# Patient Record
Sex: MALE | Race: WHITE | Employment: FULL TIME | URBAN - METROPOLITAN AREA
[De-identification: names, ages, dates, MRNs, and addresses within clinical notes are randomized per-mention and may not be internally consistent; named-entity substitution may affect disease eponyms.]

---

## 2017-01-10 PROBLEM — G43.719 INTRACTABLE CHRONIC MIGRAINE WITHOUT AURA AND WITHOUT STATUS MIGRAINOSUS: Status: ACTIVE | Noted: 2017-01-10

## 2017-01-10 PROBLEM — G44.40 REBOUND HEADACHE: Status: ACTIVE | Noted: 2017-01-10

## 2017-01-10 PROBLEM — G44.211 INTRACTABLE EPISODIC TENSION-TYPE HEADACHE: Status: ACTIVE | Noted: 2017-01-10

## 2017-01-20 RX ORDER — TRAMADOL HYDROCHLORIDE 50 MG/1
TABLET ORAL
Qty: 120 TABLET | Refills: 1 | Status: SHIPPED | OUTPATIENT
Start: 2017-01-20 | End: 2017-03-29 | Stop reason: SDUPTHER

## 2017-02-28 ENCOUNTER — OFFICE VISIT (OUTPATIENT)
Dept: INTERNAL MEDICINE CLINIC | Age: 57
End: 2017-02-28

## 2017-02-28 VITALS
SYSTOLIC BLOOD PRESSURE: 124 MMHG | DIASTOLIC BLOOD PRESSURE: 80 MMHG | BODY MASS INDEX: 28.6 KG/M2 | WEIGHT: 199.8 LBS | HEIGHT: 70 IN | HEART RATE: 80 BPM | RESPIRATION RATE: 16 BRPM

## 2017-02-28 DIAGNOSIS — I10 ESSENTIAL HYPERTENSION: Primary | ICD-10-CM

## 2017-02-28 DIAGNOSIS — G43.719 INTRACTABLE CHRONIC MIGRAINE WITHOUT AURA AND WITHOUT STATUS MIGRAINOSUS: ICD-10-CM

## 2017-02-28 PROBLEM — G44.40 REBOUND HEADACHE: Status: RESOLVED | Noted: 2017-01-10 | Resolved: 2017-02-28

## 2017-02-28 PROCEDURE — 99213 OFFICE O/P EST LOW 20 MIN: CPT | Performed by: INTERNAL MEDICINE

## 2017-03-29 RX ORDER — TRAMADOL HYDROCHLORIDE 50 MG/1
TABLET ORAL
Qty: 120 TABLET | Refills: 0 | Status: SHIPPED | OUTPATIENT
Start: 2017-03-29 | End: 2017-04-28 | Stop reason: SDUPTHER

## 2017-04-28 RX ORDER — TRAMADOL HYDROCHLORIDE 50 MG/1
TABLET ORAL
Qty: 120 TABLET | Refills: 0 | Status: SHIPPED | OUTPATIENT
Start: 2017-04-28 | End: 2017-06-01 | Stop reason: SDUPTHER

## 2017-05-16 ENCOUNTER — OFFICE VISIT (OUTPATIENT)
Dept: INTERNAL MEDICINE CLINIC | Age: 57
End: 2017-05-16

## 2017-05-16 VITALS
BODY MASS INDEX: 28.63 KG/M2 | DIASTOLIC BLOOD PRESSURE: 88 MMHG | HEART RATE: 88 BPM | RESPIRATION RATE: 16 BRPM | WEIGHT: 200 LBS | HEIGHT: 70 IN | SYSTOLIC BLOOD PRESSURE: 138 MMHG

## 2017-05-16 DIAGNOSIS — G43.109 MIGRAINE AURA WITHOUT HEADACHE: ICD-10-CM

## 2017-05-16 DIAGNOSIS — I10 ESSENTIAL HYPERTENSION: Primary | ICD-10-CM

## 2017-05-16 PROCEDURE — 99213 OFFICE O/P EST LOW 20 MIN: CPT | Performed by: INTERNAL MEDICINE

## 2017-05-30 ENCOUNTER — OFFICE VISIT (OUTPATIENT)
Dept: INTERNAL MEDICINE CLINIC | Age: 57
End: 2017-05-30

## 2017-05-30 VITALS
WEIGHT: 194.8 LBS | OXYGEN SATURATION: 98 % | TEMPERATURE: 98.3 F | DIASTOLIC BLOOD PRESSURE: 96 MMHG | SYSTOLIC BLOOD PRESSURE: 116 MMHG | BODY MASS INDEX: 27.89 KG/M2 | HEART RATE: 85 BPM | HEIGHT: 70 IN

## 2017-05-30 DIAGNOSIS — J40 BRONCHITIS: ICD-10-CM

## 2017-05-30 DIAGNOSIS — R03.0 ELEVATED BLOOD PRESSURE (NOT HYPERTENSION): ICD-10-CM

## 2017-05-30 PROCEDURE — 99213 OFFICE O/P EST LOW 20 MIN: CPT | Performed by: INTERNAL MEDICINE

## 2017-05-30 RX ORDER — GUAIFENESIN AND CODEINE PHOSPHATE 100; 10 MG/5ML; MG/5ML
5-10 SOLUTION ORAL EVERY 4 HOURS PRN
Qty: 180 ML | Refills: 1 | Status: SHIPPED | OUTPATIENT
Start: 2017-05-30 | End: 2017-07-11

## 2017-05-30 RX ORDER — AZITHROMYCIN 500 MG/1
500 TABLET, FILM COATED ORAL DAILY
Qty: 1 PACKET | Refills: 0 | Status: SHIPPED | OUTPATIENT
Start: 2017-05-30 | End: 2017-06-02

## 2017-06-01 RX ORDER — TRAMADOL HYDROCHLORIDE 50 MG/1
TABLET ORAL
Qty: 120 TABLET | Refills: 0 | Status: SHIPPED | OUTPATIENT
Start: 2017-06-01 | End: 2017-07-06 | Stop reason: SDUPTHER

## 2017-06-26 RX ORDER — SUMATRIPTAN 100 MG/1
TABLET, FILM COATED ORAL
Qty: 9 TABLET | Refills: 3 | Status: SHIPPED | OUTPATIENT
Start: 2017-06-26 | End: 2017-09-05 | Stop reason: SDUPTHER

## 2017-07-06 RX ORDER — TRAMADOL HYDROCHLORIDE 50 MG/1
TABLET ORAL
Qty: 120 TABLET | Refills: 0 | Status: SHIPPED | OUTPATIENT
Start: 2017-07-06 | End: 2017-07-25 | Stop reason: SDUPTHER

## 2017-07-21 ENCOUNTER — OFFICE VISIT (OUTPATIENT)
Dept: INTERNAL MEDICINE CLINIC | Age: 57
End: 2017-07-21

## 2017-07-21 VITALS
OXYGEN SATURATION: 98 % | BODY MASS INDEX: 28.32 KG/M2 | WEIGHT: 197.8 LBS | HEIGHT: 70 IN | DIASTOLIC BLOOD PRESSURE: 84 MMHG | SYSTOLIC BLOOD PRESSURE: 132 MMHG | HEART RATE: 84 BPM

## 2017-07-21 DIAGNOSIS — I10 ESSENTIAL HYPERTENSION: Primary | ICD-10-CM

## 2017-07-21 PROCEDURE — 99213 OFFICE O/P EST LOW 20 MIN: CPT | Performed by: INTERNAL MEDICINE

## 2017-07-25 RX ORDER — TRAMADOL HYDROCHLORIDE 50 MG/1
TABLET ORAL
Qty: 120 TABLET | Refills: 0 | Status: SHIPPED | OUTPATIENT
Start: 2017-07-25 | End: 2017-09-05 | Stop reason: SDUPTHER

## 2017-07-25 RX ORDER — LISINOPRIL 10 MG/1
TABLET ORAL
Qty: 30 TABLET | Refills: 11 | Status: SHIPPED | OUTPATIENT
Start: 2017-07-25 | End: 2018-05-03 | Stop reason: SDUPTHER

## 2017-07-25 RX ORDER — NADOLOL 80 MG/1
TABLET ORAL
Qty: 30 TABLET | Refills: 11 | Status: SHIPPED | OUTPATIENT
Start: 2017-07-25 | End: 2018-05-03 | Stop reason: SDUPTHER

## 2017-07-25 RX ORDER — TRIAMTERENE AND HYDROCHLOROTHIAZIDE 37.5; 25 MG/1; MG/1
TABLET ORAL
Qty: 30 TABLET | Refills: 11 | Status: SHIPPED | OUTPATIENT
Start: 2017-07-25 | End: 2018-05-03 | Stop reason: SDUPTHER

## 2017-09-05 RX ORDER — TRAMADOL HYDROCHLORIDE 50 MG/1
TABLET ORAL
Qty: 120 TABLET | Refills: 0 | Status: SHIPPED | OUTPATIENT
Start: 2017-09-05 | End: 2017-10-06 | Stop reason: SDUPTHER

## 2017-09-05 RX ORDER — SUMATRIPTAN 100 MG/1
TABLET, FILM COATED ORAL
Qty: 9 TABLET | Refills: 3 | Status: SHIPPED | OUTPATIENT
Start: 2017-09-05 | End: 2017-12-15 | Stop reason: SDUPTHER

## 2017-10-06 RX ORDER — TRAMADOL HYDROCHLORIDE 50 MG/1
TABLET ORAL
Qty: 120 TABLET | Refills: 1 | Status: SHIPPED | OUTPATIENT
Start: 2017-10-06 | End: 2017-11-06 | Stop reason: SDUPTHER

## 2017-11-06 RX ORDER — TRAMADOL HYDROCHLORIDE 50 MG/1
TABLET ORAL
Qty: 120 TABLET | Refills: 1 | Status: SHIPPED | OUTPATIENT
Start: 2017-11-06 | End: 2018-01-04 | Stop reason: SDUPTHER

## 2017-11-21 ENCOUNTER — OFFICE VISIT (OUTPATIENT)
Dept: INTERNAL MEDICINE CLINIC | Age: 57
End: 2017-11-21

## 2017-11-21 VITALS
HEART RATE: 74 BPM | BODY MASS INDEX: 28.92 KG/M2 | SYSTOLIC BLOOD PRESSURE: 130 MMHG | WEIGHT: 202 LBS | RESPIRATION RATE: 16 BRPM | HEIGHT: 70 IN | DIASTOLIC BLOOD PRESSURE: 82 MMHG

## 2017-11-21 DIAGNOSIS — Z23 NEED FOR INFLUENZA VACCINATION: ICD-10-CM

## 2017-11-21 DIAGNOSIS — E78.00 PURE HYPERCHOLESTEROLEMIA: ICD-10-CM

## 2017-11-21 DIAGNOSIS — G43.109 MIGRAINE AURA WITHOUT HEADACHE: ICD-10-CM

## 2017-11-21 DIAGNOSIS — I10 ESSENTIAL HYPERTENSION: Primary | ICD-10-CM

## 2017-11-21 DIAGNOSIS — Z12.5 SCREENING PSA (PROSTATE SPECIFIC ANTIGEN): ICD-10-CM

## 2017-11-21 PROCEDURE — 90471 IMMUNIZATION ADMIN: CPT | Performed by: INTERNAL MEDICINE

## 2017-11-21 PROCEDURE — 99213 OFFICE O/P EST LOW 20 MIN: CPT | Performed by: INTERNAL MEDICINE

## 2017-11-21 PROCEDURE — 90686 IIV4 VACC NO PRSV 0.5 ML IM: CPT | Performed by: INTERNAL MEDICINE

## 2017-11-21 ASSESSMENT — PATIENT HEALTH QUESTIONNAIRE - PHQ9
SUM OF ALL RESPONSES TO PHQ QUESTIONS 1-9: 0
SUM OF ALL RESPONSES TO PHQ9 QUESTIONS 1 & 2: 0
1. LITTLE INTEREST OR PLEASURE IN DOING THINGS: 0
2. FEELING DOWN, DEPRESSED OR HOPELESS: 0

## 2017-12-07 ENCOUNTER — TELEPHONE (OUTPATIENT)
Dept: INTERNAL MEDICINE CLINIC | Age: 57
End: 2017-12-07

## 2017-12-08 DIAGNOSIS — G43.109 MIGRAINE AURA WITHOUT HEADACHE: ICD-10-CM

## 2017-12-08 DIAGNOSIS — Z12.5 SCREENING PSA (PROSTATE SPECIFIC ANTIGEN): ICD-10-CM

## 2017-12-08 DIAGNOSIS — E78.00 PURE HYPERCHOLESTEROLEMIA: Primary | ICD-10-CM

## 2017-12-08 LAB
A/G RATIO: 1.6 (ref 1.1–2.2)
ALBUMIN SERPL-MCNC: 4.5 G/DL (ref 3.4–5)
ALP BLD-CCNC: 83 U/L (ref 40–129)
ALT SERPL-CCNC: 42 U/L (ref 10–40)
ANION GAP SERPL CALCULATED.3IONS-SCNC: 11 MMOL/L (ref 3–16)
AST SERPL-CCNC: 32 U/L (ref 15–37)
BILIRUB SERPL-MCNC: 0.5 MG/DL (ref 0–1)
BUN BLDV-MCNC: 15 MG/DL (ref 7–20)
CALCIUM SERPL-MCNC: 10.1 MG/DL (ref 8.3–10.6)
CHLORIDE BLD-SCNC: 99 MMOL/L (ref 99–110)
CHOLESTEROL, TOTAL: 209 MG/DL (ref 0–199)
CO2: 30 MMOL/L (ref 21–32)
CREAT SERPL-MCNC: 0.9 MG/DL (ref 0.9–1.3)
GFR AFRICAN AMERICAN: >60
GFR NON-AFRICAN AMERICAN: >60
GLOBULIN: 2.8 G/DL
GLUCOSE BLD-MCNC: 118 MG/DL (ref 70–99)
HDLC SERPL-MCNC: 47 MG/DL (ref 40–60)
LDL CHOLESTEROL CALCULATED: 130 MG/DL
POTASSIUM SERPL-SCNC: 5 MMOL/L (ref 3.5–5.1)
PROSTATE SPECIFIC ANTIGEN: 0.46 NG/ML (ref 0–4)
SODIUM BLD-SCNC: 140 MMOL/L (ref 136–145)
TOTAL PROTEIN: 7.3 G/DL (ref 6.4–8.2)
TRIGL SERPL-MCNC: 159 MG/DL (ref 0–150)
VLDLC SERPL CALC-MCNC: 32 MG/DL

## 2017-12-15 RX ORDER — SUMATRIPTAN 100 MG/1
TABLET, FILM COATED ORAL
Qty: 9 TABLET | Refills: 3 | Status: SHIPPED | OUTPATIENT
Start: 2017-12-15 | End: 2018-03-09 | Stop reason: SDUPTHER

## 2018-01-04 DIAGNOSIS — M54.2 CERVICALGIA: Primary | ICD-10-CM

## 2018-01-04 RX ORDER — TRAMADOL HYDROCHLORIDE 50 MG/1
TABLET ORAL
Qty: 120 TABLET | Refills: 1 | Status: SHIPPED | OUTPATIENT
Start: 2018-01-04 | End: 2018-03-09 | Stop reason: SDUPTHER

## 2018-03-09 ENCOUNTER — TELEPHONE (OUTPATIENT)
Dept: INTERNAL MEDICINE CLINIC | Age: 58
End: 2018-03-09

## 2018-03-09 DIAGNOSIS — M54.2 CERVICALGIA: ICD-10-CM

## 2018-03-09 RX ORDER — TRAMADOL HYDROCHLORIDE 50 MG/1
TABLET ORAL
Qty: 120 TABLET | Refills: 0 | Status: SHIPPED | OUTPATIENT
Start: 2018-03-09 | End: 2018-05-10 | Stop reason: SDUPTHER

## 2018-03-09 RX ORDER — SUMATRIPTAN 100 MG/1
TABLET, FILM COATED ORAL
Qty: 9 TABLET | Refills: 3 | Status: SHIPPED | OUTPATIENT
Start: 2018-03-09 | End: 2018-08-10

## 2018-03-22 ENCOUNTER — OFFICE VISIT (OUTPATIENT)
Dept: INTERNAL MEDICINE CLINIC | Age: 58
End: 2018-03-22

## 2018-03-22 VITALS
WEIGHT: 195 LBS | HEIGHT: 70 IN | SYSTOLIC BLOOD PRESSURE: 122 MMHG | RESPIRATION RATE: 16 BRPM | DIASTOLIC BLOOD PRESSURE: 96 MMHG | BODY MASS INDEX: 27.92 KG/M2 | HEART RATE: 82 BPM

## 2018-03-22 DIAGNOSIS — I10 ESSENTIAL HYPERTENSION: Primary | ICD-10-CM

## 2018-03-22 DIAGNOSIS — G43.719 INTRACTABLE CHRONIC MIGRAINE WITHOUT AURA AND WITHOUT STATUS MIGRAINOSUS: ICD-10-CM

## 2018-03-22 DIAGNOSIS — E78.00 PURE HYPERCHOLESTEROLEMIA: ICD-10-CM

## 2018-03-22 DIAGNOSIS — R73.9 HYPERGLYCEMIA: ICD-10-CM

## 2018-03-22 PROCEDURE — 99213 OFFICE O/P EST LOW 20 MIN: CPT | Performed by: INTERNAL MEDICINE

## 2018-04-26 DIAGNOSIS — R73.9 HYPERGLYCEMIA: ICD-10-CM

## 2018-04-26 DIAGNOSIS — E78.00 PURE HYPERCHOLESTEROLEMIA: ICD-10-CM

## 2018-04-26 LAB
CHOLESTEROL, TOTAL: 153 MG/DL (ref 0–199)
GLUCOSE BLD-MCNC: 102 MG/DL (ref 70–99)
HDLC SERPL-MCNC: 40 MG/DL (ref 40–60)
LDL CHOLESTEROL CALCULATED: 83 MG/DL
TRIGL SERPL-MCNC: 150 MG/DL (ref 0–150)
VLDLC SERPL CALC-MCNC: 30 MG/DL

## 2018-05-03 ENCOUNTER — OFFICE VISIT (OUTPATIENT)
Dept: INTERNAL MEDICINE CLINIC | Age: 58
End: 2018-05-03

## 2018-05-03 VITALS
BODY MASS INDEX: 27.69 KG/M2 | WEIGHT: 193.4 LBS | SYSTOLIC BLOOD PRESSURE: 118 MMHG | RESPIRATION RATE: 16 BRPM | HEIGHT: 70 IN | DIASTOLIC BLOOD PRESSURE: 80 MMHG | HEART RATE: 78 BPM

## 2018-05-03 DIAGNOSIS — I10 ESSENTIAL HYPERTENSION: Primary | ICD-10-CM

## 2018-05-03 PROCEDURE — 99213 OFFICE O/P EST LOW 20 MIN: CPT | Performed by: INTERNAL MEDICINE

## 2018-05-03 RX ORDER — TRIAMTERENE AND HYDROCHLOROTHIAZIDE 37.5; 25 MG/1; MG/1
TABLET ORAL
Qty: 90 TABLET | Refills: 3 | Status: SHIPPED | OUTPATIENT
Start: 2018-05-03 | End: 2019-01-31 | Stop reason: SDUPTHER

## 2018-05-03 RX ORDER — LISINOPRIL 10 MG/1
TABLET ORAL
Qty: 90 TABLET | Refills: 3 | Status: SHIPPED | OUTPATIENT
Start: 2018-05-03 | End: 2019-01-31 | Stop reason: SDUPTHER

## 2018-05-03 RX ORDER — NADOLOL 80 MG/1
TABLET ORAL
Qty: 90 TABLET | Refills: 3 | Status: SHIPPED | OUTPATIENT
Start: 2018-05-03 | End: 2019-01-31 | Stop reason: SDUPTHER

## 2018-05-10 DIAGNOSIS — M54.2 CERVICALGIA: ICD-10-CM

## 2018-05-10 RX ORDER — TRAMADOL HYDROCHLORIDE 50 MG/1
TABLET ORAL
Qty: 120 TABLET | Refills: 0 | Status: SHIPPED | OUTPATIENT
Start: 2018-05-10 | End: 2018-06-11 | Stop reason: SDUPTHER

## 2018-06-11 DIAGNOSIS — M54.2 CERVICALGIA: ICD-10-CM

## 2018-06-11 RX ORDER — TRAMADOL HYDROCHLORIDE 50 MG/1
TABLET ORAL
Qty: 120 TABLET | Refills: 0 | Status: SHIPPED | OUTPATIENT
Start: 2018-06-11 | End: 2018-07-11 | Stop reason: SDUPTHER

## 2018-07-11 DIAGNOSIS — M54.2 CERVICALGIA: ICD-10-CM

## 2018-07-11 RX ORDER — TRAMADOL HYDROCHLORIDE 50 MG/1
TABLET ORAL
Qty: 120 TABLET | Refills: 0 | Status: SHIPPED | OUTPATIENT
Start: 2018-07-11 | End: 2018-08-09 | Stop reason: SDUPTHER

## 2018-08-03 ENCOUNTER — TELEPHONE (OUTPATIENT)
Dept: INTERNAL MEDICINE CLINIC | Age: 58
End: 2018-08-03

## 2018-08-03 NOTE — TELEPHONE ENCOUNTER
Patient called to cancel appointment with Dr. Ricky Davenport today   He is headed to emergency room with terrible pain on right side

## 2018-08-09 DIAGNOSIS — M54.2 CERVICALGIA: ICD-10-CM

## 2018-08-09 RX ORDER — TRAMADOL HYDROCHLORIDE 50 MG/1
TABLET ORAL
Qty: 120 TABLET | Refills: 0 | Status: SHIPPED | OUTPATIENT
Start: 2018-08-09 | End: 2018-08-10

## 2018-08-10 ENCOUNTER — OFFICE VISIT (OUTPATIENT)
Dept: INTERNAL MEDICINE CLINIC | Age: 58
End: 2018-08-10

## 2018-08-10 VITALS
BODY MASS INDEX: 28.09 KG/M2 | WEIGHT: 196.2 LBS | RESPIRATION RATE: 16 BRPM | DIASTOLIC BLOOD PRESSURE: 88 MMHG | SYSTOLIC BLOOD PRESSURE: 120 MMHG | HEART RATE: 86 BPM | HEIGHT: 70 IN

## 2018-08-10 DIAGNOSIS — M54.2 CERVICALGIA: ICD-10-CM

## 2018-08-10 DIAGNOSIS — I10 ESSENTIAL HYPERTENSION: Primary | ICD-10-CM

## 2018-08-10 LAB
ANION GAP SERPL CALCULATED.3IONS-SCNC: 15 MMOL/L (ref 3–16)
BUN BLDV-MCNC: 22 MG/DL (ref 7–20)
CALCIUM SERPL-MCNC: 9.4 MG/DL (ref 8.3–10.6)
CHLORIDE BLD-SCNC: 99 MMOL/L (ref 99–110)
CO2: 26 MMOL/L (ref 21–32)
CREAT SERPL-MCNC: 1.1 MG/DL (ref 0.9–1.3)
GFR AFRICAN AMERICAN: >60
GFR NON-AFRICAN AMERICAN: >60
GLUCOSE BLD-MCNC: 105 MG/DL (ref 70–99)
POTASSIUM SERPL-SCNC: 4.1 MMOL/L (ref 3.5–5.1)
SODIUM BLD-SCNC: 140 MMOL/L (ref 136–145)

## 2018-08-10 PROCEDURE — 99213 OFFICE O/P EST LOW 20 MIN: CPT | Performed by: INTERNAL MEDICINE

## 2018-08-10 NOTE — PROGRESS NOTES
Subjective:      Patient ID: Makenna Duong is a 66 y.o. male. HPI  Here for f/u of his htn and back pain. He has been doing well overall. Had a kidney stone over the weekend. This has passed. BP at home 120's/70's. Denies chest pain or shortness of breath. Denies PND or orthopnea. No lower extremity edema noted. No formal exercise but does walks regularly at work. The chronic headaches and pain are stable, unchanged. Current Outpatient Prescriptions on File Prior to Visit   Medication Sig Dispense Refill    traMADol (ULTRAM) 50 MG tablet TAKE 1 TABLET EVERY 6 HOURS AS NEEDED FOR PAIN 120 tablet 0    tamsulosin (FLOMAX) 0.4 MG capsule Take 1 capsule by mouth daily for 5 doses 5 capsule 0    naproxen (NAPROSYN) 500 MG tablet Take 1 tablet by mouth 2 times daily (with meals) 20 tablet 0    ondansetron (ZOFRAN ODT) 4 MG disintegrating tablet Take 1-2 tablets by mouth every 8 hours as needed for Nausea May Sub regular tablet (non-ODT) if insurance does not cover ODT. 20 tablet 0    lisinopril (PRINIVIL;ZESTRIL) 10 MG tablet Take 1 tablet by mouth daily. 90 tablet 3    triamterene-hydrochlorothiazide (MAXZIDE-25) 37.5-25 MG per tablet Take 1 tablet by mouth daily. 90 tablet 3    nadolol (CORGARD) 80 MG tablet TAKE ONE TABLET BY MOUTH EVERY DAY 90 tablet 3    SUMAtriptan (IMITREX) 100 MG tablet TAKE 1 TAB PO PRN FOR MIGRAINE, MAY REPEAT ONCE AFTER 2 HRS. NOT TO EXCEED 2 DOSES IN 24 HRS. 9 tablet 3    nortriptyline (PAMELOR) 10 MG capsule Take 3 capsules by mouth nightly 90 capsule 11     No current facility-administered medications on file prior to visit. Review of Systems   All other systems reviewed and are negative. Objective:   Physical Exam   Constitutional: He appears well-developed and well-nourished. Cardiovascular: Normal rate, regular rhythm and normal heart sounds. Pulmonary/Chest: Effort normal and breath sounds normal. He has no wheezes. He has no rales.

## 2018-09-10 DIAGNOSIS — M54.2 CERVICALGIA: ICD-10-CM

## 2018-09-10 RX ORDER — TRAMADOL HYDROCHLORIDE 50 MG/1
TABLET ORAL
Qty: 120 TABLET | Refills: 0 | Status: SHIPPED | OUTPATIENT
Start: 2018-09-10 | End: 2018-10-09 | Stop reason: SDUPTHER

## 2018-10-09 DIAGNOSIS — M54.2 CERVICALGIA: ICD-10-CM

## 2018-10-09 RX ORDER — TRAMADOL HYDROCHLORIDE 50 MG/1
TABLET ORAL
Qty: 120 TABLET | Refills: 0 | Status: SHIPPED | OUTPATIENT
Start: 2018-10-09 | End: 2018-11-07 | Stop reason: SDUPTHER

## 2018-11-07 DIAGNOSIS — M54.2 CERVICALGIA: ICD-10-CM

## 2018-11-07 RX ORDER — TRAMADOL HYDROCHLORIDE 50 MG/1
TABLET ORAL
Qty: 120 TABLET | Refills: 0 | Status: SHIPPED | OUTPATIENT
Start: 2018-11-07 | End: 2018-12-06

## 2018-12-05 DIAGNOSIS — M54.2 CERVICALGIA: ICD-10-CM

## 2018-12-06 DIAGNOSIS — M54.2 CERVICALGIA: ICD-10-CM

## 2018-12-06 RX ORDER — TRAMADOL HYDROCHLORIDE 50 MG/1
TABLET ORAL
Qty: 120 TABLET | Refills: 0 | Status: SHIPPED | OUTPATIENT
Start: 2018-12-06 | End: 2019-01-05

## 2018-12-06 RX ORDER — TRAMADOL HYDROCHLORIDE 50 MG/1
TABLET ORAL
Qty: 120 TABLET | Refills: 0 | Status: SHIPPED | OUTPATIENT
Start: 2018-12-06 | End: 2019-01-31 | Stop reason: SDUPTHER

## 2019-01-31 DIAGNOSIS — M54.2 CERVICALGIA: ICD-10-CM

## 2019-02-01 RX ORDER — TRAMADOL HYDROCHLORIDE 50 MG/1
TABLET ORAL
Qty: 120 TABLET | Refills: 0 | Status: SHIPPED | OUTPATIENT
Start: 2019-02-01 | End: 2019-02-28 | Stop reason: SDUPTHER

## 2019-02-01 RX ORDER — LISINOPRIL 10 MG/1
TABLET ORAL
Qty: 90 TABLET | Refills: 3 | Status: SHIPPED | OUTPATIENT
Start: 2019-02-01 | End: 2020-01-20

## 2019-02-01 RX ORDER — TRIAMTERENE AND HYDROCHLOROTHIAZIDE 37.5; 25 MG/1; MG/1
TABLET ORAL
Qty: 90 TABLET | Refills: 3 | Status: SHIPPED | OUTPATIENT
Start: 2019-02-01 | End: 2020-01-20

## 2019-02-01 RX ORDER — NADOLOL 80 MG/1
TABLET ORAL
Qty: 90 TABLET | Refills: 3 | Status: SHIPPED | OUTPATIENT
Start: 2019-02-01 | End: 2020-01-20

## 2019-02-28 DIAGNOSIS — M54.2 CERVICALGIA: ICD-10-CM

## 2019-02-28 RX ORDER — TRAMADOL HYDROCHLORIDE 50 MG/1
TABLET ORAL
Qty: 120 TABLET | Refills: 0 | Status: SHIPPED | OUTPATIENT
Start: 2019-02-28 | End: 2019-03-28 | Stop reason: SDUPTHER

## 2019-03-28 DIAGNOSIS — M54.2 CERVICALGIA: ICD-10-CM

## 2019-03-28 RX ORDER — TRAMADOL HYDROCHLORIDE 50 MG/1
TABLET ORAL
Qty: 120 TABLET | Refills: 0 | Status: SHIPPED | OUTPATIENT
Start: 2019-03-28 | End: 2019-04-24 | Stop reason: SDUPTHER

## 2019-04-24 DIAGNOSIS — M54.2 CERVICALGIA: ICD-10-CM

## 2019-04-24 RX ORDER — TRAMADOL HYDROCHLORIDE 50 MG/1
TABLET ORAL
Qty: 120 TABLET | Refills: 0 | Status: SHIPPED | OUTPATIENT
Start: 2019-04-24 | End: 2019-05-22 | Stop reason: SDUPTHER

## 2019-05-21 ENCOUNTER — HOSPITAL ENCOUNTER (EMERGENCY)
Age: 59
Discharge: HOME | End: 2019-05-21
Payer: COMMERCIAL

## 2019-05-21 VITALS
TEMPERATURE: 98.6 F | WEIGHT: 200.18 LBS | SYSTOLIC BLOOD PRESSURE: 136 MMHG | HEIGHT: 70 IN | OXYGEN SATURATION: 97 % | DIASTOLIC BLOOD PRESSURE: 91 MMHG | RESPIRATION RATE: 16 BRPM | BODY MASS INDEX: 28.66 KG/M2 | HEART RATE: 71 BPM

## 2019-05-21 DIAGNOSIS — M71.22 BAKER CYST, LEFT: ICD-10-CM

## 2019-05-21 DIAGNOSIS — M79.662 PAIN OF LEFT CALF: Primary | ICD-10-CM

## 2019-05-21 PROCEDURE — 93971 EXTREMITY STUDY: CPT

## 2019-05-21 PROCEDURE — 99283 EMERGENCY DEPT VISIT LOW MDM: CPT

## 2019-05-21 RX ORDER — IBUPROFEN 800 MG/1
800 TABLET ORAL EVERY 8 HOURS PRN
Qty: 20 TABLET | Refills: 0 | Status: SHIPPED | OUTPATIENT
Start: 2019-05-21 | End: 2019-07-12

## 2019-05-21 ASSESSMENT — PAIN SCALES - GENERAL
PAINLEVEL_OUTOF10: 3
PAINLEVEL_OUTOF10: 3

## 2019-05-21 ASSESSMENT — PAIN DESCRIPTION - PAIN TYPE: TYPE: ACUTE PAIN

## 2019-05-21 ASSESSMENT — PAIN DESCRIPTION - DESCRIPTORS: DESCRIPTORS: ACHING

## 2019-05-21 ASSESSMENT — PAIN DESCRIPTION - ORIENTATION: ORIENTATION: LEFT

## 2019-05-21 ASSESSMENT — PAIN DESCRIPTION - LOCATION: LOCATION: LEG

## 2019-05-21 NOTE — ED PROVIDER NOTES
**EVALUATED BY ADVANCED PRACTICE PROVIDER**        [address]      Pt Name: Ana Page  TEQ:[id number]  [place] 1960  Date of evaluation: 5/21/2019  Provider: JONN Casillas      Chief Complaint:    Chief Complaint   Patient presents with    Leg Pain     lower leg pain since this morning. radiates up into his left back. no redness or warmth to the site. denies hx of dvt       Nursing Notes, Past Medical Hx, Past Surgical Hx, Social Hx, Allergies, and Family Hx were all reviewed and agreed with or any disagreements were addressed in the HPI.    HPI:  (Location, Duration, Timing, Severity,Quality, Assoc Sx, Context, Modifying factors)  This is a  66 y.o. male who presents here to the emergency department, the patient states that he's been having some left leg pain that radiates up his back, starts in his calf and radiates out to the lateral and medial side. He states that he was coming down some stairs when he noticed the pain today, pain level 3/10. He states that he recently got back from a driving trip to [place], and he did have pain similarly when he was in [place]. He denies chest pain or shortness of breath or difficulty breathing. He denies being on any blood thinners or any known blood clotting disorders. PastMedical/Surgical History:      Diagnosis Date    Chronic headache     Hyperlipidemia     Hypertension     Migraines          Procedure Laterality Date    CARPAL TUNNEL RELEASE Right 1998    COLONOSCOPY  11/30/2016    O'Mati- normal, repeat 5 years       Medications:  Previous Medications    LISINOPRIL (PRINIVIL;ZESTRIL) 10 MG TABLET    TAKE 1 TABLET BY MOUTH DAILY.     NADOLOL (CORGARD) 80 MG TABLET    TAKE ONE TABLET BY MOUTH EVERY DAY    TRAMADOL (ULTRAM) 50 MG TABLET    TAKE 1 TABLET EVERY 6 HOURS AS NEEDED FOR PAIN    TRIAMTERENE-HYDROCHLOROTHIAZIDE (MAXZIDE-25) 37.5-25 MG PER TABLET    TAKE 1 was given:  Medications - No data to display    The differential diagnosis of this patient includes DVT, Baker's cyst, Strain, other musculoskeletal etiologies. Also in the differential diagnosis, is lumbar sacral radiculopathy. This patient has minimal to no back pain, he has no evidence of DVT, the concern was for travel and pain associated after the travel in the calf along with some minimal swelling. The patient tolerated their visit well. I evaluated the patient. The physician was available for consultation as needed. The patient and / or the family were informed of the results of anytests, a time was given to answer questions, a plan was proposed and they agreed with plan. CLINICAL IMPRESSION:  1. Pain of left calf    2.  Baker cyst, left        DISPOSITION Decision To Discharge 05/21/2019 01:41:26 PM      PATIENT REFERRED TO:  Brittani Carcamo MD  [address] [place] [address], [id number]  [phone]    Call today  For a recheck in 1-7 days      DISCHARGE MEDICATIONS:  New Prescriptions    IBUPROFEN (IBU) 800 MG TABLET    Take 1 tablet by mouth every 8 hours as needed for Pain       DISCONTINUED MEDICATIONS:  Discontinued Medications    No medications on file              (Please note the MDM and HPI sections of this note were completed with a voice recognition program.  Efforts weremade to edit the dictations but occasionally words are mis-transcribed.)    Electronically signed, Tariq Parrish,        Tariq Parrish  05/21/19 8681

## 2019-05-21 NOTE — ED TRIAGE NOTES
lower leg pain since this morning. radiates up into his left back. no redness or warmth to the site.  denies hx of dvt

## 2019-05-21 NOTE — ED NOTES
Dc instructions completed with pt. Pt verbalized understanding. Ortho for follow up. Pt was given rx x1. He was ambulatory to exit.       Octaviano Montilla RN  05/21/19 8143

## 2019-05-22 DIAGNOSIS — M54.2 CERVICALGIA: ICD-10-CM

## 2019-05-22 RX ORDER — TRAMADOL HYDROCHLORIDE 50 MG/1
TABLET ORAL
Qty: 120 TABLET | Refills: 0 | Status: SHIPPED | OUTPATIENT
Start: 2019-05-22 | End: 2019-06-21 | Stop reason: SDUPTHER

## 2019-05-30 ENCOUNTER — OFFICE VISIT (OUTPATIENT)
Dept: ORTHOPEDIC SURGERY | Age: 59
End: 2019-05-30
Payer: COMMERCIAL

## 2019-05-30 VITALS — WEIGHT: 200 LBS | HEIGHT: 69 IN | RESPIRATION RATE: 16 BRPM | BODY MASS INDEX: 29.62 KG/M2

## 2019-05-30 DIAGNOSIS — S86.819A STRAIN OF CALF MUSCLE, INITIAL ENCOUNTER: ICD-10-CM

## 2019-05-30 DIAGNOSIS — M71.22 BAKER'S CYST OF KNEE, LEFT: Primary | ICD-10-CM

## 2019-05-30 PROCEDURE — 99243 OFF/OP CNSLTJ NEW/EST LOW 30: CPT | Performed by: ORTHOPAEDIC SURGERY

## 2019-05-30 NOTE — PROGRESS NOTES
ORTHOPAEDIC CONSULTATION NOTE    Chief Complaint   Patient presents with    Leg Pain     left       HPI  66 y.o. male seen in consultation at the request of Nidia Baer MD for evaluation of left leg pain:    Onset 5/21/2019  History of symptoms once before  Injury/trauma none  Got up from seated position and noticed pain  Pain went away by itself  He went ot ED as he feared a blood clot  Denies VTE history  He denies pain today, no issues  Pain was previously located distal lateral leg  Worse with N/A  Better with time      I have reviewed and discussed the below pain assessment findings with the patient. Pain Assessment  Location of Pain: Leg  Location Modifiers: Left  Severity of Pain: 0  Quality of Pain: (shooting pain when it hits )  Duration of Pain: Other (Comment)(a few times over the past months)  Frequency of Pain: Other (Comment)  Date Pain First Started: (months )  Aggravating Factors: Squatting  Limiting Behavior: No  Relieving Factors: Rest  Result of Injury: No  Work-Related Injury: No  Are there other pain locations you wish to document?: No    Review of Systems  I have read over the ROS from the Patient History Form dated on 5/30/2019  Pertinent positives include HEADACHES AND BACK PAIN  Rest of 13 point ROS otherwise negative except per HPI, and scanned into the patient's chart under the Media tab. Allergies   Allergen Reactions    Simvastatin      Abnormal lft's      Tetracyclines & Related Dermatitis     Turned red with topical TCN        Current Outpatient Medications   Medication Sig Dispense Refill    traMADol (ULTRAM) 50 MG tablet TAKE 1 TABLET EVERY 6 HOURS AS NEEDED FOR PAIN 120 tablet 0    ibuprofen (IBU) 800 MG tablet Take 1 tablet by mouth every 8 hours as needed for Pain 20 tablet 0    lisinopril (PRINIVIL;ZESTRIL) 10 MG tablet TAKE 1 TABLET BY MOUTH DAILY.  90 tablet 3    nadolol (CORGARD) 80 MG tablet TAKE ONE TABLET BY MOUTH EVERY DAY 90 tablet 3    triamterene-hydrochlorothiazide (MAXZIDE-25) 37.5-25 MG per tablet TAKE 1 TABLET BY MOUTH DAILY. 90 tablet 3     No current facility-administered medications for this visit.         Past Medical History:   Diagnosis Date    Chronic headache     Hyperlipidemia     Hypertension     Migraines         Past Surgical History:   Procedure Laterality Date    CARPAL TUNNEL RELEASE Right 1998    COLONOSCOPY  11/30/2016    O'Mati- normal, repeat 5 years       Family History   Problem Relation Age of Onset    Cancer Father         Colon cancer    Stroke Father     Heart Disease Father 46        + cigar smoker       Social History     Socioeconomic History    Marital status:      Spouse name: Not on file    Number of children: Not on file    Years of education: Not on file    Highest education level: Not on file   Occupational History    Not on file   Social Needs    Financial resource strain: Not on file    Food insecurity:     Worry: Not on file     Inability: Not on file    Transportation needs:     Medical: Not on file     Non-medical: Not on file   Tobacco Use    Smoking status: Never Smoker    Smokeless tobacco: Never Used   Substance and Sexual Activity    Alcohol use: Yes     Comment: rare    Drug use: No    Sexual activity: Not on file   Lifestyle    Physical activity:     Days per week: Not on file     Minutes per session: Not on file    Stress: Not on file   Relationships    Social connections:     Talks on phone: Not on file     Gets together: Not on file     Attends Mu-ism service: Not on file     Active member of club or organization: Not on file     Attends meetings of clubs or organizations: Not on file     Relationship status: Not on file    Intimate partner violence:     Fear of current or ex partner: Not on file     Emotionally abused: Not on file     Physically abused: Not on file     Forced sexual activity: Not on file   Other Topics Concern    Not on file   Social History Narrative    Not on file        Vitals:    05/30/19 1323   Resp: 16   Weight: 200 lb (90.7 kg)   Height: 5' 9\" (1.753 m)       Physical Exam  Constitutional - well-groomed, well-nourished, Body mass index is 29.53 kg/m². Psychiatric - pleasant, normal mood & affect, oriented to place, person, and situation  Cardiovascular - RRR, equivocal peripheral edema, no varicosities, posterior tibialis pulse 2+  Respiratory - respirations even and unlabored  Skin - no rashes, wounds, or lesions seen  Neck/Spine - neg SLR  Neurological - SILT SP/DP/T/sural/saphenous nerve distributions; EHL/TA/GS intact  Left knee:   mild varus alignment    No effusion noted   No atrophy     No tenderness to palpation medial joint line   No tenderness to palpation lateral joint line   Range of Motion:    Extension:  0    Flexion:  140   Special tests:     negative Caterina exam     negative crepitus with ROM    negative patellar grind   Ligamentous testing:    Varus stress stable    Valgus stress stable    Lachman stable    Posterior Drawer stable  Left leg - no deformity   No TTP   Negative holly test   Ankle dorsiflexion with knee extended ~5 deg   Ankle dorsiflexion with knee flexed ~20 deg      Imaging:  Images were personally reviewed by myself and discussed with the patient  Left knee 4 views performed today in clinic   - Overall alignment is mild varus. The medial compartment is mildly narrowed with small osteophytes seen. The lateral compartment is  within normal limits with no evidence of subchondral cystic changes or osteophytes. The anterior compartment is mildly narrowed with small osteophytes seen. The patella is well-centered within the trochlear groove. There are no loose bodies appreciated.     Narrative   Lower Extremities DVT Study        Demographics        Patient Name       Elkin Parry        Date of Study      05/21/2019         Gender              Male        Patient Number     [id number]         Date of Birth       1960        Visit Number       [id number]          Age                 58 year(s)        Accession Number   [id number]          Room Number         028        Corporate ID       [id number]            Sonographer         Heath Beatty                                                              TATO        Ordering Physician Manuela Dawson,   Interpreting        Morro Mehta PA                 Physician           MD       Procedure       Type of Study:        Veins:Lower Extremities DVT Study, VL EXTREMITY VENOUS DUPLEX LEFT.      Vascular Sonographer Report       Additional Indications:Pain and swelling       Impressions       Left Impression   No evidence of deep vein or superficial vein thrombosis involving the left   lower extremity and the right common femoral vein. Evidence of a cystic structure seen in the medial popliteal space of the left   leg.       Conclusions        Summary        No evidence of deep vein or superficial vein thrombosis involving the left    lower extremity and the right common femoral vein.    Evidence of a cystic structure seen in the medial popliteal space of the    left leg.        Signature        ------------------------------------------------------------------    Electronically signed by Tia Cardona MD (Interpreting    physician) on 05/21/2019 at 02:34 PM    ------------------------------------------------------------------       Patient Status:ER. [place] [id number] - Vascular Lab. Technical Quality:Adequate visualization.         - Results were reported to:Spoke to Kary Anderson.       Velocities are measured in cm/s ; Diameters are measured in mm       Right Lower Extremities DVT Study Measurements   Right 2D Measurements   +--------------+----------+---------------+----------+   ! Location      ! Visualized! Compressibility! Thrombosis!    +--------------+----------+---------------+----------+ !Common Femoral!Yes       ! Yes            !None      !   +--------------+----------+---------------+----------+       Right Doppler Measurements   +--------------+------+------+------------+   ! Location      !Signal!Reflux! Reflux (sec)! +--------------+------+------+------------+   ! Common Femoral!Phasic! No    !            !   +--------------+------+------+------------+       Left Lower Extremities DVT Study Measurements   Left 2D Measurements   +------------------------+----------+---------------+----------+   ! Location                ! Visualized! Compressibility! Thrombosis! +------------------------+----------+---------------+----------+   ! Sapheno Femoral Junction! Yes       ! Yes            !None      !   +------------------------+----------+---------------+----------+   ! GSV Thigh               ! Yes       ! Yes            !None      !   +------------------------+----------+---------------+----------+   ! Common Femoral          ! Yes       ! Yes            !None      !   +------------------------+----------+---------------+----------+   ! Prox Femoral            ! Yes       ! Yes            !None      !   +------------------------+----------+---------------+----------+   ! Mid Femoral             ! Yes       ! Yes            !None      !   +------------------------+----------+---------------+----------+   ! Dist Femoral            ! Yes       ! Yes            !None      !   +------------------------+----------+---------------+----------+   ! Deep Femoral            ! Yes       ! Yes            !None      !   +------------------------+----------+---------------+----------+   ! Popliteal               ! Yes       ! Yes            !None      !   +------------------------+----------+---------------+----------+   ! GSV Below Knee          ! Yes       ! Yes            !None      !   +------------------------+----------+---------------+----------+   ! Gastroc                 ! Yes       ! Yes            !None      ! weight and stretch for 10 sec each time, doing it three times daily. Very gradually over a period of a few months, you will want to increase to 3 minutes each time, therefore stretching 9 minutes a day. It is important to not advance too quickly as this can result in stress injuries.

## 2019-06-21 DIAGNOSIS — M54.2 CERVICALGIA: ICD-10-CM

## 2019-06-21 RX ORDER — TRAMADOL HYDROCHLORIDE 50 MG/1
TABLET ORAL
Qty: 120 TABLET | Refills: 0 | Status: SHIPPED | OUTPATIENT
Start: 2019-06-21 | End: 2019-07-19 | Stop reason: SDUPTHER

## 2019-11-25 ENCOUNTER — OFFICE VISIT (OUTPATIENT)
Dept: ORTHOPEDIC SURGERY | Age: 59
End: 2019-11-25
Payer: COMMERCIAL

## 2019-11-25 VITALS — HEIGHT: 69 IN | WEIGHT: 201 LBS | BODY MASS INDEX: 29.77 KG/M2 | RESPIRATION RATE: 12 BRPM

## 2019-11-25 DIAGNOSIS — M25.512 ACUTE PAIN OF LEFT SHOULDER: Primary | ICD-10-CM

## 2019-11-25 DIAGNOSIS — M75.42 IMPINGEMENT SYNDROME OF LEFT SHOULDER: ICD-10-CM

## 2019-11-25 PROCEDURE — 99203 OFFICE O/P NEW LOW 30 MIN: CPT | Performed by: ORTHOPAEDIC SURGERY

## 2019-11-25 PROCEDURE — 20610 DRAIN/INJ JOINT/BURSA W/O US: CPT | Performed by: ORTHOPAEDIC SURGERY

## 2019-11-25 RX ORDER — METHYLPREDNISOLONE ACETATE 40 MG/ML
80 INJECTION, SUSPENSION INTRA-ARTICULAR; INTRALESIONAL; INTRAMUSCULAR; SOFT TISSUE ONCE
Status: COMPLETED | OUTPATIENT
Start: 2019-11-25 | End: 2019-11-25

## 2019-11-25 RX ORDER — LIDOCAINE HYDROCHLORIDE 10 MG/ML
4 INJECTION, SOLUTION INFILTRATION; PERINEURAL ONCE
Status: COMPLETED | OUTPATIENT
Start: 2019-11-25 | End: 2019-11-25

## 2019-11-25 RX ADMIN — METHYLPREDNISOLONE ACETATE 80 MG: 40 INJECTION, SUSPENSION INTRA-ARTICULAR; INTRALESIONAL; INTRAMUSCULAR; SOFT TISSUE at 13:59

## 2019-11-25 RX ADMIN — LIDOCAINE HYDROCHLORIDE 4 ML: 10 INJECTION, SOLUTION INFILTRATION; PERINEURAL at 13:58

## 2019-11-25 ASSESSMENT — ENCOUNTER SYMPTOMS
EYES NEGATIVE: 1
RESPIRATORY NEGATIVE: 1
GASTROINTESTINAL NEGATIVE: 1
ALLERGIC/IMMUNOLOGIC NEGATIVE: 1
BACK PAIN: 1

## 2019-12-09 ENCOUNTER — HOSPITAL ENCOUNTER (OUTPATIENT)
Dept: PHYSICAL THERAPY | Age: 59
Setting detail: THERAPIES SERIES
Discharge: HOME | End: 2019-12-09
Payer: COMMERCIAL

## 2019-12-09 PROCEDURE — 97161 PT EVAL LOW COMPLEX 20 MIN: CPT

## 2019-12-09 PROCEDURE — 97110 THERAPEUTIC EXERCISES: CPT

## 2019-12-18 ENCOUNTER — APPOINTMENT (OUTPATIENT)
Dept: PHYSICAL THERAPY | Age: 59
End: 2019-12-18
Payer: COMMERCIAL

## 2020-01-20 RX ORDER — TRIAMTERENE AND HYDROCHLOROTHIAZIDE 37.5; 25 MG/1; MG/1
TABLET ORAL
Qty: 90 TABLET | Refills: 3 | Status: SHIPPED | OUTPATIENT
Start: 2020-01-20 | End: 2021-01-26

## 2020-01-20 RX ORDER — LISINOPRIL 10 MG/1
TABLET ORAL
Qty: 90 TABLET | Refills: 3 | Status: SHIPPED | OUTPATIENT
Start: 2020-01-20 | End: 2021-01-26

## 2020-01-20 RX ORDER — TRAMADOL HYDROCHLORIDE 50 MG/1
50 TABLET ORAL EVERY 6 HOURS PRN
Qty: 120 TABLET | Refills: 0 | Status: SHIPPED | OUTPATIENT
Start: 2020-01-20 | End: 2020-02-25

## 2020-01-20 RX ORDER — NADOLOL 80 MG/1
TABLET ORAL
Qty: 90 TABLET | Refills: 3 | Status: SHIPPED | OUTPATIENT
Start: 2020-01-20 | End: 2021-01-26

## 2020-02-25 RX ORDER — TRAMADOL HYDROCHLORIDE 50 MG/1
50 TABLET ORAL EVERY 6 HOURS PRN
Qty: 120 TABLET | Refills: 0 | Status: SHIPPED | OUTPATIENT
Start: 2020-02-25 | End: 2020-03-30

## 2020-04-30 RX ORDER — TRAMADOL HYDROCHLORIDE 50 MG/1
50 TABLET ORAL EVERY 6 HOURS PRN
Qty: 120 TABLET | Refills: 0 | Status: SHIPPED | OUTPATIENT
Start: 2020-04-30 | End: 2020-05-28

## 2020-05-28 RX ORDER — TRAMADOL HYDROCHLORIDE 50 MG/1
50 TABLET ORAL EVERY 6 HOURS PRN
Qty: 120 TABLET | Refills: 0 | Status: SHIPPED | OUTPATIENT
Start: 2020-05-28 | End: 2020-06-29

## 2020-06-29 RX ORDER — TRAMADOL HYDROCHLORIDE 50 MG/1
50 TABLET ORAL EVERY 6 HOURS PRN
Qty: 120 TABLET | Refills: 0 | Status: SHIPPED | OUTPATIENT
Start: 2020-06-29 | End: 2020-07-28

## 2020-07-28 RX ORDER — TRAMADOL HYDROCHLORIDE 50 MG/1
50 TABLET ORAL EVERY 6 HOURS PRN
Qty: 120 TABLET | Refills: 0 | Status: SHIPPED | OUTPATIENT
Start: 2020-07-28 | End: 2020-08-31

## 2020-08-31 RX ORDER — TRAMADOL HYDROCHLORIDE 50 MG/1
TABLET ORAL
Qty: 120 TABLET | Refills: 0 | Status: SHIPPED | OUTPATIENT
Start: 2020-08-31 | End: 2020-10-01

## 2020-10-01 RX ORDER — TRAMADOL HYDROCHLORIDE 50 MG/1
TABLET ORAL
Qty: 120 TABLET | Refills: 0 | Status: SHIPPED | OUTPATIENT
Start: 2020-10-01 | End: 2020-10-26

## 2020-10-26 RX ORDER — TRAMADOL HYDROCHLORIDE 50 MG/1
TABLET ORAL
Qty: 120 TABLET | Refills: 0 | Status: SHIPPED | OUTPATIENT
Start: 2020-10-26 | End: 2020-12-02

## 2020-12-02 RX ORDER — TRAMADOL HYDROCHLORIDE 50 MG/1
TABLET ORAL
Qty: 120 TABLET | Refills: 0 | Status: SHIPPED | OUTPATIENT
Start: 2020-12-02 | End: 2020-12-23

## 2020-12-23 RX ORDER — TRAMADOL HYDROCHLORIDE 50 MG/1
TABLET ORAL
Qty: 120 TABLET | Refills: 0 | Status: SHIPPED | OUTPATIENT
Start: 2020-12-23 | End: 2021-01-26

## 2021-02-17 DIAGNOSIS — M54.2 CERVICALGIA: ICD-10-CM

## 2021-02-17 RX ORDER — TRAMADOL HYDROCHLORIDE 50 MG/1
TABLET ORAL
Qty: 120 TABLET | Refills: 0 | Status: SHIPPED | OUTPATIENT
Start: 2021-02-17 | End: 2021-03-25

## 2021-02-26 DIAGNOSIS — I10 ESSENTIAL HYPERTENSION: ICD-10-CM

## 2021-02-26 LAB
ANION GAP SERPL CALCULATED.3IONS-SCNC: 9 MMOL/L (ref 3–16)
BUN BLDV-MCNC: 17 MG/DL (ref 7–20)
CALCIUM SERPL-MCNC: 8.9 MG/DL (ref 8.3–10.6)
CHLORIDE BLD-SCNC: 100 MMOL/L (ref 99–110)
CO2: 27 MMOL/L (ref 21–32)
CREAT SERPL-MCNC: 0.9 MG/DL (ref 0.9–1.3)
GFR AFRICAN AMERICAN: >60
GFR NON-AFRICAN AMERICAN: >60
GLUCOSE BLD-MCNC: 87 MG/DL (ref 70–99)
POTASSIUM SERPL-SCNC: 3.9 MMOL/L (ref 3.5–5.1)
SODIUM BLD-SCNC: 136 MMOL/L (ref 136–145)

## 2021-03-25 DIAGNOSIS — M54.2 CERVICALGIA: ICD-10-CM

## 2021-03-25 RX ORDER — TRAMADOL HYDROCHLORIDE 50 MG/1
TABLET ORAL
Qty: 120 TABLET | Refills: 0 | Status: SHIPPED | OUTPATIENT
Start: 2021-03-25 | End: 2021-04-23

## 2021-04-23 DIAGNOSIS — M54.2 CERVICALGIA: ICD-10-CM

## 2021-04-23 RX ORDER — TRAMADOL HYDROCHLORIDE 50 MG/1
TABLET ORAL
Qty: 120 TABLET | Refills: 0 | Status: SHIPPED | OUTPATIENT
Start: 2021-04-23 | End: 2021-05-21

## 2021-05-21 DIAGNOSIS — M54.2 CERVICALGIA: ICD-10-CM

## 2021-05-21 RX ORDER — TRAMADOL HYDROCHLORIDE 50 MG/1
TABLET ORAL
Qty: 120 TABLET | Refills: 0 | Status: SHIPPED | OUTPATIENT
Start: 2021-05-21 | End: 2021-06-21

## 2021-06-21 DIAGNOSIS — M54.2 CERVICALGIA: ICD-10-CM

## 2021-06-21 RX ORDER — TRAMADOL HYDROCHLORIDE 50 MG/1
TABLET ORAL
Qty: 120 TABLET | Refills: 0 | Status: SHIPPED | OUTPATIENT
Start: 2021-06-21 | End: 2021-07-20

## 2021-07-20 DIAGNOSIS — M54.2 CERVICALGIA: ICD-10-CM

## 2021-07-20 RX ORDER — TRAMADOL HYDROCHLORIDE 50 MG/1
TABLET ORAL
Qty: 120 TABLET | Refills: 0 | Status: SHIPPED | OUTPATIENT
Start: 2021-07-20 | End: 2021-08-18

## 2021-08-18 DIAGNOSIS — M54.2 CERVICALGIA: ICD-10-CM

## 2021-08-18 RX ORDER — TRAMADOL HYDROCHLORIDE 50 MG/1
TABLET ORAL
Qty: 120 TABLET | Refills: 0 | Status: SHIPPED | OUTPATIENT
Start: 2021-08-18 | End: 2021-09-16

## 2021-09-16 DIAGNOSIS — M54.2 CERVICALGIA: ICD-10-CM

## 2021-09-16 RX ORDER — TRAMADOL HYDROCHLORIDE 50 MG/1
TABLET ORAL
Qty: 120 TABLET | Refills: 0 | Status: SHIPPED | OUTPATIENT
Start: 2021-09-16 | End: 2021-10-14

## 2021-10-14 DIAGNOSIS — M54.2 CERVICALGIA: ICD-10-CM

## 2021-10-14 RX ORDER — TRAMADOL HYDROCHLORIDE 50 MG/1
TABLET ORAL
Qty: 120 TABLET | Refills: 0 | Status: SHIPPED | OUTPATIENT
Start: 2021-10-14 | End: 2021-11-11

## 2021-11-11 DIAGNOSIS — M54.2 CERVICALGIA: ICD-10-CM

## 2021-11-11 RX ORDER — TRAMADOL HYDROCHLORIDE 50 MG/1
TABLET ORAL
Qty: 120 TABLET | Refills: 0 | Status: SHIPPED | OUTPATIENT
Start: 2021-11-11 | End: 2021-12-13

## 2021-11-11 RX ORDER — NADOLOL 80 MG/1
TABLET ORAL
Qty: 90 TABLET | Refills: 3 | Status: SHIPPED | OUTPATIENT
Start: 2021-11-11

## 2021-11-11 RX ORDER — TRIAMTERENE AND HYDROCHLOROTHIAZIDE 37.5; 25 MG/1; MG/1
TABLET ORAL
Qty: 90 TABLET | Refills: 3 | Status: SHIPPED | OUTPATIENT
Start: 2021-11-11

## 2021-12-13 DIAGNOSIS — M54.2 CERVICALGIA: ICD-10-CM

## 2021-12-13 RX ORDER — TRAMADOL HYDROCHLORIDE 50 MG/1
TABLET ORAL
Qty: 120 TABLET | Refills: 0 | Status: SHIPPED | OUTPATIENT
Start: 2021-12-13 | End: 2022-01-12

## 2022-01-12 DIAGNOSIS — M54.2 CERVICALGIA: ICD-10-CM

## 2022-01-12 RX ORDER — TRAMADOL HYDROCHLORIDE 50 MG/1
TABLET ORAL
Qty: 120 TABLET | Refills: 1 | Status: SHIPPED | OUTPATIENT
Start: 2022-01-12 | End: 2022-02-10

## 2022-01-12 RX ORDER — LISINOPRIL 20 MG/1
20 TABLET ORAL DAILY
Qty: 90 TABLET | Refills: 3 | Status: SHIPPED | OUTPATIENT
Start: 2022-01-12

## 2022-04-07 ENCOUNTER — OFFICE VISIT (OUTPATIENT)
Dept: ORTHOPEDIC SURGERY | Age: 62
End: 2022-04-07
Payer: COMMERCIAL

## 2022-04-07 VITALS — WEIGHT: 195 LBS | BODY MASS INDEX: 28.88 KG/M2 | HEIGHT: 69 IN

## 2022-04-07 DIAGNOSIS — G89.29 CHRONIC PAIN OF RIGHT KNEE: Primary | ICD-10-CM

## 2022-04-07 DIAGNOSIS — M25.561 CHRONIC PAIN OF RIGHT KNEE: Primary | ICD-10-CM

## 2022-04-07 DIAGNOSIS — M71.21 BAKER'S CYST OF KNEE, RIGHT: ICD-10-CM

## 2022-04-07 PROCEDURE — 20610 DRAIN/INJ JOINT/BURSA W/O US: CPT | Performed by: ORTHOPAEDIC SURGERY

## 2022-04-07 PROCEDURE — 99214 OFFICE O/P EST MOD 30 MIN: CPT | Performed by: ORTHOPAEDIC SURGERY

## 2022-04-07 RX ORDER — METHYLPREDNISOLONE ACETATE 40 MG/ML
80 INJECTION, SUSPENSION INTRA-ARTICULAR; INTRALESIONAL; INTRAMUSCULAR; SOFT TISSUE ONCE
Status: COMPLETED | OUTPATIENT
Start: 2022-04-07 | End: 2022-04-07

## 2022-04-07 RX ORDER — SIMVASTATIN 40 MG
1 TABLET ORAL DAILY
COMMUNITY
End: 2022-06-27

## 2022-04-07 RX ORDER — LIDOCAINE HYDROCHLORIDE 10 MG/ML
4 INJECTION, SOLUTION INFILTRATION; PERINEURAL ONCE
Status: COMPLETED | OUTPATIENT
Start: 2022-04-07 | End: 2022-04-07

## 2022-04-07 RX ORDER — LIDOCAINE HYDROCHLORIDE 10 MG/ML
3 INJECTION, SOLUTION INFILTRATION; PERINEURAL ONCE
Status: COMPLETED | OUTPATIENT
Start: 2022-04-07 | End: 2022-04-07

## 2022-04-07 RX ORDER — TRAMADOL HYDROCHLORIDE 50 MG/1
TABLET ORAL
COMMUNITY
Start: 2022-03-14 | End: 2022-05-12

## 2022-04-07 RX ADMIN — LIDOCAINE HYDROCHLORIDE 3 ML: 10 INJECTION, SOLUTION INFILTRATION; PERINEURAL at 10:30

## 2022-04-07 RX ADMIN — LIDOCAINE HYDROCHLORIDE 4 ML: 10 INJECTION, SOLUTION INFILTRATION; PERINEURAL at 10:30

## 2022-04-07 RX ADMIN — METHYLPREDNISOLONE ACETATE 80 MG: 40 INJECTION, SUSPENSION INTRA-ARTICULAR; INTRALESIONAL; INTRAMUSCULAR; SOFT TISSUE at 10:30

## 2022-04-07 NOTE — PROGRESS NOTES
Ariana Smaller seen today for evaluation of his right knee. He is about 1 year of right knee pain especially posteriorly. Causes pressure and tightness. When he was 10years old he had an open removal of a Baker's cyst.  He is concerned it has returned. He uses tramadol and ibuprofen intermittently. He has pain that is 4 out of 10 with squatting. Past history significant for hypertension. He is retiring from the [employer] at some point this year. History: Patient's relevant past family, medical, and social history are reviewed as part of today's visit. ROS of pertinent positives and negatives as above; otherwise negative. General Exam:    Vitals: Height 5' 9\" (1.753 m), weight 195 lb (88.5 kg). Constitutional: Patient is adequately groomed with no evidence of malnutrition  Mental Status: The patient is oriented to time, place and person. The patient's mood and affect are appropriate. Gait:  Patient walks with normal gait and station. Lymphatic: The lymphatic examination bilaterally reveals all areas to be without enlargement or induration. Vascular: Examination reveals no swelling or calf tenderness. Peripheral pulses are palpable and 2+. Neurological: The patient has good coordination. There is no weakness or sensory deficit. Skin:    Head/Neck: inspection reveals no rashes, ulcerations or lesions. Trunk:  inspection reveals no rashes, ulcerations or lesions. Right Lower Extremity: inspection reveals no rashes, ulcerations or lesions. Left Lower Extremity: inspection reveals no rashes, ulcerations or lesions. Examination of the bilateral hips reveals normal flexion and extension. There is no restriction in rotation. There is no tenderness to palpation anteriorly posteriorly or laterally. Left knee examination demonstrates no effusion. There is no tenderness to palpation over the medial or lateral joint line. There is no discomfort over the patellar tendon. There is no palpable popliteal cyst.  Sensation is intact. Range of motion is normal.  There is no patellofemoral crepitation. There is no instability to varus or valgus stress applied at 0, 30, 60, or 90Â° of flexion. There is no anterior or posterior drawer. Lachman examination is normal.  Right knee has mild crepitation. He has no joint line pain or drainable effusion. He has a well-healed transverse incision through the popliteal fossa from his prior surgery. He does have fullness in the popliteal fossa consistent with Baker's cyst.  Calf is soft. He has normal stability. X-rays were obtained today in the office and interpreted by me. AP standing, PA flexed, and merchant views of the bilateral knees as well as a lateral of the right knee. These demonstrate: Mild degenerative change but no acute bony abnormalities. Assessment: Symptomatic right knee Baker's cyst.    Plan: Aspiration of the Baker's cyst and an intra-articular knee injection. Procedure: Under sterile technique, the right knee was anesthetized over the palpable Baker's cyst posteriorly. I then aspirated the cyst of approximately 2 or 3 cc of fluid. Right knee was then injected with 80 mg of Depo-Medrol and 40 mg of lidocaine anteriorly into the anterolateral arthroscopy portal.    He will ice his knee tonight. Follow-up with me in a month.

## 2022-05-09 ENCOUNTER — OFFICE VISIT (OUTPATIENT)
Dept: ORTHOPEDIC SURGERY | Age: 62
End: 2022-05-09
Payer: COMMERCIAL

## 2022-05-09 VITALS — HEIGHT: 69 IN | BODY MASS INDEX: 28.88 KG/M2 | WEIGHT: 195 LBS

## 2022-05-09 DIAGNOSIS — M71.21 BAKER'S CYST OF KNEE, RIGHT: ICD-10-CM

## 2022-05-09 DIAGNOSIS — M25.561 CHRONIC PAIN OF RIGHT KNEE: Primary | ICD-10-CM

## 2022-05-09 DIAGNOSIS — G89.29 CHRONIC PAIN OF RIGHT KNEE: Primary | ICD-10-CM

## 2022-05-09 PROCEDURE — 99212 OFFICE O/P EST SF 10 MIN: CPT | Performed by: ORTHOPAEDIC SURGERY

## 2022-05-09 NOTE — PROGRESS NOTES
Ángel Ramires returns today for his right knee. He says he is at least 50% better. He still gets some discomfort with deep kneeling but overall is doing well. General Exam:    Vitals: Height 5' 9\" (1.753 m), weight 195 lb (88.5 kg). Constitutional: Patient is adequately groomed with no evidence of malnutrition  Mental Status: The patient is oriented to time, place and person. The patient's mood and affect are appropriate. Right knee today has no joint line pain. He does have some discomfort posteriorly. He has full range of motion. He has no effusion and no palpable Baker's cyst.    Assessment: Improving symptoms after cortisone injection    Plan: He has recurrent symptoms I recommend an MRI scan to evaluate his previously surgically excised Alcala's cyst.  He agrees.

## 2022-06-03 ENCOUNTER — OFFICE VISIT (OUTPATIENT)
Dept: ORTHOPEDIC SURGERY | Age: 62
End: 2022-06-03
Payer: COMMERCIAL

## 2022-06-03 VITALS — BODY MASS INDEX: 28.88 KG/M2 | WEIGHT: 195 LBS | HEIGHT: 69 IN

## 2022-06-03 DIAGNOSIS — S46.012A TRAUMATIC TEAR OF LEFT ROTATOR CUFF, UNSPECIFIED TEAR EXTENT, INITIAL ENCOUNTER: ICD-10-CM

## 2022-06-03 DIAGNOSIS — M25.512 ACUTE PAIN OF LEFT SHOULDER: Primary | ICD-10-CM

## 2022-06-03 PROCEDURE — 99214 OFFICE O/P EST MOD 30 MIN: CPT | Performed by: ORTHOPAEDIC SURGERY

## 2022-06-03 NOTE — PROGRESS NOTES
Alejandrina Zhang is seen today for evaluation of left shoulder pain. His pain started about 3 months ago when he was injured training his horse. He has pain anteriorly over the bicep tendon and and throughout the entire upper arm. Is particularly painful reaching overhead, behind his back, and at nighttime. Pain is about 5 out of 10. He has chronic migraines and takes tramadol 3 or 4 times a day that does not seem to help. I treated the left shoulder with physical therapy and an injection in 2019 and that was beneficial until about 3 months ago. Recently we treated his knee. He works at the [employer] and will be retiring soon. Past history also significant for high blood pressure and high cholesterol. History: Patient's relevant past family, medical, and social history are reviewed as part of today's visit. ROS of pertinent positives and negatives as above; otherwise negative. General Exam:    Vitals: Height 5' 9\" (1.753 m), weight 195 lb (88.5 kg). Constitutional: Patient is adequately groomed with no evidence of malnutrition  Mental Status: The patient is oriented to time, place and person. The patient's mood and affect are appropriate. Gait:  Patient walks with normal gait and station. Lymphatic: The lymphatic examination bilaterally reveals all areas to be without enlargement or induration. Vascular: Examination reveals no swelling or calf tenderness. Peripheral pulses are palpable and 2+. Neurological: The patient has good coordination. There is no weakness or sensory deficit. Skin:    Head/Neck: inspection reveals no rashes, ulcerations or lesions. Trunk:  inspection reveals no rashes, ulcerations or lesions. Right Lower Extremity: inspection reveals no rashes, ulcerations or lesions. Left Lower Extremity: inspection reveals no rashes, ulcerations or lesions. Examination of the cervical spine reveals no restriction in motion.   There are no reproduction of symptoms into either arm with flexion, extension, rotation or palpation. The patient has a negative Spurling sign, and no tenderness. Examination of the right shoulder reveals normal scapular control and no prominence. There is no pain over the acromioclavicular or sternoclavicular joints. The patient has no biceps pain. There is full range of motion. There is no pain with impingement testing. There is no pain with Townsend maneuver. Charles's maneuver is normal.  There is no pain or apprehension in the abducted externally rotated position. There is no sulcus sign. There is no instability with anterior or posterior stress applied. The patient demonstrates full strength in the supraspinatus, infraspinatus, and subscapularis. Neurologic and vascular examination of the upper extremity  is normal.    Left shoulder has no pain over the [facility] or SC joint. He has moderate pain over the bicep. He has 3+/5 strength in abduction and 4/5 in external rotation with significant pain with cross body adduction and over the long head bicep. Left shoulder x-rays obtained today in the office and interpreted by me AP in the scapular plane, axillary lateral, and scapular Y demonstrate: No acute bony abnormalities    Assessment: Traumatic rotator cuff tear left shoulder. Plan: MRI scan left shoulder.   Follow-up with me after the scan

## 2022-06-10 ENCOUNTER — OFFICE VISIT (OUTPATIENT)
Dept: ORTHOPEDIC SURGERY | Age: 62
End: 2022-06-10
Payer: COMMERCIAL

## 2022-06-10 VITALS — HEIGHT: 69 IN | BODY MASS INDEX: 28.88 KG/M2 | WEIGHT: 195 LBS

## 2022-06-10 DIAGNOSIS — S46.012A TRAUMATIC TEAR OF LEFT ROTATOR CUFF, UNSPECIFIED TEAR EXTENT, INITIAL ENCOUNTER: Primary | ICD-10-CM

## 2022-06-10 DIAGNOSIS — M25.512 ACUTE PAIN OF LEFT SHOULDER: ICD-10-CM

## 2022-06-10 PROCEDURE — 99214 OFFICE O/P EST MOD 30 MIN: CPT | Performed by: ORTHOPAEDIC SURGERY

## 2022-06-10 PROCEDURE — L3670 SO ACRO/CLAV CAN WEB PRE OTS: HCPCS | Performed by: ORTHOPAEDIC SURGERY

## 2022-06-10 NOTE — PROGRESS NOTES
Rudy Fernandez returns today to follow-up his left shoulder. Pains about 4 out of 10 but sleep is particularly challenging. General Exam:    Vitals: Height 5' 9\" (1.753 m), weight 195 lb (88.5 kg). Constitutional: Patient is adequately groomed with no evidence of malnutrition  Mental Status: The patient is oriented to time, place and person. The patient's mood and affect are appropriate. Left shoulder has weakness in abduction and external rotation. He has moderate pain anteriorly but no AC joint pain. Strength is about 3+/5 in abduction. Left shoulder MRI is reviewed. It demonstrates:     FINDINGS:  Long head biceps arcuate and extra-articular segment hypertrophic tendinopathy and    interstitial longitudinal tearing.       Full-thickness frayed irregular tear involves the mid to distal supraspinatus measuring 2.4 x    2.6 cm.  Partial-thickness interstitial tearing involves distal superior attachment of the    subscapularis.  Infraspinatus is tendinopathic with interstitial micro tearing at its insertion       Teres minor is spared.       Posterior and superior labral tearing present.  Inferior labral tearing also present.     Paralabral cyst dissects inferior to the glenoid.       No muscle strain.  No muscle atrophy        Outlet-related cuff impingement [facility] joint hypertrophy and a lateral downsloping    hypertrophied type 2 acromion.  AC joint is degenerated.  Reactive synovial thickening noted at    the site of cuff tearing.       CONCLUSION:   1. Full-thickness frayed irregular tear mid to distal supraspinatus measures 2.4 x 2.6 cm. Frayed and irregular margins and bursitis present.  Partial tearing distal superior attachment    of the subscapularis and minimally the interstitial attachment of the infraspinatus. 2. Nondisplaced posterior and superior as well as inferior labral tearing.  Paralabral cyst    dissecting inferior to the glenoid. 3. Biceps instability. Arcuate and extra-articular segment hypertrophic tendinopathy with    interstitial longitudinal tearing. 4. Outlet-related cuff impingement secondary to [facility] joint hypertrophy and a lateral downsloping    hypertrophied type 2 acromion. AC joint is degenerated. I reviewed these findings on the report and the images with the patient and personally interpreted the scan. Assessment: Large full-thickness rotator cuff tear left shoulder with bicep tearing and instability. Plan: We reviewed the risks, benefits, and alternatives to surgery. The alternatives include conservative management including medications, injections, and physical therapy as well as observation. Risks of surgery include but are not limited to persistent pain, instability, and reinjury. Risks also include risk of infection which could result in the need for further surgery and long-term use of antibiotics. Risks also include deep venous thromboses and pulmonary emboli. Risks also include problems with anesthesia including but not limited to cardiovascular compromise , stroke,  and death. The patient understands that the goal of surgery is to improve pain and function but that can never be guaranteed. We are going to proceed with left shoulder arthroscopy with rotator cuff repair and bicep tenodesis. We discussed recovery at length. Discussed complications including that of nonhealing, infection, and anesthetic or neurovascular compromise. He will be fit with a sling today. He will need to see Dr. Neisha Ag for preoperative clearance. We will schedule surgery sometime next month after he returns from vacation. Medical decision making today was moderate. Procedures    DJO ultrasling IV Shoulder Sling     Patient was prescribed a DJO Ultrasling IV Shoulder Brace. The left shoulder will require stabilization / immobilization from this orthosis.   The orthosis will assist in protecting the affected area, provide functional support and facilitate healing. The device was ordered and fit on 6/10/2022. The patient was educated and fit by a healthcare professional with expert knowledge and specialization in brace application while under the direct supervision of the treating physician. Verbal and written instructions for the use of and application of this item were provided. They were instructed to contact the office immediately should the brace result in increased pain, decreased sensation, increased swelling or worsening of the condition.

## 2022-06-17 ENCOUNTER — TELEPHONE (OUTPATIENT)
Dept: ORTHOPEDIC SURGERY | Age: 62
End: 2022-06-17

## 2022-06-22 ENCOUNTER — TELEPHONE (OUTPATIENT)
Dept: ORTHOPEDIC SURGERY | Age: 62
End: 2022-06-22

## 2022-06-22 NOTE — TELEPHONE ENCOUNTER
CPT: [id number], [id number]  BODY PART: left shoulder  STATUS: outpatient  LOCATION: [place]  AUTHORIZATION: NPR    Per [facility] website, [facility]

## 2022-07-06 ENCOUNTER — TELEPHONE (OUTPATIENT)
Dept: ORTHOPEDIC SURGERY | Age: 62
End: 2022-07-06

## 2022-07-06 NOTE — TELEPHONE ENCOUNTER
Faxed revised  la to [address] [facility] Implemented All Universal Safety Interventions:  [place] to call system. Call bell, personal items and telephone within reach. Instruct patient to call for assistance. Room bathroom lighting operational. Non-slip footwear when patient is off stretcher. Physically safe environment: no spills, clutter or unnecessary equipment. Stretcher in lowest position, wheels locked, appropriate side rails in place.

## 2022-07-07 NOTE — PROGRESS NOTES
[facility] time_______0700_____        Surgery time_______0830_____    Take the following medications with a sip of water: Follow your MD/Surgeons pre-procedure instructions regarding your medications     Do not eat or drink anything after 12:00 midnight prior to your surgery. This includes water chewing gum, mints and ice chips. You may brush your teeth and gargle the morning of your surgery, but do not swallow the water     Please see your family doctor/pediatrician for a history and physical and/or concerning medications. Bring any test results/reports from your physicians office. If you are under the care of a heart doctor or specialist doctor, please be aware that you may be asked to them for clearance    You may be asked to stop blood thinners such as Coumadin, Plavix, Fragmin, Lovenox, etc., or any anti-inflammatories such as:  Aspirin, Ibuprofen, Advil, Naproxen prior to your surgery. We also ask that you stop any OTC medications such as fish oil, vitamin E, glucosamine, garlic, Multivitamins, COQ 10, etc.    We ask that you do not smoke 24 hours prior to surgery  We ask that you do not  drink any alcoholic beverages 24 hours prior to surgery     You must make arrangements for a responsible adult to take you home after your surgery. For your safety you will not be allowed to leave alone or drive yourself home. Your surgery will be cancelled if you do not have a ride home. Also for your safety, it is strongly suggested that someone stay with you the first 24 hours after your surgery. A parent or legal guardian must accompany a child scheduled for surgery and plan to stay at the hospital until the child is discharged. Please do not bring other children with you. For your comfort, please wear simple loose fitting clothing to the hospital.  Please do not bring valuables.     Do not wear any make-up or nail polish on your fingers or toes      For your safety, please do not wear any jewelry or body piercing's on the day of surgery. All jewelry must be removed. If you have dentures, they will be removed before going to operating room. For your convenience, we will provide you with a container. If you wear contact lenses or glasses, they will be removed, please bring a case for them. If you have a living will and a durable power of  for healthcare, please bring in a copy. As part of our patient safety program to minimize surgical site infections, we ask you to do the following:    · Please notify your surgeon if you develop any illness between         now and the  day of your surgery. · This includes a cough, cold, fever, sore throat, nausea,         or vomiting, and diarrhea, etc.  ·  Please notify your surgeon if you experience dizziness, shortness         of breath or blurred vision between now and the time of your surgery. Do not shave your operative site 96 hours prior to surgery. For face and neck surgery, men may use an electric razor 48 hours   prior to surgery. You may shower the night before surgery or the morning of   your surgery with an antibacterial soap. You will need to bring a photo ID and insurance card    [facility] has an onsite pharmacy, would you like to utilize our pharmacy     If you will be staying overnight and use a C-pap machine, please bring   your C-pap to hospital     Our goal is to provide you with excellent care, therefore, visitors will be limited to two(2) in the room at a time so that we may focus on providing this care for you. Please contact pre-admission testing if you have any further questions. [facility] phone number:  [phone] PAT fax number:  [phone]  Please note these are generalized instructions for all surgical cases, you may be provided with more specific instructions according to your surgery.     C-Difficile admission screening and protocol:       * Admitted with diarrhea? [] YES    [x]  NO     *Prior history of C-Diff. In last 3 months? [] YES    [x]  NO     *Antibiotic use in the past 6-8 weeks? [x]  NO    []  YES                 If yes, which ANTIBIOTIC AND REASON______     *Prior hospitalization or nursing home in the last month? []  YES    [x]  NO        SAFETY FIRST. .call before you fall

## 2022-07-12 ENCOUNTER — TELEPHONE (OUTPATIENT)
Dept: ORTHOPEDIC SURGERY | Age: 62
End: 2022-07-12

## 2022-07-12 ENCOUNTER — ANESTHESIA EVENT (OUTPATIENT)
Dept: OPERATING ROOM | Age: 62
End: 2022-07-12
Payer: COMMERCIAL

## 2022-07-12 DIAGNOSIS — M25.512 ACUTE PAIN OF LEFT SHOULDER: ICD-10-CM

## 2022-07-12 DIAGNOSIS — S46.012A TRAUMATIC TEAR OF LEFT ROTATOR CUFF, UNSPECIFIED TEAR EXTENT, INITIAL ENCOUNTER: Primary | ICD-10-CM

## 2022-07-12 RX ORDER — DOCUSATE SODIUM 100 MG/1
100 CAPSULE, LIQUID FILLED ORAL 2 TIMES DAILY
Qty: 60 CAPSULE | Refills: 0 | Status: SHIPPED | OUTPATIENT
Start: 2022-07-13 | End: 2022-08-25

## 2022-07-12 RX ORDER — PROMETHAZINE HYDROCHLORIDE 25 MG/1
25 TABLET ORAL EVERY 6 HOURS PRN
Qty: 30 TABLET | Refills: 0 | Status: SHIPPED | OUTPATIENT
Start: 2022-07-13 | End: 2022-07-19

## 2022-07-12 RX ORDER — OXYCODONE HYDROCHLORIDE AND ACETAMINOPHEN 5; 325 MG/1; MG/1
1 TABLET ORAL EVERY 6 HOURS PRN
Qty: 20 TABLET | Refills: 0 | Status: SHIPPED | OUTPATIENT
Start: 2022-07-13 | End: 2022-07-18

## 2022-07-12 NOTE — TELEPHONE ENCOUNTER
Called patient regarding surgery. Spoke with patient. Patient will arrive at [facility] at 0700 for surgery at 0830 with sling. NPO at midnight.

## 2022-07-13 ENCOUNTER — ANESTHESIA (OUTPATIENT)
Dept: OPERATING ROOM | Age: 62
End: 2022-07-13
Payer: COMMERCIAL

## 2022-07-13 ENCOUNTER — HOSPITAL ENCOUNTER (OUTPATIENT)
Age: 62
Setting detail: OUTPATIENT SURGERY
Discharge: HOME | End: 2022-07-13
Attending: ORTHOPAEDIC SURGERY | Admitting: ORTHOPAEDIC SURGERY
Payer: COMMERCIAL

## 2022-07-13 VITALS
OXYGEN SATURATION: 97 % | DIASTOLIC BLOOD PRESSURE: 89 MMHG | HEART RATE: 65 BPM | HEIGHT: 70 IN | TEMPERATURE: 97.2 F | RESPIRATION RATE: 14 BRPM | SYSTOLIC BLOOD PRESSURE: 139 MMHG | BODY MASS INDEX: 28.36 KG/M2 | WEIGHT: 198.11 LBS

## 2022-07-13 PROCEDURE — 7100000001 HC PACU RECOVERY - ADDTL 15 MIN: Performed by: ORTHOPAEDIC SURGERY

## 2022-07-13 PROCEDURE — 3700000001 HC ADD 15 MINUTES (ANESTHESIA): Performed by: ORTHOPAEDIC SURGERY

## 2022-07-13 PROCEDURE — 6360000002 HC RX W HCPCS: Performed by: ORTHOPAEDIC SURGERY

## 2022-07-13 PROCEDURE — 2580000003 HC RX 258: Performed by: ORTHOPAEDIC SURGERY

## 2022-07-13 PROCEDURE — C1713 ANCHOR/SCREW BN/BN,TIS/BN: HCPCS | Performed by: ORTHOPAEDIC SURGERY

## 2022-07-13 PROCEDURE — 7100000000 HC PACU RECOVERY - FIRST 15 MIN: Performed by: ORTHOPAEDIC SURGERY

## 2022-07-13 PROCEDURE — 2720000010 HC SURG SUPPLY STERILE: Performed by: ORTHOPAEDIC SURGERY

## 2022-07-13 PROCEDURE — 2500000003 HC RX 250 WO HCPCS

## 2022-07-13 PROCEDURE — 64415 NJX AA&/STRD BRCH PLXS IMG: CPT | Performed by: ANESTHESIOLOGY

## 2022-07-13 PROCEDURE — 3700000000 HC ANESTHESIA ATTENDED CARE: Performed by: ORTHOPAEDIC SURGERY

## 2022-07-13 PROCEDURE — 3600000014 HC SURGERY LEVEL 4 ADDTL 15MIN: Performed by: ORTHOPAEDIC SURGERY

## 2022-07-13 PROCEDURE — 2500000003 HC RX 250 WO HCPCS: Performed by: ORTHOPAEDIC SURGERY

## 2022-07-13 PROCEDURE — 7100000010 HC PHASE II RECOVERY - FIRST 15 MIN: Performed by: ORTHOPAEDIC SURGERY

## 2022-07-13 PROCEDURE — 2580000003 HC RX 258: Performed by: ANESTHESIOLOGY

## 2022-07-13 PROCEDURE — 6360000002 HC RX W HCPCS

## 2022-07-13 PROCEDURE — 2709999900 HC NON-CHARGEABLE SUPPLY: Performed by: ORTHOPAEDIC SURGERY

## 2022-07-13 PROCEDURE — 6360000002 HC RX W HCPCS: Performed by: ANESTHESIOLOGY

## 2022-07-13 PROCEDURE — C1776 JOINT DEVICE (IMPLANTABLE): HCPCS | Performed by: ORTHOPAEDIC SURGERY

## 2022-07-13 PROCEDURE — 7100000011 HC PHASE II RECOVERY - ADDTL 15 MIN: Performed by: ORTHOPAEDIC SURGERY

## 2022-07-13 PROCEDURE — 3600000004 HC SURGERY LEVEL 4 BASE: Performed by: ORTHOPAEDIC SURGERY

## 2022-07-13 DEVICE — KIT IMPL SYS PROX TENODESIS W/ BICEPSBUTTON INSRT FIBERLOOP: Type: IMPLANTABLE DEVICE | Site: SHOULDER | Status: FUNCTIONAL

## 2022-07-13 DEVICE — ANCHOR SUT L14.7MM DIA5.5MM BIOCOMPOSITE W/ 3 SZ 2: Type: IMPLANTABLE DEVICE | Site: SHOULDER | Status: FUNCTIONAL

## 2022-07-13 RX ORDER — MIDAZOLAM HYDROCHLORIDE 1 MG/ML
INJECTION INTRAMUSCULAR; INTRAVENOUS PRN
Status: DISCONTINUED | OUTPATIENT
Start: 2022-07-13 | End: 2022-07-13 | Stop reason: SDUPTHER

## 2022-07-13 RX ORDER — PROPOFOL 10 MG/ML
INJECTION, EMULSION INTRAVENOUS PRN
Status: DISCONTINUED | OUTPATIENT
Start: 2022-07-13 | End: 2022-07-13 | Stop reason: SDUPTHER

## 2022-07-13 RX ORDER — SODIUM CHLORIDE 0.9 % (FLUSH) 0.9 %
5-40 SYRINGE (ML) INJECTION PRN
Status: DISCONTINUED | OUTPATIENT
Start: 2022-07-13 | End: 2022-07-13 | Stop reason: HOSPADM

## 2022-07-13 RX ORDER — FENTANYL CITRATE 50 UG/ML
25 INJECTION, SOLUTION INTRAMUSCULAR; INTRAVENOUS EVERY 5 MIN PRN
Status: DISCONTINUED | OUTPATIENT
Start: 2022-07-13 | End: 2022-07-13 | Stop reason: HOSPADM

## 2022-07-13 RX ORDER — LIDOCAINE HYDROCHLORIDE 10 MG/ML
INJECTION, SOLUTION EPIDURAL; INFILTRATION; INTRACAUDAL; PERINEURAL
Status: DISCONTINUED
Start: 2022-07-13 | End: 2022-07-13 | Stop reason: HOSPADM

## 2022-07-13 RX ORDER — OXYCODONE HYDROCHLORIDE 10 MG/1
10 TABLET ORAL PRN
Status: DISCONTINUED | OUTPATIENT
Start: 2022-07-13 | End: 2022-07-13 | Stop reason: HOSPADM

## 2022-07-13 RX ORDER — SODIUM CHLORIDE 0.9 % (FLUSH) 0.9 %
5-40 SYRINGE (ML) INJECTION EVERY 12 HOURS SCHEDULED
Status: DISCONTINUED | OUTPATIENT
Start: 2022-07-13 | End: 2022-07-13 | Stop reason: HOSPADM

## 2022-07-13 RX ORDER — ROPIVACAINE HYDROCHLORIDE 5 MG/ML
INJECTION, SOLUTION EPIDURAL; INFILTRATION; PERINEURAL
Status: DISCONTINUED | OUTPATIENT
Start: 2022-07-13 | End: 2022-07-13 | Stop reason: SDUPTHER

## 2022-07-13 RX ORDER — ONDANSETRON 2 MG/ML
INJECTION INTRAMUSCULAR; INTRAVENOUS PRN
Status: DISCONTINUED | OUTPATIENT
Start: 2022-07-13 | End: 2022-07-13 | Stop reason: SDUPTHER

## 2022-07-13 RX ORDER — SODIUM CHLORIDE, SODIUM LACTATE, POTASSIUM CHLORIDE, CALCIUM CHLORIDE 600; 310; 30; 20 MG/100ML; MG/100ML; MG/100ML; MG/100ML
INJECTION, SOLUTION INTRAVENOUS CONTINUOUS PRN
Status: COMPLETED | OUTPATIENT
Start: 2022-07-13 | End: 2022-07-13

## 2022-07-13 RX ORDER — ROCURONIUM BROMIDE 10 MG/ML
INJECTION, SOLUTION INTRAVENOUS PRN
Status: DISCONTINUED | OUTPATIENT
Start: 2022-07-13 | End: 2022-07-13 | Stop reason: SDUPTHER

## 2022-07-13 RX ORDER — ONDANSETRON 2 MG/ML
4 INJECTION INTRAMUSCULAR; INTRAVENOUS
Status: DISCONTINUED | OUTPATIENT
Start: 2022-07-13 | End: 2022-07-13 | Stop reason: HOSPADM

## 2022-07-13 RX ORDER — GLYCOPYRROLATE 0.2 MG/ML
INJECTION INTRAMUSCULAR; INTRAVENOUS PRN
Status: DISCONTINUED | OUTPATIENT
Start: 2022-07-13 | End: 2022-07-13 | Stop reason: SDUPTHER

## 2022-07-13 RX ORDER — OXYCODONE HYDROCHLORIDE 5 MG/1
5 TABLET ORAL PRN
Status: DISCONTINUED | OUTPATIENT
Start: 2022-07-13 | End: 2022-07-13 | Stop reason: HOSPADM

## 2022-07-13 RX ORDER — DEXAMETHASONE SODIUM PHOSPHATE 4 MG/ML
INJECTION, SOLUTION INTRA-ARTICULAR; INTRALESIONAL; INTRAMUSCULAR; INTRAVENOUS; SOFT TISSUE PRN
Status: DISCONTINUED | OUTPATIENT
Start: 2022-07-13 | End: 2022-07-13 | Stop reason: SDUPTHER

## 2022-07-13 RX ORDER — BUPIVACAINE HYDROCHLORIDE AND EPINEPHRINE 2.5; 5 MG/ML; UG/ML
INJECTION, SOLUTION EPIDURAL; INFILTRATION; INTRACAUDAL; PERINEURAL
Status: COMPLETED | OUTPATIENT
Start: 2022-07-13 | End: 2022-07-13

## 2022-07-13 RX ORDER — PHENYLEPHRINE HYDROCHLORIDE 10 MG/ML
INJECTION INTRAVENOUS PRN
Status: DISCONTINUED | OUTPATIENT
Start: 2022-07-13 | End: 2022-07-13 | Stop reason: SDUPTHER

## 2022-07-13 RX ORDER — FENTANYL CITRATE 50 UG/ML
INJECTION, SOLUTION INTRAMUSCULAR; INTRAVENOUS PRN
Status: DISCONTINUED | OUTPATIENT
Start: 2022-07-13 | End: 2022-07-13 | Stop reason: SDUPTHER

## 2022-07-13 RX ORDER — SODIUM CHLORIDE 9 MG/ML
INJECTION, SOLUTION INTRAVENOUS PRN
Status: DISCONTINUED | OUTPATIENT
Start: 2022-07-13 | End: 2022-07-13 | Stop reason: HOSPADM

## 2022-07-13 RX ORDER — DROPERIDOL 2.5 MG/ML
0.62 INJECTION, SOLUTION INTRAMUSCULAR; INTRAVENOUS
Status: DISCONTINUED | OUTPATIENT
Start: 2022-07-13 | End: 2022-07-13 | Stop reason: HOSPADM

## 2022-07-13 RX ORDER — MIDAZOLAM HYDROCHLORIDE 1 MG/ML
INJECTION INTRAMUSCULAR; INTRAVENOUS
Status: COMPLETED
Start: 2022-07-13 | End: 2022-07-13

## 2022-07-13 RX ORDER — LIDOCAINE HYDROCHLORIDE 20 MG/ML
INJECTION, SOLUTION EPIDURAL; INFILTRATION; INTRACAUDAL; PERINEURAL PRN
Status: DISCONTINUED | OUTPATIENT
Start: 2022-07-13 | End: 2022-07-13 | Stop reason: SDUPTHER

## 2022-07-13 RX ADMIN — PHENYLEPHRINE HYDROCHLORIDE 100 MCG: 10 INJECTION INTRAVENOUS at 09:12

## 2022-07-13 RX ADMIN — DEXAMETHASONE SODIUM PHOSPHATE 10 MG: 4 INJECTION, SOLUTION INTRAMUSCULAR; INTRAVENOUS at 08:35

## 2022-07-13 RX ADMIN — PROPOFOL 150 MG: 10 INJECTION, EMULSION INTRAVENOUS at 08:23

## 2022-07-13 RX ADMIN — SODIUM CHLORIDE: 9 INJECTION, SOLUTION INTRAVENOUS at 07:22

## 2022-07-13 RX ADMIN — PHENYLEPHRINE HYDROCHLORIDE 100 MCG: 10 INJECTION INTRAVENOUS at 08:57

## 2022-07-13 RX ADMIN — GLYCOPYRROLATE 0.2 MG: 0.2 INJECTION, SOLUTION INTRAMUSCULAR; INTRAVENOUS at 08:34

## 2022-07-13 RX ADMIN — PROPOFOL 50 MG: 10 INJECTION, EMULSION INTRAVENOUS at 09:27

## 2022-07-13 RX ADMIN — SODIUM CHLORIDE: 9 INJECTION, SOLUTION INTRAVENOUS at 08:17

## 2022-07-13 RX ADMIN — FENTANYL CITRATE 25 MCG: 50 INJECTION INTRAMUSCULAR; INTRAVENOUS at 09:52

## 2022-07-13 RX ADMIN — MIDAZOLAM 2 MG: 1 INJECTION INTRAMUSCULAR; INTRAVENOUS at 07:20

## 2022-07-13 RX ADMIN — FENTANYL CITRATE 50 MCG: 50 INJECTION INTRAMUSCULAR; INTRAVENOUS at 09:45

## 2022-07-13 RX ADMIN — ROPIVACAINE HYDROCHLORIDE 15 ML: 5 INJECTION, SOLUTION EPIDURAL; INFILTRATION; PERINEURAL at 07:20

## 2022-07-13 RX ADMIN — CEFAZOLIN 2000 MG: 2 INJECTION, POWDER, FOR SOLUTION INTRAMUSCULAR; INTRAVENOUS at 08:27

## 2022-07-13 RX ADMIN — Medication 1000 MG: at 08:43

## 2022-07-13 RX ADMIN — LIDOCAINE HYDROCHLORIDE 100 MG: 20 INJECTION, SOLUTION EPIDURAL; INFILTRATION; INTRACAUDAL; PERINEURAL at 08:23

## 2022-07-13 RX ADMIN — ONDANSETRON 4 MG: 2 INJECTION INTRAMUSCULAR; INTRAVENOUS at 08:35

## 2022-07-13 RX ADMIN — FENTANYL CITRATE 25 MCG: 50 INJECTION INTRAMUSCULAR; INTRAVENOUS at 09:55

## 2022-07-13 RX ADMIN — PHENYLEPHRINE HYDROCHLORIDE 100 MCG: 10 INJECTION INTRAVENOUS at 09:21

## 2022-07-13 RX ADMIN — SUGAMMADEX 200 MG: 100 INJECTION, SOLUTION INTRAVENOUS at 09:44

## 2022-07-13 RX ADMIN — ROCURONIUM BROMIDE 50 MG: 10 INJECTION INTRAVENOUS at 08:23

## 2022-07-13 ASSESSMENT — PAIN DESCRIPTION - LOCATION: LOCATION: OTHER (COMMENT)

## 2022-07-13 ASSESSMENT — PAIN DESCRIPTION - DESCRIPTORS: DESCRIPTORS: BURNING

## 2022-07-13 ASSESSMENT — PAIN DESCRIPTION - ORIENTATION: ORIENTATION: LEFT

## 2022-07-13 ASSESSMENT — ENCOUNTER SYMPTOMS: SHORTNESS OF BREATH: 0

## 2022-07-13 ASSESSMENT — PAIN - FUNCTIONAL ASSESSMENT: PAIN_FUNCTIONAL_ASSESSMENT: 0-10

## 2022-07-13 ASSESSMENT — PAIN SCALES - GENERAL
PAINLEVEL_OUTOF10: 1
PAINLEVEL_OUTOF10: 0

## 2022-07-13 NOTE — PROGRESS NOTES
Pt transfers to chair. LUE elevated on pillow with sling in place. Able to wiggle fingers with cap refill under 3 seconds. Rates pain 1/10 left axilla area. Ice pack moved to axilla area. Sips water. Spouse at side answers questions.

## 2022-07-13 NOTE — ANESTHESIA PROCEDURE NOTES
Peripheral Block    Patient location during procedure: pre-op  Reason for block: procedure for pain and at surgeon's request  Start time: 7/13/2022 7:20 AM  End time: 7/13/2022 7:30 AM  Staffing  Performed: anesthesiologist   Anesthesiologist: Kedar Courtney MD  Preanesthetic Checklist  Completed: patient identified, IV checked, site marked, risks and benefits discussed, surgical/procedural consents, equipment checked, pre-op evaluation, timeout performed, anesthesia consent given, oxygen available, monitors applied/VS acknowledged, fire risk safety assessment completed and verbalized and blood product R/B/A discussed and consented  Peripheral Block   Patient position: supine  Prep: ChloraPrep  Provider prep: mask, sterile gloves and sterile gown  Patient monitoring: continuous pulse ox, oxygen and IV access  Block type: Brachial plexus  Interscalene  Laterality: left  Injection technique: single-shot  Guidance: ultrasound guided  Local infiltration: ropivacaine  Infiltration strength: 2 %  Local infiltration: ropivacaine  Dose: 2 mL    Needle   Needle type: insulated echogenic nerve stimulator needle   Needle gauge: 21 G  Needle localization: ultrasound guidance  Needle length: 10 cm  Assessment   Injection assessment: negative aspiration for heme, no intravascular symptoms and local visualized surrounding nerve on ultrasound  Paresthesia pain: immediately resolved  Slow fractionated injection: yes  Hemodynamics: stable  Real-time US image taken/store: yes  Outcomes: uncomplicated    Medications Administered  Ropivacaine (NAROPIN) injection 0.5%, 15 mL

## 2022-07-13 NOTE — ANESTHESIA POSTPROCEDURE EVALUATION
Department of Anesthesiology  Postprocedure Note    Patient: Shelley Burger  MRN: [id number]  YOB: 1960  Date of evaluation: 7/13/2022      Procedure Summary     Date: 07/13/22 Room / Location: [address]    Anesthesia Start: 7831 Anesthesia Stop: 1001    Procedures:       LEFT SHOULDER ARTHROSCOPY ROTATOR CUFF REPAIR (Left Shoulder)      BICEP TENODESIS (Left Arm Upper) Diagnosis:       Traumatic tear of left rotator cuff, initial encounter      (LEFT SHOULDER ROTATOR CUFF TEAR)    Surgeons: Sindi Teran MD Responsible Provider: Kalina Chandra MD    Anesthesia Type: general, regional ASA Status: 2          Anesthesia Type: No value filed.     Nora Phase I: Nora Score: 8    Nora Phase II: Nora Score: 10      Anesthesia Post Evaluation    Patient location during evaluation: PACU  Patient participation: complete - patient participated  Level of consciousness: awake  Airway patency: patent  Nausea & Vomiting: no nausea and no vomiting  Cardiovascular status: blood pressure returned to baseline  Respiratory status: acceptable  Hydration status: stable  Multimodal analgesia pain management approach

## 2022-07-13 NOTE — ANESTHESIA PRE PROCEDURE
Department of Anesthesiology  Preprocedure Note       Name:  Sisi Patient   Age:  63 y.o.  :  1960                                          MRN:  [id number]         Date:  2022      Surgeon: Brianna Gross):  Sarah Godoy MD    Procedure: Procedure(s):  LEFT SHOULDER ARTHROSCOPY ROTATOR CUFF REPAIR  BICEP TENODESIS    Medications prior to admission:   Prior to Admission medications    Medication Sig Start Date End Date Taking? Authorizing Provider   docusate sodium (COLACE) 100 MG capsule Take 1 capsule by mouth 2 times daily Post-op 22   Sarah Godoy MD   promethazine (PHENERGAN) 25 MG tablet Take 1 tablet by mouth every 6 hours as needed for Nausea Post-op 22  Sarah Godoy MD   oxyCODONE-acetaminophen (PERCOCET) 5-325 MG per tablet Take 1 tablet by mouth every 6 hours as needed for Pain for up to 5 days. Post-op 22  Sarah Godoy MD   traMADol (ULTRAM) 50 MG tablet TAKE 1 TABLET BY MOUTH EVERY 6 HOURS AS NEEDED FOR PAIN **REDUCE DOSES TAKEN AS PAIN BECOMES MANAGEABLE** 22  July Paz MD   lisinopril (PRINIVIL;ZESTRIL) 20 MG tablet Take 1 tablet by mouth daily 22   July Paz MD   triamterene-hydroCHLOROthiazide (MAXZIDE-25) 37.5-25 MG per tablet TAKE 1 TABLET BY MOUTH DAILY.  21   July Paz MD   nadolol (CORGARD) 80 MG tablet TAKE ONE TABLET BY MOUTH EVERY DAY 21   July Paz MD       Current medications:    Current Facility-Administered Medications   Medication Dose Route Frequency Provider Last Rate Last Admin    ceFAZolin (ANCEF) 2,000 mg in dextrose 5 % 50 mL IVPB (mini-bag)  2,000 mg IntraVENous Once Sarah Godoy MD        sodium chloride flush 0.9 % injection 5-40 mL  5-40 mL IntraVENous 2 times per day Steph Lieberman MD        sodium chloride flush 0.9 % injection 5-40 mL  5-40 mL IntraVENous PRN Steph Lieberman MD        0.9 % sodium chloride infusion   IntraVENous PRN Ivy Poole MD 5 mL/hr at 07/13/22 0722 New Bag at 07/13/22 7315       Allergies: Allergies   Allergen Reactions    Simvastatin      Abnormal lft's      Tetracyclines & Related Dermatitis     Turned red with topical TCN    Topamax [Topiramate]      Irritable, did not tolerate       Problem List:    Patient Active Problem List   Diagnosis Code    Medication overuse headache G44.40    Abnormal LFTs R94.5    Cervicalgia M54.2    Migraine aura without headache G43. 109    Essential hypertension I10    Intractable chronic migraine without aura and without status migrainosus G43.719    Intractable episodic tension-type headache G44.211       Past Medical History:        Diagnosis Date    Chronic headache     COVID-19 01/2021    Hyperlipidemia     Hypertension     Migraines     Renal calculi 04/2021    Calcium Oxalate stone by lab analysis       Past Surgical History:        Procedure Laterality Date    CARPAL TUNNEL RELEASE Right 1998    COLONOSCOPY  11/30/2016    [place]- normal, repeat 5 years    COLONOSCOPY  12/07/2021    [place]-no polyps, repeat in 5 years.        Social History:    Social History     Tobacco Use    Smoking status: Never Smoker    Smokeless tobacco: Never Used   Substance Use Topics    Alcohol use: Yes     Comment: rare                                Counseling given: Not Answered      Vital Signs (Current):   Vitals:    07/07/22 1443 07/13/22 0714   BP:  (!) 149/94   Pulse:  68   Resp:  14   Temp:  97.7 °F (36.5 °C)   TempSrc:  Temporal   SpO2:  99%   Weight: 194 lb (88 kg) 198 lb 1.7 oz (89.9 kg)   Height: 5' 10\" (1.778 m)                                               BP Readings from Last 3 Encounters:   07/13/22 (!) 149/94   06/27/22 124/80   03/31/22 118/72       NPO Status: Time of last liquid consumption: 2100                        Time of last solid consumption: 2100                        Date of last liquid consumption: 07/12/22                        Date of last solid food consumption: 07/12/22    BMI:   Wt Readings from Last 3 Encounters:   07/13/22 198 lb 1.7 oz (89.9 kg)   06/27/22 194 lb 12.8 oz (88.4 kg)   06/10/22 195 lb (88.5 kg)     Body mass index is 28.43 kg/m². CBC:   Lab Results   Component Value Date/Time    WBC 12.9 08/03/2018 09:33 AM    RBC 4.73 08/03/2018 09:33 AM    HGB 15.0 08/03/2018 09:33 AM    HCT 43.8 08/03/2018 09:33 AM    MCV 92.6 08/03/2018 09:33 AM    RDW 13.1 08/03/2018 09:33 AM     08/03/2018 09:33 AM       CMP:   Lab Results   Component Value Date/Time     07/05/2022 02:57 PM    K 4.1 07/05/2022 02:57 PM    K 4.5 08/03/2018 09:33 AM     07/05/2022 02:57 PM    CO2 26 07/05/2022 02:57 PM    BUN 19 07/05/2022 02:57 PM    CREATININE 1.0 07/05/2022 02:57 PM    GFRAA >60 07/05/2022 02:57 PM    GFRAA >60 05/13/2013 06:30 AM    AGRATIO 1.9 06/05/2020 02:49 PM    LABGLOM >60 07/05/2022 02:57 PM    GLUCOSE 99 07/05/2022 02:57 PM    PROT 6.7 06/05/2020 02:49 PM    PROT 7.2 12/31/2012 06:19 AM    CALCIUM 9.1 07/05/2022 02:57 PM    BILITOT 0.3 06/05/2020 02:49 PM    ALKPHOS 78 06/05/2020 02:49 PM    AST 28 06/05/2020 02:49 PM    ALT 37 06/05/2020 02:49 PM       POC Tests: No results for input(s): POCGLU, POCNA, POCK, POCCL, POCBUN, POCHEMO, POCHCT in the last 72 hours.     Coags: No results found for: PROTIME, INR, APTT    HCG (If Applicable): No results found for: PREGTESTUR, PREGSERUM, HCG, HCGQUANT     ABGs: No results found for: PHART, PO2ART, NVV6YRM, IIF1WUR, BEART, V2BQVBNW     Type & Screen (If Applicable):  No results found for: LABABO, LABRH    Drug/Infectious Status (If Applicable):  Lab Results   Component Value Date/Time    HEPCAB Non-Reactive (Negative) 12/31/2012 06:19 AM       COVID-19 Screening (If Applicable): No results found for: COVID19        Anesthesia Evaluation  Patient summary reviewed and Nursing notes reviewed no history of anesthetic complications:   Airway: Mallampati: II  TM distance: >3 FB   Neck ROM: full  Mouth opening: > = 3 FB   Dental: normal exam         Pulmonary: breath sounds clear to auscultation      (-) pneumonia, asthma, shortness of breath, recent URI and sleep apnea                           Cardiovascular:  Exercise tolerance: good (>4 METS),   (+) hypertension:,     (-) valvular problems/murmurs, past MI, CAD, CABG/stent, dysrhythmias,  angina,  DREW and no pulmonary hypertension    ECG reviewed  Rhythm: regular  Rate: normal                    Neuro/Psych:   (+) headaches: migraine headaches,    (-) seizures, TIA and CVA           GI/Hepatic/Renal:        (-) liver disease and no renal disease       Endo/Other:        (-) diabetes mellitus, hypothyroidism               Abdominal:             Vascular:     - PVD, DVT and PE. Other Findings:           Anesthesia Plan      general and regional     ASA 2     (GETA, PIV, multmodal analgesia, PACU for disposition. Discussed left interscalene nerve block for post-operative analgesia. Discussed risks of nerve injury, infection, and bleeding which the patient understood. I discussed with the patient the risks and benefits to general anesthesia including possible anesthetic complications but not limited to: PONV, damage to the airway and surrounding structures (teeth, lips, gums, tongue, etc.), adverse reactions to medications, cardiac complications (MI, CHF, arrhythmias, etc.), respiratory complications (post-op ventilation, respiratory failure, etc.), neurologic complications (nerve damage, stroke, seizure), the potential for conversion to a general anesthetic, and death. The patient was given the opportunity to ask questions and all questions were answered to the patient's satisfaction.  )      MIPS: Postoperative opioids intended and Prophylactic antiemetics administered. Anesthetic plan and risks discussed with patient. Plan discussed with CRNA.     Attending anesthesiologist reviewed and agrees with Preprocedure content      Post-op pain plan if not by surgeon: single peripheral nerve block        This pre-anesthesia assessment may be used as a history and physical.    DOS STAFF ADDENDUM:    Pt seen and examined, chart reviewed (including anesthesia, drug and allergy history). No interval changes to history and physical examination. Anesthetic plan, risks, benefits, alternatives, and personnel involved discussed with patient. Patient verbalized an understanding and agrees to proceed.       Bella Cockayne, MD  July 13, 2022  7:58 AM

## 2022-07-13 NOTE — BRIEF OP NOTE
Brief Postoperative Note      Patient: Shirley Quiroga  YOB: 1960  MRN: [id number]    Date of Procedure: 7/13/2022    Pre-Op Diagnosis: LEFT SHOULDER ROTATOR CUFF TEAR    Post-Op Diagnosis: Same       Procedure(s):  LEFT SHOULDER ARTHROSCOPY ROTATOR CUFF REPAIR  BICEP TENODESIS    Surgeon(s):  Anson Estrada MD    Assistant:  Surgical Assistant: Lora Riddle    Anesthesia: General    Estimated Blood Loss (mL): Minimal    Complications: None    Specimens:   * No specimens in log *    Implants:  Implant Name Type Inv. Item Serial No.  Lot No. LRB No. Used Action   ANCHOR SUT L14.7MM DIA5. 5MM BIOCOMPOSITE W/ 3 SZ 2 - [id number]  ANCHOR SUT L14.7MM DIA5. 5MM BIOCOMPOSITE W/ 3 SZ 2  [employer]- [id number] Left 1 Implanted   KIT IMPL SYS PROX TENODESIS W/ Linell Landau - [id number]  KIT IMPL SYS PROX TENODESIS W/ [employer]-WD [id number] Left 1 Implanted         Drains: * No LDAs found *    Findings: biceps tear, rct      Electronically signed by Easton García MD on 7/13/2022 at 9:44 AM

## 2022-07-13 NOTE — ADDENDUM NOTE
Addendum  created 07/13/22 1522 by Elham Escobar MD    Attestation recorded in [address] [place] [address], [facility] 97 filed

## 2022-07-13 NOTE — DISCHARGE INSTR - DIET
Good nutrition is important when healing from an illness, injury, or surgery. Follow any nutrition recommendations given to you during your hospital stay. If you were given an oral nutrition supplement while in the hospital, continue to take this supplement at home. You can take it with meals, in-between meals, and/or before bedtime. These supplements can be purchased at most local grocery stores, pharmacies, and chain [employer]-stores. If you have any questions about your diet or nutrition, call the hospital and ask for the dietitian.   Advance to regular diet as tolerated

## 2022-07-13 NOTE — OP NOTE
[address] Cathi Orellana [address]                                OPERATIVE REPORT    PATIENT NAME: Azam Dan                :        1960  MED REC NO:   [id number]                          ROOM:  ACCOUNT NO:   [de-identified]                           ADMIT DATE: 2022  PROVIDER:     Lin Espino MD    DATE OF PROCEDURE:  2022    PREOPERATIVE DIAGNOSES:  Left shoulder rotator cuff tear and biceps  tendon tear. POSTOPERATIVE DIAGNOSES:  Left shoulder rotator cuff tear and biceps  tendon tear. OPERATIONS PERFORMED:  1. Arthroscopy of the left shoulder with extensive intra-articular  debridement including biceps tenotomy. 2.  Arthroscopy of the left shoulder with rotator cuff repair. 3.  Open subpectoral biceps tenodesis. SURGEON:  Lin Espino MD    FIRST ASSISTANT:  Ms. Roseanna Bee    ANESTHESIA:  General plus interscalene nerve block. ANTIBIOTICS:  Ancef. ESTIMATED BLOOD LOSS:  Minimal.    COMPLICATIONS:  None apparent. INDICATIONS:  The patient suffered a traumatic injury to the left  rotator cuff and had significant shoulder disability. He was indicated  for surgery. Understanding the risks, benefits, and alternatives of the  operation, he was eager to proceed. OPERATIVE PROCEDURE:  The patient was identified in the preop holding  area. Informed consent had been obtained. The operative site of the  left shoulder was marked by me with my initials. He was given preop  interscalene nerve block, brought to the operating room, and placed  under general anesthesia. He was then placed in the beach-chair  position. TEDs and SCDs were placed. Bony prominences were padded. Examination of the left shoulder under anesthesia revealed stable,  complete motion.   After sterile prep and drape, a time-out confirmed  appropriate patient, positioning, operative site, and availability irrigated and closed with 0 Vicryl and 2-0 Vicryl  and the skin with a Monocryl. Arthroscopic portals were closed with  nylon. Steri-Strips, sterile dressing, and sling were applied, and the  patient was awoken and transported to Recovery without complication.         Caleb Montgomery MD    D: 07/13/2022 9:54:18       T: 07/13/2022 11:22:39     [email or web address]  Job#: [id number]     Doc#: [id number]    CC:

## 2022-07-13 NOTE — H&P
Aj Durand seen and examined. No interval changes. Risk, benefits, alternatives to surgery have been discussed at length. Recovery. Discussed at length this morning with the patient and his wife. Left shoulder marked.   Patient ready to proceed with left shoulder arthroscopy with rotator cuff repair and bicep tenodesis

## 2022-07-14 ENCOUNTER — OFFICE VISIT (OUTPATIENT)
Dept: ORTHOPEDIC SURGERY | Age: 62
End: 2022-07-14

## 2022-07-14 ENCOUNTER — HOSPITAL ENCOUNTER (OUTPATIENT)
Dept: PHYSICAL THERAPY | Age: 62
Setting detail: THERAPIES SERIES
Discharge: HOME | End: 2022-07-14
Payer: COMMERCIAL

## 2022-07-14 VITALS — HEIGHT: 70 IN | WEIGHT: 198 LBS | BODY MASS INDEX: 28.35 KG/M2

## 2022-07-14 DIAGNOSIS — S46.012D TRAUMATIC TEAR OF LEFT ROTATOR CUFF, UNSPECIFIED TEAR EXTENT, SUBSEQUENT ENCOUNTER: Primary | ICD-10-CM

## 2022-07-14 PROCEDURE — 97110 THERAPEUTIC EXERCISES: CPT | Performed by: PHYSICAL THERAPIST

## 2022-07-14 PROCEDURE — 97161 PT EVAL LOW COMPLEX 20 MIN: CPT | Performed by: PHYSICAL THERAPIST

## 2022-07-14 PROCEDURE — 99024 POSTOP FOLLOW-UP VISIT: CPT | Performed by: ORTHOPAEDIC SURGERY

## 2022-07-14 PROCEDURE — 97530 THERAPEUTIC ACTIVITIES: CPT | Performed by: PHYSICAL THERAPIST

## 2022-07-14 NOTE — FLOWSHEET NOTE
[address] [facility], [place]                                                         Physical Therapy Daily Treatment Note  Date:  2022    Patient Name:  Azra Rabago    :  1960  MRN: [id number]    Medical/Treatment Diagnosis Information:  Diagnosis: s/p L shoulder rotator cuff repair and bicep tenodesis on 22; S46.012D (ICD-10-CM) - Traumatic tear of left rotator cuff, unspecified tear extent, subsequent encounter  · Treatment Diagnosis: M25.512 (L) shoulder pain; M62.81 muscle weakness  Insurance/Certification information:  PT Insurance Information: [employer]- 50 visits, no auth, $30 copay  Physician Information:   Dr. Faythe Lesch  Has the plan of care been signed (Y/N):        []  Yes  [x]  No     Date of Patient follow up with Physician: 1 week sutures      Is this a Progress Report:     []  Yes  [x]  No        If Yes:  Date Range for reporting period:  Beginning- 2022   Ending    Progress report will be due (10 Rx or 30 days whichever is less): 10 visits or        Recertification will be due (POC Duration  / 90 days whichever is less): as above        Visit # Insurance Allowable Auth Required   1 50 []  Yes [x]  No        Functional Scale: FOTO- 4    Date assessed:  2022      Latex Allergy:  [x]NO      []YES  Preferred Language for Healthcare:   [x]English       []other:      Pain level:  2/10  on 2022    SUBJECTIVE:  See eval    OBJECTIVE: See eval   Observation:    Test measurements:      CERV ROM       Cervical Flexion       Cervical Extension       Cervical SB       Cervical rotation                     ROM PROM AROM  Comment     L R L R     Flexion 65 table slides           Abduction             ER at side             ER at 90 abd             BB IR             IR at 90 abd             Other             Other                    Strength- deferred due to recent surgery L R Comment Flexion         Abduction         ER         IR         Supraspinatus         Upper Trap         Lower Trap         Mid Trap         Rhomboids         Biceps         Triceps         Horizontal Abduction         Horizontal Adduction         Lats            Orthopedic Special Tests: deferred  Special Tests Left Right   Apley Scratch IR:  ER:   Cross body: IR:  ER:  Cross Body:   Neer's       Full Can       Empty Can       Sally Winter       Nerve Tension Testing       Speed's       Osuna's        Spurling's       Repeated Scaption                        Reflexes/Sensation (myotomes/dermatomes):  able to sense light touch in hand and arm     Joint mobility: n/t              []?Normal                       []?Hypo              []?Hyper     Palpation: n/t     Functional Mobility/Transfers: Indep     Posture: corrected fit of sling in session     Bandages/Dressings/Incisions:  Bandages removed and recovered with Tegaderm by MD.      Gait: (include devices/WB status) Indep    RESTRICTIONS/PRECAUTIONS: HTN, HLD; he also has some back pain also. Carpal tunnel L UE.      Exercises/Interventions:     Exercise/Equipment Resistance/Repetitions Other comments   Aerobic Conditioning     Aerodyne          Stretching/PROM     ER supine at side     Cane ER- seated at side     Wand flex     Towel press up     Passive shoulder flex with opposite UE assist     Ball on wall for shoulder flex     Elbow PROM 10 x 10 secs    Side-lying flex     Table Slides 10 x 10 secs Limit to 90 in HEP   Wall slides      UE [place]     Pulleys     Pendulum hold/forward bow 10 x 10 secs    BB IR     SL IR     Pec doorway stretch     CBA stretch     UT stretch     LS stretch     Isometrics     Retraction 2 x 10 in sling     X 30     Weight shift     Flexion     Abduction     External Rotation     Internal Rotation     Biceps     Triceps          PRE's     Flexion     Abduction     External Rotation     Internal Rotation     Shrugs 2 x 10 in sling Prone EXT     Reverse Flys- prone Ts     Prone Ys     Serratus     Horizontal Abd with ER     Biceps     Triceps     Retraction     External rotation in abduction (hitch hiker)     Cable Column/Theraband     External rotation to upper cut isometric     External Rotation     Internal Rotation     Shrugs     Lats     Ext     Flex     Scapular Retraction     BIC     TRIC     PNF - D2 flex     PNF- D2 extn     [place] flexion/deltoid retraining     Dynamic Stability     Ball on wall     Push ups on wall     Push ups on ball on wall     90/90 ball taps     Horizontal abd ball taps     120 overhead ball taps (Y position)     Plyoback          Manual treatment:                           Patient education: Pt was educated on PT diagnosis, prognosis, and plan of care. Pt was educated on the use of ice throughout the day. Pt was educated on signs/symptoms of infection and to call with any questions or concerns. Pt educated on post-op dressings, donning/doffing sling, dressing, precautions and restrictions. Therapeutic Exercise and NMR EXR  [x] (36370) Provided verbal/tactile cueing for activities related to strengthening, flexibility, endurance, ROM  for improvements in scapular, scapulothoracic and UE control with self care, reaching, carrying, lifting, house/yardwork, driving/computer work.    [] (07699) Provided verbal/tactile cueing for activities related to improving balance, coordination, kinesthetic sense, posture, motor skill, proprioception  to assist with  scapular, scapulothoracic and UE control with self care, reaching, carrying, lifting, house/yardwork, driving/computer work.     Therapeutic Activities:    [x] (09465 or 13926) Provided verbal/tactile cueing for activities related to improving balance, coordination, kinesthetic sense, posture, motor skill, proprioception and motor activation to allow for proper function of scapular, scapulothoracic and UE control with self care, carrying, lifting, driving/computer work. Home Exercise Program:    [x] (07307) Reviewed/Progressed HEP activities related to strengthening, flexibility, endurance, ROM of scapular, scapulothoracic and UE control with self care, reaching, carrying, lifting, house/yardwork, driving/computer work  [] (72713) Reviewed/Progressed HEP activities related to improving balance, coordination, kinesthetic sense, posture, motor skill, proprioception of scapular, scapulothoracic and UE control with self care, reaching, carrying, lifting, house/yardwork, driving/computer work      Manual Treatments:  PROM / STM / Oscillations-Mobs:  G-I, II, III, IV (PA's, Inf., Post.)  [] (23812) Provided manual therapy to mobilize soft tissue/joints of cervical/CT, scapular GHJ and UE for the purpose of modulating pain, promoting relaxation,  increasing ROM, reducing/eliminating soft tissue swelling/inflammation/restriction, improving soft tissue extensibility and allowing for proper ROM for normal function with self care, reaching, carrying, lifting, house/yardwork, driving/computer work    Modalities:  Ice x 15 mins - education throughout icing   Charges:  Timed Code Treatment Minutes: 35   Total Treatment Minutes: 53     [x] EVAL (LOW) 18120   [] EVAL (MOD) 77667   [] EVAL (HIGH) 38409   [] RE-EVAL   [x] DJ(53699) x  1   [] IONTO  [] NMR (97883) x     [] VASO  [] Manual (27354) x      [] Other:  [x] TA x1      [] Mech Traction (81767)  [] ES(attended) (48361)      [] ES (un) (41919):       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 Independent in HEP and progression per patient tolerance, in order to prevent re-injury. []? Progressing: []? Met: []? Not Met: []? Adjusted   2. Patient will have a decrease in pain to facilitate improvement in movement, function, and ADLs as indicated by Functional Deficits. []? Progressing: []? Met: []?  Not Met: []? Adjusted     Long Term Goals: To be achieved in: 5-6 months  1. Functional index score of 65% or more for the FOTO to assist with reaching prior level of function. []? Progressing: []? Met: []? Not Met: []? Adjusted  2. Patient will demonstrate increased L shoulder AROM to  160 flex, 170 abd, and ER at 90 to 90 degrees to allow for proper joint functioning as indicated by patients Functional Deficits. []? Progressing: []? Met: []? Not Met: []? Adjusted  3. Patient will demonstrate an increase in  L shoulder strength to 4/5 flex, abd, ER in UE to allow for proper functional mobility as indicated by patients Functional Deficits. []? Progressing: []? Met: []? Not Met: []? Adjusted  4. Patient will return to ADLs above shoulder height without increased symptoms or restriction. []? Progressing: []? Met: []? Not Met: []? Adjusted  5. 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 []? Progressing: []? Met: []? Not Met: []? Adjusted         Overall Progression Towards Functional goals/ Treatment Progress Update:  [] Patient is progressing as expected towards functional goals listed. [] Progression is slowed due to complexities/Impairments listed. [] Progression has been slowed due to co-morbidities.   [x] Plan just implemented, too soon to assess goals progression <30days   [] Goals require adjustment due to lack of progress  [] Patient is not progressing as expected and requires additional follow up with physician  [] Other    Prognosis for POC: [x] Good [] Fair  [] Poor      Patient requires continued skilled intervention: [x] Yes  [] No      ASSESSMENT:  See eval    Treatment/Activity Tolerance:  [x] Patient tolerated treatment well [] Patient limited by fatique  [] Patient limited by pain  [] Patient limited by other medical complications  [] Other:       Return to Play: (if applicable)   []  Stage 1: Intro to Strength   []  Stage 2: Return to Run and Strength   []  Stage 3: Return to Jump and Strength   []  Stage 4: Dynamic Strength and Agility   []  Stage 5: Sport Specific Training     []  Ready to Return to Play, Meets All Above Stages   []  Not Ready for Return to Sports   Comments:            Prognosis: [x] Good [] Fair  [] Poor    Patient Requires Follow-up: [x] Yes  [] No    PLAN: See eval; consider table slides scap, wrist exercises  [] Continue per plan of care [] Alter current plan (see comments above)  [x] Plan of care initiated [] Hold pending MD visit [] Discharge    Note: If patient does not return for scheduled/ recommended follow up visits, this note will serve as a discharge from care along with most recent update on progress.      Electronically signed by: Sierra Marte, PT PT, DPT

## 2022-07-14 NOTE — PLAN OF CARE
[address] [facility],[address], [address] [facility], [address] [place] [address]  Phone: [phone]   Fax: [phone]          Daniel Harrison    Dear  Dr. Sloane Mark,    We had the pleasure of evaluating the following patient for physical therapy services at [address] [place] [address]. A summary of our findings can be found in the initial assessment below. This includes our plan of care. If you have any questions or concerns regarding these findings, please do not hesitate to contact me at the office phone number checked above. Thank you for the referral.       Physician Signature:_______________________________Date:__________________  By signing above (or electronic signature), therapists plan is approved by physician      Patient: Shirley Quiroga   : 1960   MRN: [id number]  Referring Physician:  Dr. Sloane Mark      Evaluation Date: 2022      Medical Diagnosis Information:  Diagnosis: s/p L shoulder rotator cuff repair and bicep tenodesis on 22; S46.012D (ICD-10-CM) - Traumatic tear of left rotator cuff, unspecified tear extent, subsequent encounter   Treatment Diagnosis: M25.512 (L) shoulder pain; M62.81 muscle weakness                                           Precautions/ Contra-indications: HTN, HLD; he also has some back pain also. Carpal tunnel L UE. Per protocol. NWB    C-SSRS Triggered by Intake questionnaire (Past 2 wk assessment):   [x] No, Questionnaire did not trigger screening.   [] Yes, Patient intake triggered further evaluation      [] C-SSRS Screening completed  [] PCP notified via Plan of Care  [] Emergency services notified     Latex Allergy:  [x]NO      []YES  Preferred Language for Healthcare:   [x]English       []other:    SUBJECTIVE: Patient stated complaint:Pt presents one day s/p L shoulder rotator cuff repair and bicep tenodesis. Pts L shoulder has been bothering him for years.  Prior to surgery, he would have trouble sleeping at night after an active day. He notes before surgery he could not use a pitch fork. He reports the final straw was an incident with his horse when he was trying to put a strap on horse and horse pulled this and his arm causing more pain in shoulder. Pt is R handed. Relevant Medical History:HTN, HLD; he also has some back pain also. Carpal tunnel L UE. Functional Disability Index: FOTO- 4  Relevant Medication: Advil this morning and today. 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 Has to use hands to test things out though. Pt will be off work for at least 12 weeks. 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 Lots of house projects.      Living Status/Prior Level of Function: Independent with ADLs and IADLs, with pain on /off for years      OBJECTIVE:     CERV ROM     Cervical Flexion     Cervical Extension     Cervical SB     Cervical rotation         ROM PROM AROM  Comment    L R L R    Flexion 65 table slides       Abduction        ER at side        ER at 90 abd        BB IR        IR at 90 abd        Other        Other             Strength- deferred due to recent surgery L R Comment   Flexion      Abduction      ER      IR      Supraspinatus      Upper Trap      Lower Trap      Mid Trap      Rhomboids      Biceps      Triceps      Horizontal Abduction      Horizontal Adduction      Lats        Orthopedic Special Tests: deferred  Special Tests Left Right   Apley Scratch IR:  ER:   Cross body: IR:  ER:  Cross Body:   Neer's     Full Can     Empty Can     Annette Heimlich     Nerve Tension Testing     Speed's     Osuna's      Spurling's     Repeated Scaption                Reflexes/Sensation (myotomes/dermatomes):  able to sense light touch in hand and arm    Joint mobility: n/t   []Normal    []Hypo   []Hyper    Palpation: n/t    Functional Mobility/Transfers: Indep    Posture: corrected fit of sling in session    Bandages/Dressings/Incisions:  Bandages removed and recovered with Tegaderm by MD.     Gait: (include devices/WB status) Indep                            Review Of Systems (ROS):  [x]Performed Review of systems (Integumentary, CardioPulmonary, Neurological) by intake and observation. Intake form has been scanned into medical record. Patient has been instructed to contact their primary care physician regarding ROS issues if not already being addressed at this time.       Co-morbidities/Complexities (which will affect course of rehabilitation):   []None           Arthritic conditions   []Rheumatoid arthritis (M05.9)  []Osteoarthritis (M19.91)   Cardiovascular conditions   [x]Hypertension (I10)  [x]Hyperlipidemia (E78.5)  []Angina pectoris (I20)  []Atherosclerosis (I70)   Musculoskeletal conditions   []Disc pathology   []Congenital spine pathologies   []Prior surgical intervention  []Osteoporosis (M81.8)  []Osteopenia (M85.8)   Endocrine conditions   []Hypothyroid (E03.9)  []Hyperthyroid Gastrointestinal conditions   []Constipation (W78.89)   Metabolic conditions   []Morbid obesity (E66.01)  []Diabetes type 1(E10.65) or 2 (E11.65)   []Neuropathy (G60.9)     Pulmonary conditions   []Asthma (J45)  []Coughing   []COPD (J44.9)   Psychological Disorders  []Anxiety (F41.9)  []Depression (F32.9)   []Other:   []Other:          Barriers to/and or personal factors that will affect rehab potential:              []Age  []Sex              [x]Motivation/Lack of Motivation - 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 [] Falls Risk assessed and Patient requires intervention due to being higher risk   TUG score (>12s at risk):     [] Falls education provided, including         ASSESSMENT: Pt is a 63y.o. year old male s/pL shoulder rotator cuff repair and bicep tenodesis on 7/13/22. Pt presents with decreased ROM; decreased strength; increased pain; decreased flexibility; poor posture; and decreased functional mobility and ADLs. Pt will benefit from skilled physical therapy services to address above limitations through strengthening, stretching, ROM, education on post op restrictions and precautions, modalities as needed for pain control and swelling, instruction on HEP, and education on return to functional mobility.      Functional Impairments   []Noted spinal or UE joint hypomobility   []Noted spinal or UE joint hypermobility   [x]Decreased UE functional ROM   [x]Decreased UE functional strength   []Abnormal reflexes/sensation/myotomal/dermatomal deficits   [x]Decreased RC/scapular/core strength and neuromuscular control   []other:      Functional Activity Limitations (from functional questionnaire and intake)   [x]Reduced ability to tolerate prolonged functional positions   [x]Reduced ability or difficulty with changes of positions or transfers between positions   [x]Reduced ability to maintain good posture and demonstrate good body mechanics with sitting, bending, and lifting   [x] Reduced ability or tolerance with driving and/or computer work   [x]Reduced ability to sleep   [x]Reduced ability to perform lifting, reaching, carrying tasks   [x]Reduced ability to tolerate impact through UE   [x]Reduced ability to reach behind back   [x]Reduced ability to  or hold objects   [x]Reduced ability to throw or toss an object   []other:    Participation Restrictions   [x]Reduced participation in self care activities   [x]Reduced participation in home management activities   [x]Reduced participation in work activities   [x]Reduced participation in social activities. [x]Reduced participation in sport/recreation activities. Classification:   [x]Signs/symptoms consistent with post-surgical status including decreased ROM, strength and function. []Signs/symptoms consistent with joint sprain/strain   []Signs/symptoms consistent with shoulder impingement   []Signs/symptoms consistent with shoulder/elbow/wrist tendinopathy   []Signs/symptoms consistent with Rotator cuff tear   []Signs/symptoms consistent with labral tear   []Signs/symptoms consistent with postural dysfunction    []Signs/symptoms consistent with Glenohumeral IR Deficit - <45 degrees   []Signs/symptoms consistent with facet dysfunction of cervical/thoracic spine    []Signs/symptoms consistent with pathology which may benefit from Dry needling     []other:     Prognosis/Rehab Potential:      []Excellent   [x]Good    []Fair   []Poor    Tolerance of evaluation/treatment:    []Excellent   [x]Good    []Fair   []Poor    PLAN:  Frequency/Duration:  1-2 days per week for 5-6 months:  INTERVENTIONS:  [x] Therapeutic exercise including: strength training, ROM, for Upper extremity and core   [x]  NMR activation and proprioception for UE, scap and Core   [x] Manual therapy as indicated for shoulder, scapula and spine to include: Dry Needling/IASTM, STM, PROM, Gr I-IV mobilizations, manipulation.    [x] Modalities as needed that may include: thermal agents, E-stim, Biofeedback, US, iontophoresis as indicated  [x] Patient education on joint protection, postural re-education, activity modification, progression of HEP. HEP instruction: Pt was instructed in, and safely and correctly demonstrated home exercise program. Patient verbalized understanding of proper frequency of exercises. Copy of exercises was scanned into patient chart and can be fount in the media file. Access Code: [email or web address]  URL: 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 Independent in HEP and progression per patient tolerance, in order to prevent re-injury. [] Progressing: [] Met: [] Not Met: [] Adjusted   2. Patient will have a decrease in pain to facilitate improvement in movement, function, and ADLs as indicated by Functional Deficits. [] Progressing: [] Met: [] Not Met: [] Adjusted    Long Term Goals: To be achieved in: 5-6 months  1. Functional index score of 65% or more for the FOTO to assist with reaching prior level of function. [] Progressing: [] Met: [] Not Met: [] Adjusted  2. Patient will demonstrate increased L shoulder AROM to  160 flex, 170 abd, and ER at 90 to 90 degrees to allow for proper joint functioning as indicated by patients Functional Deficits. [] Progressing: [] Met: [] Not Met: [] Adjusted  3. Patient will demonstrate an increase in  L shoulder strength to 4/5 flex, abd, ER in UE to allow for proper functional mobility as indicated by patients Functional Deficits. [] Progressing: [] Met: [] Not Met: [] Adjusted  4. Patient will return to ADLs above shoulder height without increased symptoms or restriction. [] Progressing: [] Met: [] Not Met: [] Adjusted  5. 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 [] Progressing: [] Met: [] Not Met: [] Adjusted        Physical Therapy Evaluation Complexity Justification  [x] A history of present problem with:  [] no personal factors and/or comorbidities that impact the plan of care;  [x]1-2 personal factors and/or comorbidities that impact the plan of care  []3 personal factors and/or comorbidities that impact the plan of care  [x] An examination of body systems using standardized tests and measures addressing any of the following: body structures and functions (impairments), activity limitations, and/or participation restrictions;:  [] a total of 1-2 or more elements   [] a total of 3 or more elements   [x] a total of 4 or more elements   [x] A clinical presentation with:  [x] stable and/or uncomplicated characteristics   [] evolving clinical presentation with changing characteristics  [] unstable and unpredictable characteristics;   [x] Clinical decision making of [x] low, [] moderate, [] high complexity using standardized patient assessment instrument and/or measurable assessment of functional outcome.     [x] EVAL (LOW) 54532 (typically 20 minutes face-to-face)  [] EVAL (MOD) 47687 (typically 30 minutes face-to-face)  [] EVAL (HIGH) 36001 (typically 45 minutes face-to-face)  [] RE-EVAL 69683      Electronically signed by:  Emmanuel Donnelly, PT, PT, DPT

## 2022-07-14 NOTE — PROGRESS NOTES
Azra Rabago returns today 1 day status post left shoulder rotator cuff repair and bicep tenodesis. He is doing well. Pain is well managed. Reviewed his arthroscopic photos with him. Neurologic and vascular exams of left upper extremity are normal.      Today, incisions look good without infection. Changes bandages in place new Tegaderm. I reviewed his arthroscopic photos with him.   He will continue with therapy and follow-up with me in a week for suture removal.

## 2022-07-21 ENCOUNTER — OFFICE VISIT (OUTPATIENT)
Dept: ORTHOPEDIC SURGERY | Age: 62
End: 2022-07-21

## 2022-07-21 ENCOUNTER — HOSPITAL ENCOUNTER (OUTPATIENT)
Dept: PHYSICAL THERAPY | Age: 62
Setting detail: THERAPIES SERIES
Discharge: HOME | End: 2022-07-21
Payer: COMMERCIAL

## 2022-07-21 VITALS — WEIGHT: 198 LBS | BODY MASS INDEX: 28.35 KG/M2 | HEIGHT: 70 IN | RESPIRATION RATE: 12 BRPM

## 2022-07-21 DIAGNOSIS — M25.512 ACUTE PAIN OF LEFT SHOULDER: ICD-10-CM

## 2022-07-21 DIAGNOSIS — S46.012D TRAUMATIC TEAR OF LEFT ROTATOR CUFF, UNSPECIFIED TEAR EXTENT, SUBSEQUENT ENCOUNTER: Primary | ICD-10-CM

## 2022-07-21 PROCEDURE — 99024 POSTOP FOLLOW-UP VISIT: CPT | Performed by: ORTHOPAEDIC SURGERY

## 2022-07-21 PROCEDURE — 97530 THERAPEUTIC ACTIVITIES: CPT | Performed by: PHYSICAL THERAPIST

## 2022-07-21 PROCEDURE — 97110 THERAPEUTIC EXERCISES: CPT | Performed by: PHYSICAL THERAPIST

## 2022-07-21 RX ORDER — TRAMADOL HYDROCHLORIDE 50 MG/1
TABLET ORAL
COMMUNITY
Start: 2022-07-13 | End: 2022-08-12

## 2022-07-21 NOTE — PROGRESS NOTES
Rosette Avila returns today 1 week status post left shoulder arthroscopy with rotator cuff repair and bicep tenodesis. He is doing very well and reports only minimal discomfort. Today, left shoulder incisions look great without infection. I removed his sutures and placed new Steri-Strips. Neurologic and vascular exams left upper extremity are normal.    He will continue with rehab and his sling. Follow-up with me in 5 weeks.

## 2022-07-21 NOTE — FLOWSHEET NOTE
[address] [facility], [place]                                                         Physical Therapy Daily Treatment Note  Date:  2022    Patient Name:  Evonne Saldaña    :  1960  MRN: [id number]    Medical/Treatment Diagnosis Information:  Diagnosis: s/p L shoulder rotator cuff repair and bicep tenodesis on 22; S46.012D (ICD-10-CM) - Traumatic tear of left rotator cuff, unspecified tear extent, subsequent encounter  Treatment Diagnosis: M25.512 (L) shoulder pain; M62.81 muscle weakness  Insurance/Certification information:  PT Insurance Information: [employer]- 50 visits, no auth, $30 copay  Physician Information:   Dr. Cassie Foster  Has the plan of care been signed (Y/N):        [x]  Yes  []  No     Date of Patient follow up with Physician: 1 week sutures      Is this a Progress Report:     []  Yes  [x]  No        If Yes:  Date Range for reporting period:  Beginning- 2022   Ending    Progress report will be due (10 Rx or 30 days whichever is less): 10 visits or        Recertification will be due (POC Duration  / 90 days whichever is less): as above        Visit # Insurance Allowable Auth Required   2 50 []  Yes [x]  No        Functional Scale: FOTO- 4    Date assessed:  2022      Latex Allergy:  [x]NO      []YES  Preferred Language for Healthcare:   [x]English       []other:      Pain level:  0/10  on 2022    SUBJECTIVE:  Pt reports that shoulder is feeling good. Sling gets hot. He reports his heels were hurting laying on his back but put more padding underneath at night and this has helped. His L lateral and anterior shin has also been painful at night. It feels like his leg is broken in this area. Hurts just with leg straight at night and with WBing. Reports he has had this in past but has been a while. It was after long car ride. He did mention to PCP but did not find cause. OBJECTIVE: See eval  Observation: 7/21:mild soreness lateral shin with manual calf stretch. Non-tender to palpation or resisted MMT at ankle. Mild discomfort with full knee flex. No redness, warmth or swelling noted. Test measurements:      CERV ROM       Cervical Flexion       Cervical Extension       Cervical SB       Cervical rotation                     ROM PROM AROM  Comment     L R L R     Flexion 100 table slides           Abduction             ER at side             ER at 90 abd             BB IR             IR at 90 abd             Other             Other                    Strength- deferred due to recent surgery L R Comment   Flexion         Abduction         ER         IR         Supraspinatus         Upper Trap         Lower Trap         Mid Trap         Rhomboids         Biceps         Triceps         Horizontal Abduction         Horizontal Adduction         Lats            Orthopedic Special Tests: deferred  Special Tests Left Right   Apley Scratch IR:  ER:   Cross body: IR:  ER:  Cross Body:   Neer's       Full Can       Empty Can       Naima Memos       Nerve Tension Testing       Speed's       Osuna's        Spurling's       Repeated Scaption                        Reflexes/Sensation (myotomes/dermatomes):  able to sense light touch in hand and arm     Joint mobility: n/t              []Normal                       []Hypo              []Hyper     Palpation: n/t     Functional Mobility/Transfers: Indep     Posture: corrected fit of sling in session     Bandages/Dressings/Incisions:  sutures removed by MD today recovered with tegaderm. Gait: (include devices/WB status) Indep    RESTRICTIONS/PRECAUTIONS: HTN, HLD; he also has some back pain also. Carpal tunnel L UE.      Exercises/Interventions:     Exercise/Equipment Resistance/Repetitions Other comments   Aerobic Conditioning     Aerodyne          Stretching/PROM     ER supine at side     Cane ER- seated at side     Wand flex Towel press up     Passive shoulder flex with opposite UE assist     Ball on wall for shoulder flex     Elbow PROM 10 x 10 secs    Side-lying flex     Table Slides 10 x 10 secs flex and scap Limit to 90 in HEP   Wall slides      UE [place]     Pulleys     Pendulum hold/forward bow 10 x 10 secs    BB IR     SL IR     Pec doorway stretch     CBA stretch     UT stretch Shown for HEP    LS stretch Shown for HEP    Isometrics     Retraction 10 x 10 secs         Weight shift     Flexion     Abduction     External Rotation     Internal Rotation     Biceps     Triceps          PRE's     Flexion     Abduction     External Rotation     Internal Rotation     Shrugs 3 x 10     Prone EXT     Reverse Flys- prone Ts     Prone Ys     Serratus     Horizontal Abd with ER     Biceps     Triceps     Retraction     Wrist flex/extn  3 x 10 1 lb each    External rotation in abduction (hitch hiker)     Cable Column/Theraband     External rotation to upper cut isometric     External Rotation     Internal Rotation     Shrugs     Lats     Ext     Flex     Scapular Retraction     BIC     TRIC     PNF - D2 flex     PNF- D2 extn     [place] flexion/deltoid retraining     Dynamic Stability     Ball on wall     Push ups on wall     Push ups on ball on wall     90/90 ball taps     Horizontal abd ball taps     120 overhead ball taps (Y position)     Plyoback          Manual treatment:                           Patient education: Pt was educated on PT diagnosis, prognosis, and plan of care. Pt was educated on the use of ice throughout the day. Pt was educated on signs/symptoms of infection and to call with any questions or concerns. Pt educated on post-op dressings, donning/doffing sling, dressing, precautions and restrictions.      Therapeutic Exercise and NMR EXR  [x] (08502) Provided verbal/tactile cueing for activities related to strengthening, flexibility, endurance, ROM  for improvements in scapular, scapulothoracic and UE control with self care, reaching, carrying, lifting, house/yardwork, driving/computer work.    [] (23562) Provided verbal/tactile cueing for activities related to improving balance, coordination, kinesthetic sense, posture, motor skill, proprioception  to assist with  scapular, scapulothoracic and UE control with self care, reaching, carrying, lifting, house/yardwork, driving/computer work. Therapeutic Activities:    [x] (02111 or 78102) Provided verbal/tactile cueing for activities related to improving balance, coordination, kinesthetic sense, posture, motor skill, proprioception and motor activation to allow for proper function of scapular, scapulothoracic and UE control with self care, carrying, lifting, driving/computer work. Home Exercise Program:    [x] (29200) Reviewed/Progressed HEP activities related to strengthening, flexibility, endurance, ROM of scapular, scapulothoracic and UE control with self care, reaching, carrying, lifting, house/yardwork, driving/computer work  [] (37015) Reviewed/Progressed HEP activities related to improving balance, coordination, kinesthetic sense, posture, motor skill, proprioception of scapular, scapulothoracic and UE control with self care, reaching, carrying, lifting, house/yardwork, driving/computer work    Access Code: [email or web address]  URL: [email or web address]  Date: 07/21/2022  Prepared by: Kika Francisco    Exercises  Seated Elbow Flexion AAROM - 2 x daily - 7 x weekly - 1 sets - 10 reps - 10 hold  Circular Shoulder Pendulum with Table Support - 2 x daily - 7 x weekly - 1 sets - 10 reps - 10 hold  Seated Shoulder Flexion Towel Slide at Table Top - 2 x daily - 7 x weekly - 1 sets - 10 reps - 10 hold  Seated Shoulder Scaption Towel Slide at Table Top - 2 x daily - 7 x weekly - 10 reps - 1 sets - 10 hold  Seated Scapular Retraction - 2 x daily - 7 x weekly - 1 sets - 10 reps - 10 hold  Seated Shoulder Shrugs - 2 x daily - 7 x weekly - 3 sets - 10 reps  Seated Gripping Towel - 5 x daily - 7 x weekly - 3 sets - 10 reps  Seated Wrist Flexion with Dumbbell - 1 x daily - 7 x weekly - 3 sets - 10 reps  Seated Wrist Extension with Dumbbell - 1 x daily - 7 x weekly - 3 sets - 10 reps    Manual Treatments:  PROM / STM / Oscillations-Mobs:  G-I, II, III, IV (PA's, Inf., Post.)  [] (94350) Provided manual therapy to mobilize soft tissue/joints of cervical/CT, scapular GHJ and UE for the purpose of modulating pain, promoting relaxation,  increasing ROM, reducing/eliminating soft tissue swelling/inflammation/restriction, improving soft tissue extensibility and allowing for proper ROM for normal function with self care, reaching, carrying, lifting, house/yardwork, driving/computer work    Modalities: will ice on way home  Charges:  Timed Code Treatment Minutes: 28   Total Treatment Minutes: 32     [] EVAL (LOW) 19653   [] EVAL (MOD) 16306   [] EVAL (HIGH) 33052   [] RE-EVAL   [x] QY(95937) x  1   [] IONTO  [] NMR (58598) x     [] VASO  [] Manual (29558) x      [] Other:  [x] TA x1      [] Mech Traction (19450)  [] ES(attended) (35676)      [] ES (un) (31553):       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 Independent in HEP and progression per patient tolerance, in order to prevent re-injury. [] Progressing: [] Met: [] Not Met: [] Adjusted   2. Patient will have a decrease in pain to facilitate improvement in movement, function, and ADLs as indicated by Functional Deficits. [] Progressing: [] Met: [] Not Met: [] Adjusted     Long Term Goals: To be achieved in: 5-6 months  1. Functional index score of 65% or more for the FOTO to assist with reaching prior level of function. [] Progressing: [] Met: [] Not Met: [] Adjusted  2. Patient will demonstrate increased L shoulder AROM to  160 flex, 170 abd, and ER at 90 to 90 degrees to allow for proper joint functioning as indicated by patients Functional Deficits. [] Progressing: [] Met: [] Not Met: [] Adjusted  3. Patient will demonstrate an increase in  L shoulder strength to 4/5 flex, abd, ER in UE to allow for proper functional mobility as indicated by patients Functional Deficits. [] Progressing: [] Met: [] Not Met: [] Adjusted  4. Patient will return to ADLs above shoulder height without increased symptoms or restriction. [] Progressing: [] Met: [] Not Met: [] Adjusted  5. 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 [] Progressing: [] Met: [] Not Met: [] Adjusted         Overall Progression Towards Functional goals/ Treatment Progress Update:  [] Patient is progressing as expected towards functional goals listed. [] Progression is slowed due to complexities/Impairments listed. [] Progression has been slowed due to co-morbidities. [x] Plan just implemented, too soon to assess goals progression <30days   [] Goals require adjustment due to lack of progress  [] Patient is not progressing as expected and requires additional follow up with physician  [] Other    Prognosis for POC: [x] Good [] Fair  [] Poor      Patient requires continued skilled intervention: [x] Yes  [] No      ASSESSMENT:  See eval    Treatment/Activity Tolerance:  [x] Patient tolerated treatment well [] Patient limited by fatique  [] Patient limited by pain  [] Patient limited by other medical complications  [x] Other: pt educated to stretch calf with standing running stretch, continue ankle pumps, and to try some pillows under legs at night to spend some time with knees flexed to help with his L lower leg pain. Educated to try to hold wrist in neutral due to his carpal tunnel symptoms. He tolerated session well today. Did have to educate pt not to go past 100 on table slides today. He was given updated HEP and will follow up in 2 weeks to check on status. May contact PT visits as needed.        Return to Play: (if applicable)   []  Stage 1: Intro to Strength   []  Stage 2: Return to Run and Strength   []  Stage 3: Return to Jump and Strength   []  Stage 4: Dynamic Strength and Agility   []  Stage 5: Sport Specific Training     []  Ready to Return to Play, Meets All Above Stages   []  Not Ready for Return to Sports   Comments:            Prognosis: [x] Good [] Fair  [] Poor    Patient Requires Follow-up: [x] Yes  [] No    PLAN: See eval; consider ER at side, elbow AROM  [x] Continue per plan of care [] Alter current plan (see comments above)  [] Plan of care initiated [] Hold pending MD visit [] Discharge    Note: If patient does not return for scheduled/ recommended follow up visits, this note will serve as a discharge from care along with most recent update on progress.      Electronically signed by: Yue Chavez, PT, DPT

## 2022-08-05 ENCOUNTER — HOSPITAL ENCOUNTER (OUTPATIENT)
Dept: PHYSICAL THERAPY | Age: 62
Setting detail: THERAPIES SERIES
Discharge: HOME | End: 2022-08-05
Payer: COMMERCIAL

## 2022-08-05 PROCEDURE — 97110 THERAPEUTIC EXERCISES: CPT | Performed by: PHYSICAL THERAPIST

## 2022-08-05 PROCEDURE — 97530 THERAPEUTIC ACTIVITIES: CPT | Performed by: PHYSICAL THERAPIST

## 2022-08-05 NOTE — FLOWSHEET NOTE
[address] [facility], [place]                                                         Physical Therapy Daily Treatment Note  Date:  2022    Patient Name:  Rolla Cowden    :  1960  MRN: [id number]    Medical/Treatment Diagnosis Information:  Diagnosis: s/p L shoulder rotator cuff repair and bicep tenodesis on 22; S46.012D (ICD-10-CM) - Traumatic tear of left rotator cuff, unspecified tear extent, subsequent encounter  Treatment Diagnosis: M25.512 (L) shoulder pain; M62.81 muscle weakness  Insurance/Certification information:  PT Insurance Information: [place]- 50 visits, no auth, $30 copay  Physician Information:   Dr. Barron Failing  Has the plan of care been signed (Y/N):        [x]  Yes  []  No     Date of Patient follow up with Physician: 22      Is this a Progress Report:     []  Yes  [x]  No        If Yes:  Date Range for reporting period:  Beginning- 2022   Ending    Progress report will be due (10 Rx or 30 days whichever is less): 10 visits or        Recertification will be due (POC Duration  / 90 days whichever is less): as above        Visit # Insurance Allowable Auth Required   3 50 []  Yes [x]  No        Functional Scale: FOTO- 4    Date assessed:  2022      Latex Allergy:  [x]NO      []YES  Preferred Language for Healthcare:   [x]English       []other:      Pain level:  0/10  on 2022    SUBJECTIVE:  pt says shoulder is not feeling bad. Sleeping is not good. He cannot sleep with it on and has to take it off at night. He states he sleeps with pillows on L side so he does not roll onto it and does not seem to move arm around. He tried different place and positions for sleep but didn't work. Exercises going well. He does ice afterwards. Pt says shins are feeling better after taking Vitamin B complex.        OBJECTIVE: See eval  Observation: :mild soreness lateral shin with flex with opposite UE assist     Ball on wall for shoulder flex     Elbow PROM    Side-lying flex     Table Slides 10 x 10 secs flex and scap Limit to 90 in HEP   Wall slides      UE [place]     Pulleys     Pendulum hold/forward bow 10 x 10 secs    BB IR     SL IR     Pec doorway stretch     CBA stretch     UT stretch Shown for HEP    LS stretch Shown for HEP    Isometrics     Retraction 10 x 10 secs         Weight shift     Flexion     Abduction     External Rotation     Internal Rotation     Biceps     Triceps          PRE's     Flexion     Abduction     External Rotation     Internal Rotation     Shrugs 3 x 10     Prone EXT     Reverse Flys- prone Ts     Prone Ys     Serratus     Horizontal Abd with ER     Biceps AROM 3 x 10  Educated on adding about 1 lb a week to these. Triceps     Retraction     Supination/pronation 3 x 10     Wrist flex/extn  3 x 10 3 lb each    External rotation in abduction (hitch hiker)     Cable Column/Theraband     External rotation to upper cut isometric     External Rotation     Internal Rotation     Shrugs     Lats     Ext     Flex     Scapular Retraction     BIC     TRIC     PNF - D2 flex     PNF- D2 extn     [place] flexion/deltoid retraining     Dynamic Stability     Ball on wall     Push ups on wall     Push ups on ball on wall     90/90 ball taps     Horizontal abd ball taps     120 overhead ball taps (Y position)     Plyoback          Manual treatment:                           Patient education: Pt was educated on PT diagnosis, prognosis, and plan of care. Pt was educated on the use of ice throughout the day. Pt was educated on signs/symptoms of infection and to call with any questions or concerns. Pt educated on post-op dressings, donning/doffing sling, dressing, precautions and restrictions.      Therapeutic Exercise and NMR EXR  [x] (19969) Provided verbal/tactile cueing for activities related to strengthening, flexibility, endurance, ROM  for improvements in scapular, scapulothoracic and UE control with self care, reaching, carrying, lifting, house/yardwork, driving/computer work.    [] (13226) Provided verbal/tactile cueing for activities related to improving balance, coordination, kinesthetic sense, posture, motor skill, proprioception  to assist with  scapular, scapulothoracic and UE control with self care, reaching, carrying, lifting, house/yardwork, driving/computer work. Therapeutic Activities:    [x] (94120 or 65510) Provided verbal/tactile cueing for activities related to improving balance, coordination, kinesthetic sense, posture, motor skill, proprioception and motor activation to allow for proper function of scapular, scapulothoracic and UE control with self care, carrying, lifting, driving/computer work. Home Exercise Program:    [x] (10641) Reviewed/Progressed HEP activities related to strengthening, flexibility, endurance, ROM of scapular, scapulothoracic and UE control with self care, reaching, carrying, lifting, house/yardwork, driving/computer work  [] (15161) Reviewed/Progressed HEP activities related to improving balance, coordination, kinesthetic sense, posture, motor skill, proprioception of scapular, scapulothoracic and UE control with self care, reaching, carrying, lifting, house/yardwork, driving/computer work    Access Code: [email or web address]  URL: [email or web address]  Date: 08/05/2022  Prepared by: Nickolas Al    Exercises  Circular Shoulder Pendulum with Table Support - 2 x daily - 7 x weekly - 1 sets - 10 reps - 10 hold  Seated Shoulder Flexion Towel Slide at Table Top - 2 x daily - 7 x weekly - 1 sets - 10 reps - 10 hold  Seated Shoulder Scaption Towel Slide at Table Top - 2 x daily - 7 x weekly - 10 reps - 1 sets - 10 hold  Supine Shoulder External Rotation with [place] - 2 x daily - 7 x weekly - 10 reps - 1 sets - 10 hold  Elbow AROM Flexion & Extension Supinated Forearm - 1 x daily - 7 x weekly - 3 sets - 10 reps  Seated Shoulder Shrugs - 2 function. [] Progressing: [] Met: [] Not Met: [] Adjusted  2. Patient will demonstrate increased L shoulder AROM to  160 flex, 170 abd, and ER at 90 to 90 degrees to allow for proper joint functioning as indicated by patients Functional Deficits. [] Progressing: [] Met: [] Not Met: [] Adjusted  3. Patient will demonstrate an increase in  L shoulder strength to 4/5 flex, abd, ER in UE to allow for proper functional mobility as indicated by patients Functional Deficits. [] Progressing: [] Met: [] Not Met: [] Adjusted  4. Patient will return to ADLs above shoulder height without increased symptoms or restriction. [] Progressing: [] Met: [] Not Met: [] Adjusted  5. 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 [] Progressing: [] Met: [] Not Met: [] Adjusted         Overall Progression Towards Functional goals/ Treatment Progress Update:  [] Patient is progressing as expected towards functional goals listed. [] Progression is slowed due to complexities/Impairments listed. [] Progression has been slowed due to co-morbidities. [x] Plan just implemented, too soon to assess goals progression <30days   [] Goals require adjustment due to lack of progress  [] Patient is not progressing as expected and requires additional follow up with physician  [] Other    Prognosis for POC: [x] Good [] Fair  [] Poor      Patient requires continued skilled intervention: [x] Yes  [] No      ASSESSMENT:  See eval    Treatment/Activity Tolerance:  [x] Patient tolerated treatment well [] Patient limited by fatique  [] Patient limited by pain  [] Patient limited by other medical complications  [x] Other:  Pt is doing well. He was able to progress to ER stretching and add in elbow AROM. He got a little sore in biceps with this but tolerable. He was educated he may slowly add weight to biceps each week as tolerated.  He will follow up at 6 weeks when he sees MD. He will call with any questions before this. Return to Play: (if applicable)   []  Stage 1: Intro to Strength   []  Stage 2: Return to Run and Strength   []  Stage 3: Return to Jump and Strength   []  Stage 4: Dynamic Strength and Agility   []  Stage 5: Sport Specific Training     []  Ready to Return to Play, Meets All Above Stages   []  Not Ready for Return to Sports   Comments:            Prognosis: [x] Good [] Fair  [] Poor    Patient Requires Follow-up: [x] Yes  [] No    PLAN: See eval; consider tables slides with more motion, pulleys, ER at 45 abd, weight to biceps and shrugs. Progress note. [x] Continue per plan of care [] Alter current plan (see comments above)  [] Plan of care initiated [] Hold pending MD visit [] Discharge    Note: If patient does not return for scheduled/ recommended follow up visits, this note will serve as a discharge from care along with most recent update on progress.      Electronically signed by: Pancho Rolle PT, DPT

## 2022-08-25 ENCOUNTER — HOSPITAL ENCOUNTER (OUTPATIENT)
Dept: PHYSICAL THERAPY | Age: 62
Setting detail: THERAPIES SERIES
Discharge: HOME | End: 2022-08-25
Payer: COMMERCIAL

## 2022-08-25 ENCOUNTER — OFFICE VISIT (OUTPATIENT)
Dept: ORTHOPEDIC SURGERY | Age: 62
End: 2022-08-25

## 2022-08-25 VITALS — HEIGHT: 70 IN | WEIGHT: 198 LBS | BODY MASS INDEX: 28.35 KG/M2

## 2022-08-25 DIAGNOSIS — M25.512 ACUTE PAIN OF LEFT SHOULDER: ICD-10-CM

## 2022-08-25 DIAGNOSIS — S46.012D TRAUMATIC TEAR OF LEFT ROTATOR CUFF, UNSPECIFIED TEAR EXTENT, SUBSEQUENT ENCOUNTER: Primary | ICD-10-CM

## 2022-08-25 PROCEDURE — 97110 THERAPEUTIC EXERCISES: CPT | Performed by: PHYSICAL THERAPIST

## 2022-08-25 PROCEDURE — 97530 THERAPEUTIC ACTIVITIES: CPT | Performed by: PHYSICAL THERAPIST

## 2022-08-25 PROCEDURE — 99024 POSTOP FOLLOW-UP VISIT: CPT | Performed by: ORTHOPAEDIC SURGERY

## 2022-08-25 NOTE — PROGRESS NOTES
Karla Cox returns today 6 weeks status post left shoulder arthroscopy with rotator cuff repair and bicep tenodesis. He occasionally has some discomfort with bicep flexion but in general is doing well. He says he sleeping dramatically better. Today, incisions look great without infection. Neurologic and vascular exams left upper extremity are normal.    He can begin to wean from his sling and continue with rehab and follow-up with me in 6 weeks.

## 2022-08-25 NOTE — LETTER
[facility] [address] [place] [address]  Phone: [phone]  Fax: [phone]    Mauricio Rendon MD        August 25, 2022     Patient: Sarika Correa   YOB: 1960   Date of Visit: 8/25/2022       To Whom It May Concern: It is my medical opinion that Lise Force may return to work on 10-3-22. If you have any questions or concerns, please don't hesitate to call.     Sincerely,      Mauricio Rendon MD

## 2022-08-25 NOTE — PLAN OF CARE
[address] [facility],[address], [place]  Physical Therapy Re-Certification Plan of Theresa Jarvis      Dear  Dr. Bess Gowers,    We had the pleasure of treating the following patient for physical therapy services at [address] [place] [address]. A summary of our findings can be found in the updated assessment below. This includes our plan of care. If you have any questions or concerns regarding these findings, please do not hesitate to contact me at the office phone number checked above. Thank you for the referral.     Physician Signature:________________________________Date:__________________  By signing above (or electronic signature), therapists plan is approved by physician    Total Visits to Date: 4  Overall Response to Treatment:   [x]Patient is responding well to treatment and improvement is noted with regards  to goals   []Patient should continue to improve in reasonable time if they continue HEP   []Patient has plateaued and is no longer responding to skilled PT intervention    []Patient is getting worse and would benefit from return to referring MD   []Patient unable to adhere to initial POC   [x]Other: Pt overall is doing well post op. He saw MD today and was told may d/c sling. Pt was educated on weaning from sling and continued precautions and restrictions still. He did well progressing his ROM today in therapy. Educated pt to only go into a stretch and not pain. He was given updated HEP and will try to set up some pulleys at home. Pt will continue to benefit from skilled PT 1-2x/week to progress ROM and total arm and scapular strengthening in this next phase of rehab per protocol.                                                           Physical Therapy Daily Treatment Note  Date:  2022    Patient Name:  Jozef Fay    :  1960  MRN: [id number]    Medical/Treatment Diagnosis Information:  Diagnosis: s/p L shoulder rotator cuff repair and bicep tenodesis on 7/13/22; S46.012D (ICD-10-CM) - Traumatic tear of left rotator cuff, unspecified tear extent, subsequent encounter  Treatment Diagnosis: M25.512 (L) shoulder pain; M62.81 muscle weakness  Insurance/Certification information:  PT Insurance Information: [place]- 50 visits, no auth, $30 copay  Physician Information:   Dr. Gisella Christensen  Has the plan of care been signed (Y/N):        [x]  Yes  []  No     Date of Patient follow up with Physician: 8/25/22      Is this a Progress Report:     [x]  Yes  []  No        If Yes:  Date Range for reporting period:  Beginning- 7/14/2022   Ending-8/25/22    Progress report will be due (10 Rx or 30 days whichever is less): 0/60/83      Recertification will be due (POC Duration  / 90 days whichever is less): as above        Visit # Insurance Allowable Auth Required   4 50 []  Yes [x]  No        Functional Scale: FOTO- 40    Date assessed:  8/25/2022      Latex Allergy:  [x]NO      []YES  Preferred Language for Healthcare:   [x]English       []other:      Pain level:  0/10  on 8/25/2022    SUBJECTIVE:  pt is 6 weeks post op. Shoulder is feeling ok. No pain at rest. Exercises are going fine. He is sleeping better.  But has not been sleeping in sling because he could not sleep but has been doing fine with pillows      OBJECTIVE:   Observation:   Test measurements:  8/25/22:     CERV ROM       Cervical Flexion       Cervical Extension       Cervical SB       Cervical rotation                     ROM PROM AROM-  Comment     L- 8/25 R L R     Flexion 130 table slides; 135 pulleys           Abduction  120 scaption table slides           ER at side  50 cane           ER at 60 abd  49           BB IR             IR at 90 abd             Other             Other                    Strength- deferred due to recent surgery L R Comment   Flexion         Abduction         ER         IR         Supraspinatus         Upper Trap Lower Trap         Mid Trap         Rhomboids         Biceps         Triceps         Horizontal Abduction         Horizontal Adduction         Lats            Orthopedic Special Tests: deferred  Special Tests Left Right   Apley Scratch IR:  ER:   Cross body: IR:  ER:  Cross Body:   Neer's       Full Can       Empty Can       Jaimie Halim       Nerve Tension Testing       Speed's       Osuna's        Spurling's       Repeated Scaption                        Reflexes/Sensation (myotomes/dermatomes): n/t     Joint mobility: n/t              []Normal                       []Hypo              []Hyper     Palpation: tender over supero-lateral portal     Functional Mobility/Transfers: Indep     Posture: fair     Bandages/Dressings/Incisions:  mild tightness anterior and superior incision. Gait: (include devices/WB status) Indep    RESTRICTIONS/PRECAUTIONS: HTN, HLD; he also has some back pain also. Carpal tunnel L UE.      Exercises/Interventions:     Exercise/Equipment Resistance/Repetitions Other comments   Aerobic Conditioning     Aerodyne          Stretching/PROM     ER supine 10 x 10 secs at side and 50 abd    Cane ER- seated at side     Wand flex     Towel press up     Passive shoulder flex with opposite UE assist     Ball on wall for shoulder flex     Elbow PROM    Side-lying flex     Table Slides 10 x 10 secs flex and abd    Wall slides      UE [place]     Pulleys 10 x 10 secs flex and scap    Pendulum hold/forward bow     BB IR     SL IR     Pec doorway stretch     CBA stretch     UT stretch Shown for HEP    LS stretch Shown for HEP    Isometrics     Retraction 10 x 10 secs         Weight shift     Flexion     Abduction     External Rotation     Internal Rotation     Biceps     Triceps          PRE's     Flexion     Abduction     External Rotation     Internal Rotation     Shrugs 3 x 10 5 lbs    Prone EXT     Reverse Flys- prone Ts     Prone Ys     Serratus     Horizontal Abd with ER     Biceps 3 x 10 5 lbs    Triceps     Retraction     Supination/pronation 3 x 10 AROM- HEP    Wrist flex/extn     External rotation in abduction (hitch hiker)     Cable Column/Theraband     External rotation to upper cut isometric     External Rotation     Internal Rotation     Shrugs     Lats     Ext     Flex     Scapular Retraction     BIC     TRIC     PNF - D2 flex     PNF- D2 extn     [place] flexion/deltoid retraining     Dynamic Stability     Ball on wall     Push ups on wall     Push ups on ball on wall     90/90 ball taps     Horizontal abd ball taps     120 overhead ball taps (Y position)     Plyoback          Manual treatment:                           Patient education: Pt was educated on PT diagnosis, prognosis, and plan of care. Pt was educated on the use of ice throughout the day. Pt educated on doing light scar massage at incisions to decrease tightness. Pt was educated on signs/symptoms of infection and to call with any questions or concerns. Pt educated on post-op dressings, donning/doffing sling, dressing, precautions and restrictions. Therapeutic Exercise and NMR EXR  [x] (75668) Provided verbal/tactile cueing for activities related to strengthening, flexibility, endurance, ROM  for improvements in scapular, scapulothoracic and UE control with self care, reaching, carrying, lifting, house/yardwork, driving/computer work.    [] (29862) Provided verbal/tactile cueing for activities related to improving balance, coordination, kinesthetic sense, posture, motor skill, proprioception  to assist with  scapular, scapulothoracic and UE control with self care, reaching, carrying, lifting, house/yardwork, driving/computer work.     Therapeutic Activities:    [x] (47594 or 99360) Provided verbal/tactile cueing for activities related to improving balance, coordination, kinesthetic sense, posture, motor skill, proprioception and motor activation to allow for proper function of scapular, scapulothoracic and UE control with increasing ROM, reducing/eliminating soft tissue swelling/inflammation/restriction, improving soft tissue extensibility and allowing for proper ROM for normal function with self care, reaching, carrying, lifting, house/yardwork, driving/computer work    Modalities: will ice on way home  Charges:  Timed Code Treatment Minutes: 45   Total Treatment Minutes: 45     [] EVAL (LOW) 01453   [] EVAL (MOD) 48786   [] EVAL (HIGH) 89894   [] RE-EVAL   [x] JZ(60244) x  2   [] IONTO  [] NMR (06764) x     [] VASO  [] Manual (41329) x      [] Other:  [x] TA x1      [] Mech Traction (25669)  [] ES(attended) (37717)      [] ES (un) (06680):       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 Independent in HEP and progression per patient tolerance, in order to prevent re-injury. [] Progressing: [x] Met: [] Not Met: [] Adjusted   2. Patient will have a decrease in pain to facilitate improvement in movement, function, and ADLs as indicated by Functional Deficits. [] Progressing: [x] Met: [] Not Met: [] Adjusted     Long Term Goals: To be achieved in: 5-6 months  1. Functional index score of 65% or more for the FOTO to assist with reaching prior level of function. [x] Progressing: [] Met: [x] Not Met: [] Adjusted  2. Patient will demonstrate increased L shoulder AROM to  160 flex, 170 abd, and ER at 90 to 90 degrees to allow for proper joint functioning as indicated by patients Functional Deficits. [x] Progressing: [] Met: [x] Not Met: [] Adjusted  3. Patient will demonstrate an increase in  L shoulder strength to 4/5 flex, abd, ER in UE to allow for proper functional mobility as indicated by patients Functional Deficits. [x] Progressing: [] Met: [x] Not Met: [] Adjusted  4. Patient will return to ADLs above shoulder height without increased symptoms or restriction. [x] Progressing: [] Met: [x] Not Met: [] Adjusted  5.  Pt will return abd, triceps, bent over row  [x] Continue per plan of care [] Alter current plan (see comments above)  [] Plan of care initiated [] Hold pending MD visit [] Discharge    Note: If patient does not return for scheduled/ recommended follow up visits, this note will serve as a discharge from care along with most recent update on progress.      Electronically signed by: Demarucs Condon, PT, DPT

## 2022-08-26 ENCOUNTER — TELEPHONE (OUTPATIENT)
Dept: ORTHOPEDIC SURGERY | Age: 62
End: 2022-08-26

## 2022-08-30 ENCOUNTER — HOSPITAL ENCOUNTER (OUTPATIENT)
Dept: PHYSICAL THERAPY | Age: 62
Setting detail: THERAPIES SERIES
Discharge: HOME | End: 2022-08-30
Payer: COMMERCIAL

## 2022-08-30 PROCEDURE — 97530 THERAPEUTIC ACTIVITIES: CPT | Performed by: PHYSICAL THERAPIST

## 2022-08-30 PROCEDURE — 97110 THERAPEUTIC EXERCISES: CPT | Performed by: PHYSICAL THERAPIST

## 2022-08-30 NOTE — FLOWSHEET NOTE
[facility] [address], [place] [address] [place] [address]  Phone: [phone]   Fax: [phone]                                                       Physical Therapy Daily Treatment Note  Date:  2022    Patient Name:  Rima Arredondo    :  1960  MRN: [id number]    Medical/Treatment Diagnosis Information:  Diagnosis: s/p L shoulder rotator cuff repair and bicep tenodesis on 22; S46.012D (ICD-10-CM) - Traumatic tear of left rotator cuff, unspecified tear extent, subsequent encounter  Treatment Diagnosis: M25.512 (L) shoulder pain; M62.81 muscle weakness  Insurance/Certification information:  PT Insurance Information: [place]- 50 visits, no auth, $30 copay  Physician Information:   Dr. Marta Lock  Has the plan of care been signed (Y/N):        [x]  Yes  []  No     Date of Patient follow up with Physician: 22      Is this a Progress Report:     [x]  Yes  []  No        If Yes:  Date Range for reporting period:  Beginning- 2022   Ending-22    Progress report will be due (10 Rx or 30 days whichever is less):       Recertification will be due (POC Duration  / 90 days whichever is less): as above        Visit # Insurance Allowable Auth Required   5 50 []  Yes [x]  No        Functional Scale: FOTO- 40    Date assessed:  2022      Latex Allergy:  [x]NO      []YES  Preferred Language for Healthcare:   [x]English       []other:      Pain level:  0/10  on 2022    SUBJECTIVE:  pt is 6.5 weeks post op. Pt reports shoulder is feeling good. Pt is a little sore with pulley and cane stretch. He did set up some pulleys at home. Pts leave from work was extended to 12 weeks. He reports he doesn't really have to use arm for work.        OBJECTIVE:   Observation:   Test measurements:  22:     CERV ROM       Cervical Flexion       Cervical Extension       Cervical SB       Cervical rotation                     ROM PROM AROM-  Comment     L- 8/30 R L R     Flexion           Abduction           ER at side  70 cane           ER at 90 abd  77           BB IR             IR at 90 abd             Other             Other                    Strength- deferred due to recent surgery L R Comment   Flexion         Abduction         ER         IR         Supraspinatus         Upper Trap         Lower Trap         Mid Trap         Rhomboids         Biceps         Triceps         Horizontal Abduction         Horizontal Adduction         Lats            Orthopedic Special Tests: deferred  Special Tests Left Right   Apley Scratch IR:  ER:   Cross body: IR:  ER:  Cross Body:   Neer's       Full Can       Empty Can       Tyson Otto       Nerve Tension Testing       Speed's       Osuna's        Spurling's       Repeated Scaption                        Reflexes/Sensation (myotomes/dermatomes): n/t     Joint mobility: n/t              []Normal                       []Hypo              []Hyper     Palpation: tender over supero-lateral portal     Functional Mobility/Transfers: Indep     Posture: fair     Bandages/Dressings/Incisions:  mild tightness anterior and superior incision. Gait: (include devices/WB status) Indep    RESTRICTIONS/PRECAUTIONS: HTN, HLD; he also has some back pain also. Carpal tunnel L UE.      Exercises/Interventions:     Exercise/Equipment Resistance/Repetitions Other comments   Aerobic Conditioning     Aerodyne          Stretching/PROM     ER supine 10 x 10 secs at side and 90 abd    Cane ER- seated at side     Wand flex 10 x 5 secs     Towel press up     Passive shoulder flex with opposite UE assist     Ball on wall for shoulder flex     Elbow PROM    Side-lying flex     Table Slides 10 x 10 secs flex and abd on incline    Wall slides      UE [place]     Pulleys 10 x 10 secs flex and scap    Pendulum hold/forward bow     BB IR     SL IR     Pec doorway stretch     CBA stretch     UT stretch Shown for HEP    LS stretch [place] for HEP    Isometrics     Retraction 10 x 10 secs         Weight shift     Flexion     Abduction     External Rotation     Internal Rotation     Biceps     Triceps          PRE's     Flexion     Abduction     External Rotation     Internal Rotation     Shrugs 3 x 10 5 lbs    Prone EXT     Reverse Flys- prone Ts     Prone Ys     Bent over row 3 x 10 5 lbs    Serratus     Horizontal Abd with ER     Biceps 3 x 10 5 lbs    Triceps     Retraction     Supination/pronation    Wrist flex/extn     External rotation in abduction (hitch hiker)     Cable Column/Theraband     External rotation to upper cut isometric     External Rotation     Internal Rotation     Shrugs     Lats     Ext     Flex     Scapular Retraction     BIC     TRIC 3 x 10 green TB    PNF - D2 flex     PNF- D2 extn     [place] flexion/deltoid retraining     Dynamic Stability     Ball on wall     Push ups on wall     Push ups on ball on wall     90/90 ball taps     Horizontal abd ball taps     120 overhead ball taps (Y position)     Plyoback          Manual treatment:                           Patient education: Pt was educated on PT diagnosis, prognosis, and plan of care. Pt was educated on the use of ice throughout the day. Pt educated on doing light scar massage at incisions to decrease tightness. Pt was educated on signs/symptoms of infection and to call with any questions or concerns. Pt educated on post-op dressings, donning/doffing sling, dressing, precautions and restrictions.      Therapeutic Exercise and NMR EXR  [x] (08830) Provided verbal/tactile cueing for activities related to strengthening, flexibility, endurance, ROM  for improvements in scapular, scapulothoracic and UE control with self care, reaching, carrying, lifting, house/yardwork, driving/computer work.    [] (04580) Provided verbal/tactile cueing for activities related to improving balance, coordination, kinesthetic sense, posture, motor skill, proprioception  to assist with  scapular, scapulothoracic and UE control with self care, reaching, carrying, lifting, house/yardwork, driving/computer work. Therapeutic Activities:    [x] (46641 or 03037) Provided verbal/tactile cueing for activities related to improving balance, coordination, kinesthetic sense, posture, motor skill, proprioception and motor activation to allow for proper function of scapular, scapulothoracic and UE control with self care, carrying, lifting, driving/computer work. Home Exercise Program:    [x] (92030) Reviewed/Progressed HEP activities related to strengthening, flexibility, endurance, ROM of scapular, scapulothoracic and UE control with self care, reaching, carrying, lifting, house/yardwork, driving/computer work  [] (19196) Reviewed/Progressed HEP activities related to improving balance, coordination, kinesthetic sense, posture, motor skill, proprioception of scapular, scapulothoracic and UE control with self care, reaching, carrying, lifting, house/yardwork, driving/computer work    Access Code: [email or web address]  URL: [email or web address]  Date: 08/30/2022  Prepared by: Cecil Cortes    Exercises  Seated Shoulder Flexion Towel Slide at Table Top - 2 x daily - 7 x weekly - 1 sets - 10 reps - 10 hold  Seated Shoulder Scaption Towel Slide at Table Top - 2 x daily - 7 x weekly - 10 reps - 1 sets - 10 hold  Seated Shoulder Flexion AAROM with Pulley Behind - 2 x daily - 7 x weekly - 10 reps - 1 sets - 10 hold  Seated Shoulder Scaption AAROM with Pulley at Side - 2 x daily - 7 x weekly - 10 reps - 1 sets - 10 hold  Supine Shoulder External Rotation with Dowel - 2 x daily - 7 x weekly - 10 reps - 1 sets - 10 hold  Supine Shoulder External Rotation in Scaption AAROM - 2 x daily - 7 x weekly - 10 reps - 1 sets - 10 hold  Supine Shoulder Flexion with Dowel - 2 x daily - 7 x weekly - 10 reps - 1 sets - 10 hold  Standing Bicep Curls Supinated with Dumbbells - 1 x daily - 7 x weekly - 3 sets - 10 reps  Seated Shoulder Shrugs - 1 x daily - 7 x weekly - 3 sets - 10 reps  Standing Tricep Extensions with Resistance - 1 x daily - 7 x weekly - 3 sets - 10 reps  Seated Scapular Retraction - 2 x daily - 7 x weekly - 1 sets - 10 reps - 10 hold          Manual Treatments:  PROM / STM / Oscillations-Mobs:  G-I, II, III, IV (PA's, Inf., Post.)  [] (00147) Provided manual therapy to mobilize soft tissue/joints of cervical/CT, scapular GHJ and UE for the purpose of modulating pain, promoting relaxation,  increasing ROM, reducing/eliminating soft tissue swelling/inflammation/restriction, improving soft tissue extensibility and allowing for proper ROM for normal function with self care, reaching, carrying, lifting, house/yardwork, driving/computer work    Modalities: will ice on way home  Charges:  Timed Code Treatment Minutes: 35   Total Treatment Minutes: 40     [] EVAL (LOW) 33472   [] EVAL (MOD) 62434   [] EVAL (HIGH) 78889   [] RE-EVAL   [x] US(63957) x  1   [] IONTO  [] NMR (21047) x     [] VASO  [] Manual (56881) x      [] Other:  [x] TA x1      [] Mech Traction (64675)  [] ES(attended) (02657)      [] ES (un) (00809):       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 Independent in HEP and progression per patient tolerance, in order to prevent re-injury. [] Progressing: [x] Met: [] Not Met: [] Adjusted   2. Patient will have a decrease in pain to facilitate improvement in movement, function, and ADLs as indicated by Functional Deficits. [] Progressing: [x] Met: [] Not Met: [] Adjusted     Long Term Goals: To be achieved in: 5-6 months  1. Functional index score of 65% or more for the FOTO to assist with reaching prior level of function. [x] Progressing: [] Met: [x] Not Met: [] Adjusted  2.  Patient will demonstrate increased L shoulder AROM to  160 flex, 170 abd, and ER at 90 to 90 degrees to allow for proper joint functioning as indicated by patients Functional Deficits. [x] Progressing: [] Met: [x] Not Met: [] Adjusted  3. Patient will demonstrate an increase in  L shoulder strength to 4/5 flex, abd, ER in UE to allow for proper functional mobility as indicated by patients Functional Deficits. [x] Progressing: [] Met: [x] Not Met: [] Adjusted  4. Patient will return to ADLs above shoulder height without increased symptoms or restriction. [x] Progressing: [] Met: [x] Not Met: [] Adjusted  5. 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 [x] Progressing: [] Met: [x] Not Met: [] Adjusted         Overall Progression Towards Functional goals/ Treatment Progress Update:  [x] Patient is progressing as expected towards functional goals listed. [] Progression is slowed due to complexities/Impairments listed. [] Progression has been slowed due to co-morbidities. [] Plan just implemented, too soon to assess goals progression <30days   [] Goals require adjustment due to lack of progress  [] Patient is not progressing as expected and requires additional follow up with physician  [] Other    Prognosis for POC: [x] Good [] Fair  [] Poor      Patient requires continued skilled intervention: [x] Yes  [] No      ASSESSMENT:    Treatment/Activity Tolerance:  [x] Patient tolerated treatment well [] Patient limited by fatique  [] Patient limited by pain  [] Patient limited by other medical complications  [x] Other:  pt arrived 15 mins late to appt. He was able to advance ROM on stretches throughout session today. Added in more total arm and scapular strengthening without increase in pain or discomfort in shoulder. Given updated HEP. Pt will continue to benefit from skilled PT 1-2x/week to progress ROM and total arm and scapular strengthening in this next phase of rehab per protocol.        Return to Play: (if applicable)   []  Stage 1: Intro to Strength   [] Stage 2: Return to Run and Strength   []  Stage 3: Return to Jump and Strength   []  Stage 4: Dynamic Strength and Agility   []  Stage 5: Sport Specific Training     []  Ready to Return to Play, Meets All Above Stages   []  Not Ready for Return to Sports   Comments:            Prognosis: [x] Good [] Fair  [] Poor    Patient Requires Follow-up: [x] Yes  [] No    PLAN: 1-2x/week for 6 weeks; consider prone extn; serratus punches with cane  [x] Continue per plan of care [] Alter current plan (see comments above)  [] Plan of care initiated [] Hold pending MD visit [] Discharge    Note: If patient does not return for scheduled/ recommended follow up visits, this note will serve as a discharge from care along with most recent update on progress.      Electronically signed by: Demarcus Condon, PT, DPT

## 2022-09-02 ENCOUNTER — HOSPITAL ENCOUNTER (OUTPATIENT)
Dept: PHYSICAL THERAPY | Age: 62
Setting detail: THERAPIES SERIES
Discharge: HOME | End: 2022-09-02
Payer: COMMERCIAL

## 2022-09-02 PROCEDURE — 97110 THERAPEUTIC EXERCISES: CPT | Performed by: PHYSICAL THERAPIST

## 2022-09-02 PROCEDURE — 97530 THERAPEUTIC ACTIVITIES: CPT | Performed by: PHYSICAL THERAPIST

## 2022-09-02 NOTE — FLOWSHEET NOTE
[address] [facility],[address], [place] [address] [address] [facility], [place], [address]   Phone: [phone]   Fax: [phone]                                                       Physical Therapy Daily Treatment Note  Date:  2022    Patient Name:  Karla Cox    :  1960  MRN: [id number]    Medical/Treatment Diagnosis Information:  Diagnosis: s/p L shoulder rotator cuff repair and bicep tenodesis on 22; S46.012D (ICD-10-CM) - Traumatic tear of left rotator cuff, unspecified tear extent, subsequent encounter  Treatment Diagnosis: M25.512 (L) shoulder pain; M62.81 muscle weakness  Insurance/Certification information:  PT Insurance Information: [employer]- 50 visits, no auth, $30 copay  Physician Information:   Dr. Deloris Mari  Has the plan of care been signed (Y/N):        []  Yes  []  No     Date of Patient follow up with Physician: 10/6/22      Is this a Progress Report:     [x]  Yes  []  No        If Yes:  Date Range for reporting period:  Beginning- 2022   Ending-22    Progress report will be due (10 Rx or 30 days whichever is less):       Recertification will be due (POC Duration  / 90 days whichever is less): as above        Visit # Insurance Allowable Auth Required   6 50 []  Yes [x]  No        Functional Scale: FOTO- 40    Date assessed:  2022      Latex Allergy:  [x]NO      []YES  Preferred Language for Healthcare:   [x]English       []other:      Pain level:  0/10  on 2022    SUBJECTIVE:  pt is 7.5 weeks post op. Pt reports his shoulder is feeling good. Exercises are going well. Felt ok after last tx. Pt reports he did trip over a baby gate that he forgot about but did not hurt his L shoulder. Landed on R side.        OBJECTIVE:   Observation:   Test measurements:  22:     CERV ROM       Cervical Flexion       Cervical Extension       Cervical SB       Cervical rotation ROM PROM AROM-  Comment     L- 9/2 R L R     Flexion 151 pulleys           Abduction  168 scaption table slides           ER at side  78 cane           ER at 90 abd  78           BB IR             IR at 90 abd             Other             Other                    Strength- deferred due to recent surgery L R Comment   Flexion         Abduction         ER         IR         Supraspinatus         Upper Trap         Lower Trap         Mid Trap         Rhomboids         Biceps         Triceps         Horizontal Abduction         Horizontal Adduction         Lats            Orthopedic Special Tests: deferred  Special Tests Left Right   Apley Scratch IR:  ER:   Cross body: IR:  ER:  Cross Body:   Neer's       Full Can       Empty Can       Alfred Passe       Nerve Tension Testing       Speed's       Osuna's        Spurling's       Repeated Scaption                        Reflexes/Sensation (myotomes/dermatomes): n/t     Joint mobility: n/t              []Normal                       []Hypo              []Hyper     Palpation: tender over supero-lateral portal     Functional Mobility/Transfers: Indep     Posture: fair     Bandages/Dressings/Incisions:  mild tightness anterior and superior incision. Gait: (include devices/WB status) Indep    RESTRICTIONS/PRECAUTIONS: HTN, HLD; he also has some back pain also. Carpal tunnel L UE.      Exercises/Interventions:     Exercise/Equipment Resistance/Repetitions Other comments   Aerobic Conditioning     Aerodyne          Stretching/PROM     ER supine 10 x 10 secs at side and 90 abd    Cane ER- seated at side     Wand flex 10 x 5 secs     Towel press up     Passive shoulder flex with opposite UE assist     Ball on wall for shoulder flex     Elbow PROM    Side-lying flex     Table Slides    Wall slides  10 x 10 secs flex    UE Elim     Pulleys 10 x 10 secs flex and scap    Pendulum hold/forward bow     BB IR     SL IR     Pec doorway stretch     CBA stretch     UT stretch Shown for HEP    LS stretch Shown for HEP    Isometrics     Retraction        Weight shift     Flexion     Abduction     External Rotation     Internal Rotation     Biceps     Triceps          PRE's     Flexion     Abduction     External Rotation     Internal Rotation     Shrugs 3 x 10 5 lbs    Prone EXT 2 x 10 hold 3 secs    Reverse Flys- prone Ts     Prone Ys     Bent over row 3 x 10 5 lbs    Serratus 3 x 10  with cane    Horizontal Abd with ER     Biceps 3 x 10 5 lbs    Triceps     Retraction     Supination/pronation    Wrist flex/extn     External rotation in abduction (hitch hiker)     Cable Column/Theraband     External rotation to upper cut isometric     External Rotation     Internal Rotation     Shrugs     Lats     Ext     Flex     Scapular Retraction     BIC     TRIC 3 x 10 orange TB    PNF - D2 flex     PNF- D2 extn     HARSTAD flexion/deltoid retraining     Dynamic Stability     Ball on wall     Push ups on wall     Push ups on ball on wall     90/90 ball taps     Horizontal abd ball taps     120 overhead ball taps (Y position)     Plyoback          Manual treatment:                           Patient education: Pt was educated on PT diagnosis, prognosis, and plan of care. Pt was educated on the use of ice throughout the day. Pt educated on doing light scar massage at incisions to decrease tightness. Pt was educated on signs/symptoms of infection and to call with any questions or concerns. Pt educated on post-op dressings, donning/doffing sling, dressing, precautions and restrictions.      Therapeutic Exercise and NMR EXR  [x] (66755) Provided verbal/tactile cueing for activities related to strengthening, flexibility, endurance, ROM  for improvements in scapular, scapulothoracic and UE control with self care, reaching, carrying, lifting, house/yardwork, driving/computer work.    [] (57452) Provided verbal/tactile cueing for activities related to improving balance, coordination, kinesthetic sense, posture, motor skill, proprioception  to assist with  scapular, scapulothoracic and UE control with self care, reaching, carrying, lifting, house/yardwork, driving/computer work. Therapeutic Activities:    [x] (74809 or 23827) Provided verbal/tactile cueing for activities related to improving balance, coordination, kinesthetic sense, posture, motor skill, proprioception and motor activation to allow for proper function of scapular, scapulothoracic and UE control with self care, carrying, lifting, driving/computer work. Home Exercise Program:    [x] (82143) Reviewed/Progressed HEP activities related to strengthening, flexibility, endurance, ROM of scapular, scapulothoracic and UE control with self care, reaching, carrying, lifting, house/yardwork, driving/computer work  [] (61519) Reviewed/Progressed HEP activities related to improving balance, coordination, kinesthetic sense, posture, motor skill, proprioception of scapular, scapulothoracic and UE control with self care, reaching, carrying, lifting, house/yardwork, driving/computer work    Access Code: [email or web address]  URL: [employer]/  Date: 09/02/2022  Prepared by: Priscila Perez    Exercises  Seated Shoulder Flexion AAROM with Pulley Behind - 2 x daily - 7 x weekly - 10 reps - 1 sets - 10 hold  Seated Shoulder Scaption AAROM with Pulley at Side - 2 x daily - 7 x weekly - 10 reps - 1 sets - 10 hold  Scaption Wall Slide with Towel - 2 x daily - 7 x weekly - 10 reps - 1 sets - 10 hold  Supine Shoulder External Rotation with Dowel - 2 x daily - 7 x weekly - 10 reps - 1 sets - 10 hold  Supine Shoulder External Rotation in Scaption AAROM - 2 x daily - 7 x weekly - 10 reps - 1 sets - 10 hold  Supine Shoulder Flexion with Dowel - 2 x daily - 7 x weekly - 10 reps - 1 sets - 10 hold  Supine Scapular Protraction in Flexion with Dumbbells - 1 x daily - 7 x weekly - 3 sets - 10 reps  Standing Bicep Curls Supinated with Dumbbells - 1 x daily - 7 x weekly - 3 sets - 10 reps  Seated Shoulder Shrugs - 1 x daily - 7 x weekly - 3 sets - 10 reps  Standing Tricep Extensions with Resistance - 1 x daily - 7 x weekly - 3 sets - 10 reps  Seated Scapular Retraction - 2 x daily - 7 x weekly - 1 sets - 10 reps - 10 hold        Manual Treatments:  PROM / STM / Oscillations-Mobs:  G-I, II, III, IV (PA's, Inf., Post.)  [] (38030) Provided manual therapy to mobilize soft tissue/joints of cervical/CT, scapular GHJ and UE for the purpose of modulating pain, promoting relaxation,  increasing ROM, reducing/eliminating soft tissue swelling/inflammation/restriction, improving soft tissue extensibility and allowing for proper ROM for normal function with self care, reaching, carrying, lifting, house/yardwork, driving/computer work    Modalities: will ice on way home  Charges:  Timed Code Treatment Minutes: 39   Total Treatment Minutes: 40     [] EVAL (LOW) 61259   [] EVAL (MOD) 11064   [] EVAL (HIGH) 89962   [] RE-EVAL   [x] GP(54806) x  2   [] IONTO  [] NMR (40962) x     [] VASO  [] Manual (34687) x      [] Other:  [x] TA x1      [] Mech Traction (48181)  [] ES(attended) (09514)      [] ES (un) (33993):       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 Independent in HEP and progression per patient tolerance, in order to prevent re-injury. [] Progressing: [x] Met: [] Not Met: [] Adjusted   2. Patient will have a decrease in pain to facilitate improvement in movement, function, and ADLs as indicated by Functional Deficits. [] Progressing: [x] Met: [] Not Met: [] Adjusted     Long Term Goals: To be achieved in: 5-6 months  1. Functional index score of 65% or more for the FOTO to assist with reaching prior level of function. [x] Progressing: [] Met: [x] Not Met: [] Adjusted  2.  Patient will demonstrate increased L shoulder AROM to  160 flex, 170 abd, and ER at 90 to 90 degrees to allow for proper joint functioning as indicated by patients Functional Deficits. [x] Progressing: [] Met: [x] Not Met: [] Adjusted  3. Patient will demonstrate an increase in  L shoulder strength to 4/5 flex, abd, ER in UE to allow for proper functional mobility as indicated by patients Functional Deficits. [x] Progressing: [] Met: [x] Not Met: [] Adjusted  4. Patient will return to ADLs above shoulder height without increased symptoms or restriction. [x] Progressing: [] Met: [x] Not Met: [] Adjusted  5. 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 [x] Progressing: [] Met: [x] Not Met: [] Adjusted         Overall Progression Towards Functional goals/ Treatment Progress Update:  [x] Patient is progressing as expected towards functional goals listed. [] Progression is slowed due to complexities/Impairments listed. [] Progression has been slowed due to co-morbidities. [] Plan just implemented, too soon to assess goals progression <30days   [] Goals require adjustment due to lack of progress  [] Patient is not progressing as expected and requires additional follow up with physician  [] Other    Prognosis for POC: [x] Good [] Fair  [] Poor      Patient requires continued skilled intervention: [x] Yes  [] No      ASSESSMENT:    Treatment/Activity Tolerance:  [x] Patient tolerated treatment well [] Patient limited by fatique  [] Patient limited by pain  [] Patient limited by other medical complications  [x] Other:   pt did well progressing in AAROM/RPOM throughout session today. Tolerated progression to wall slides well today. Given updated HEP today. Did report some soreness in his L pec but declined soft tissue work today. Pt will continue to benefit from skilled PT 1-2x/week to progress ROM and total arm and scapular strengthening in this next phase of rehab per protocol.        Return to Play: (if applicable)   []  Stage 1: Intro to Strength   []  Stage 2: Return to Run and Strength   []  Stage 3: Return to Jump and Strength   []  Stage 4: Dynamic Strength and Agility   []  Stage 5: Sport Specific Training   []  Ready to Return to Play, Meets All Above Stages   []  Not Ready for Return to Sports   Comments:            Prognosis: [x] Good [] Fair  [] Poor    Patient Requires Follow-up: [x] Yes  [] No    PLAN: 1-2x/week for 6 weeks; consider SL IR, wall slides scap, pulleys abd; 1x/week next week? [x] Continue per plan of care [] Alter current plan (see comments above)  [] Plan of care initiated [] Hold pending MD visit [] Discharge    Note: If patient does not return for scheduled/ recommended follow up visits, this note will serve as a discharge from care along with most recent update on progress.      Electronically signed by: Rodríguez Alejandra, PT, DPT

## 2022-09-06 ENCOUNTER — HOSPITAL ENCOUNTER (OUTPATIENT)
Dept: PHYSICAL THERAPY | Age: 62
Setting detail: THERAPIES SERIES
Discharge: HOME | End: 2022-09-06
Payer: COMMERCIAL

## 2022-09-06 PROCEDURE — 97110 THERAPEUTIC EXERCISES: CPT | Performed by: PHYSICAL THERAPIST

## 2022-09-06 PROCEDURE — 97530 THERAPEUTIC ACTIVITIES: CPT | Performed by: PHYSICAL THERAPIST

## 2022-09-06 NOTE — FLOWSHEET NOTE
Charles 79, [place] [address] [place] [address], [address] [place] [address]  Phone: [phone]   Fax: [phone]                                                       Physical Therapy Daily Treatment Note  Date:  2022    Patient Name:  Paul Atkins    :  1960  MRN: [id number]    Medical/Treatment Diagnosis Information:  Diagnosis: s/p L shoulder rotator cuff repair and bicep tenodesis on 22; S46.012D (ICD-10-CM) - Traumatic tear of left rotator cuff, unspecified tear extent, subsequent encounter  Treatment Diagnosis: M25.512 (L) shoulder pain; M62.81 muscle weakness  Insurance/Certification information:  PT Insurance Information: [employer]- 50 visits, no auth, $30 copay  Physician Information:   Dr. Zulma Ellis  Has the plan of care been signed (Y/N):        []  Yes  []  No     Date of Patient follow up with Physician: 10/6/22      Is this a Progress Report:     [x]  Yes  []  No        If Yes:  Date Range for reporting period:  Beginning- 2022   Ending-22    Progress report will be due (10 Rx or 30 days whichever is less):       Recertification will be due (POC Duration  / 90 days whichever is less): as above        Visit # Insurance Allowable Auth Required   7 50 []  Yes [x]  No        Functional Scale: FOTO- 40    Date assessed:  2022      Latex Allergy:  [x]NO      []YES  Preferred Language for Healthcare:   [x]English       []other:      Pain level:  0/10  on 2022    SUBJECTIVE:  pt is 8 weeks post op. Pt says shoulder is feeling good. Felt fine after last session.        OBJECTIVE:   Observation:   Test measurements:  22:     CERV ROM       Cervical Flexion       Cervical Extension       Cervical SB       Cervical rotation                     ROM PROM AROM-  Comment     L-  R L R     Flexion 150 pulleys           Abduction  170 scaption table slides           ER at side  81 cane           ER at 90 abd  100           BB IR             IR at 90 abd             Other             Other                    Strength- deferred due to recent surgery L R Comment   Flexion         Abduction         ER         IR         Supraspinatus         Upper Trap         Lower Trap         Mid Trap         Rhomboids         Biceps         Triceps         Horizontal Abduction         Horizontal Adduction         Lats            Orthopedic Special Tests: deferred  Special Tests Left Right   Apley Scratch IR:  ER:   Cross body: IR:  ER:  Cross Body:   Neer's       Full Can       Empty Can       Marva Gaytan       Nerve Tension Testing       Speed's       Osuna's        Spurling's       Repeated Scaption                        Reflexes/Sensation (myotomes/dermatomes): n/t     Joint mobility: n/t              []Normal                       []Hypo              []Hyper     Palpation: tender over supero-lateral portal     Functional Mobility/Transfers: Indep     Posture: fair     Bandages/Dressings/Incisions:  mild tightness anterior and superior incision. Gait: (include devices/WB status) Indep    RESTRICTIONS/PRECAUTIONS: HTN, HLD; he also has some back pain also. Carpal tunnel L UE.      Exercises/Interventions:     Exercise/Equipment Resistance/Repetitions Other comments   Aerobic Conditioning     Aerodyne          Stretching/PROM     ER supine 10 x 10 secs at side and 90 abd    Cane ER- seated at side     Wand flex 10 x 5 secs     Towel press up     Passive shoulder flex with opposite UE assist     Ball roll on table Flex and H abd/add green x 20 each    Ball on wall for shoulder flex     Elbow PROM    Side-lying flex     Table Slides    Wall slides  10 x 10 secs flex and scap/abd    UE Enterprise     Pulleys 10 x 10 secs flex and abd    Pendulum hold/forward bow     BB IR     SL IR 10x 10 secs    Pec doorway stretch     CBA stretch     UT stretch Shown for HEP    LS stretch Shown for HEP    Isometrics Retraction        Weight shift     Flexion     Abduction     External Rotation     Internal Rotation     Biceps     Triceps          PRE's     Flexion     Abduction     External Rotation     Internal Rotation     Shrugs 3 x 10 5 lbs    Prone EXT 2 x 10 hold 3 secs    Reverse Flys- prone Ts     Prone Ys     Bent over row 3 x 10 5 lbs    Serratus 3 x 10  with cane    Horizontal Abd with ER     Biceps 3 x 10 5 lbs    Triceps     Retraction     Supination/pronation    Wrist flex/extn     External rotation in abduction (hitch hiker)     Cable Column/Theraband     External rotation to upper cut isometric     External Rotation     Internal Rotation     Shrugs     Lats     Ext     Flex     Scapular Retraction     BIC     TRIC 3 x 10 orange TB    PNF - D2 flex     PNF- D2 extn     [place] flexion/deltoid retraining     Dynamic Stability     Ball on wall     Push ups on wall     Push ups on ball on wall     90/90 ball taps     Horizontal abd ball taps     120 overhead ball taps (Y position)     Plyoback          Manual treatment:                           Patient education: Pt was educated on PT diagnosis, prognosis, and plan of care. Pt was educated on the use of ice throughout the day. Pt educated on doing light scar massage at incisions to decrease tightness. Pt was educated on signs/symptoms of infection and to call with any questions or concerns. Pt educated on post-op dressings, donning/doffing sling, dressing, precautions and restrictions.      Therapeutic Exercise and NMR EXR  [x] (57382) Provided verbal/tactile cueing for activities related to strengthening, flexibility, endurance, ROM  for improvements in scapular, scapulothoracic and UE control with self care, reaching, carrying, lifting, house/yardwork, driving/computer work.    [] (36250) Provided verbal/tactile cueing for activities related to improving balance, coordination, kinesthetic sense, posture, motor skill, proprioception  to assist with  scapular, scapulothoracic and UE control with self care, reaching, carrying, lifting, house/yardwork, driving/computer work. Therapeutic Activities:    [x] (42056 or 66306) Provided verbal/tactile cueing for activities related to improving balance, coordination, kinesthetic sense, posture, motor skill, proprioception and motor activation to allow for proper function of scapular, scapulothoracic and UE control with self care, carrying, lifting, driving/computer work. Home Exercise Program:    [x] (50374) Reviewed/Progressed HEP activities related to strengthening, flexibility, endurance, ROM of scapular, scapulothoracic and UE control with self care, reaching, carrying, lifting, house/yardwork, driving/computer work  [] (66382) Reviewed/Progressed HEP activities related to improving balance, coordination, kinesthetic sense, posture, motor skill, proprioception of scapular, scapulothoracic and UE control with self care, reaching, carrying, lifting, house/yardwork, driving/computer work    Access Code: [email or web address]  URL: [email or web address]  Date: 09/02/2022  Prepared by: Mckinley Portillo    Exercises  Seated Shoulder Flexion AAROM with Pulley Behind - 2 x daily - 7 x weekly - 10 reps - 1 sets - 10 hold  Seated Shoulder Scaption AAROM with Pulley at Side - 2 x daily - 7 x weekly - 10 reps - 1 sets - 10 hold  Scaption Wall Slide with Towel - 2 x daily - 7 x weekly - 10 reps - 1 sets - 10 hold  Supine Shoulder External Rotation with Dowel - 2 x daily - 7 x weekly - 10 reps - 1 sets - 10 hold  Supine Shoulder External Rotation in Scaption AAROM - 2 x daily - 7 x weekly - 10 reps - 1 sets - 10 hold  Supine Shoulder Flexion with Dowel - 2 x daily - 7 x weekly - 10 reps - 1 sets - 10 hold  Supine Scapular Protraction in Flexion with Dumbbells - 1 x daily - 7 x weekly - 3 sets - 10 reps  Standing Bicep Curls Supinated with Dumbbells - 1 x daily - 7 x weekly - 3 sets - 10 reps  Seated Shoulder Shrugs - 1 x daily - 7 x weekly - 3 sets - 10 reps  Standing Tricep Extensions with Resistance - 1 x daily - 7 x weekly - 3 sets - 10 reps  Seated Scapular Retraction - 2 x daily - 7 x weekly - 1 sets - 10 reps - 10 hold    Access Code: [email or web address]  URL: [email or web address]  Date: 09/06/2022  Prepared by: Rani Peraza    Exercises  Standing Shoulder Abduction Wall Slide with Thumb Out - 2 x daily - 7 x weekly - 1 sets - 10 reps - 10 hold  Sleeper Stretch - 2 x daily - 7 x weekly - 10 reps - 1 sets - 10 hold      Manual Treatments:  PROM / STM / Oscillations-Mobs:  G-I, II, III, IV (PA's, Inf., Post.)  [] (33477) Provided manual therapy to mobilize soft tissue/joints of cervical/CT, scapular GHJ and UE for the purpose of modulating pain, promoting relaxation,  increasing ROM, reducing/eliminating soft tissue swelling/inflammation/restriction, improving soft tissue extensibility and allowing for proper ROM for normal function with self care, reaching, carrying, lifting, house/yardwork, driving/computer work    Modalities: will ice on way home  Charges:  Timed Code Treatment Minutes: 38   Total Treatment Minutes: 38     [] EVAL (LOW) 37948   [] EVAL (MOD) 00300   [] EVAL (HIGH) 98515   [] RE-EVAL   [x] NL(09749) x  2   [] IONTO  [] NMR (09625) x     [] VASO  [] Manual (39809) x      [] Other:  [x] TA x1      [] Mech Traction (64780)  [] ES(attended) (32686)      [] ES (un) (35737):       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 Independent in HEP and progression per patient tolerance, in order to prevent re-injury. [] Progressing: [x] Met: [] Not Met: [] Adjusted   2. Patient will have a decrease in pain to facilitate improvement in movement, function, and ADLs as indicated by Functional Deficits. [] Progressing: [x] Met: [] Not Met: [] Adjusted     Long Term Goals: To be achieved in: 5-6 months  1.  Functional index score of 65% or more for the FOTO to assist with reaching prior level of function. [x] Progressing: [] Met: [x] Not Met: [] Adjusted  2. Patient will demonstrate increased L shoulder AROM to  160 flex, 170 abd, and ER at 90 to 90 degrees to allow for proper joint functioning as indicated by patients Functional Deficits. [x] Progressing: [] Met: [x] Not Met: [] Adjusted  3. Patient will demonstrate an increase in  L shoulder strength to 4/5 flex, abd, ER in UE to allow for proper functional mobility as indicated by patients Functional Deficits. [x] Progressing: [] Met: [x] Not Met: [] Adjusted  4. Patient will return to ADLs above shoulder height without increased symptoms or restriction. [x] Progressing: [] Met: [x] Not Met: [] Adjusted  5. 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 [x] Progressing: [] Met: [x] Not Met: [] Adjusted         Overall Progression Towards Functional goals/ Treatment Progress Update:  [x] Patient is progressing as expected towards functional goals listed. [] Progression is slowed due to complexities/Impairments listed. [] Progression has been slowed due to co-morbidities. [] Plan just implemented, too soon to assess goals progression <30days   [] Goals require adjustment due to lack of progress  [] Patient is not progressing as expected and requires additional follow up with physician  [] Other    Prognosis for POC: [x] Good [] Fair  [] Poor      Patient requires continued skilled intervention: [x] Yes  [] No      ASSESSMENT:    Treatment/Activity Tolerance:  [x] Patient tolerated treatment well [] Patient limited by fatique  [] Patient limited by pain  [] Patient limited by other medical complications  [x] Other: Pt continues to improve in PROM/AAROM of shoulder showing good motion for his time post op. He was tight in IR ROM stretch which was just started today.  Since he is doing well, will decrease to just 1x this week while doing HEP between visit. Pt will continue to benefit from skilled PT 1-2x/week to progress ROM and total arm and scapular strengthening in this next phase of rehab per protocol. Return to Play: (if applicable)   []  Stage 1: Intro to Strength   []  Stage 2: Return to Run and Strength   []  Stage 3: Return to Jump and Strength   []  Stage 4: Dynamic Strength and Agility   []  Stage 5: Sport Specific Training   []  Ready to Return to Play, Meets All Above Stages   []  Not Ready for Return to Sports   Comments:            Prognosis: [x] Good [] Fair  [] Poor    Patient Requires Follow-up: [x] Yes  [] No    PLAN: 1-2x/week for 6 weeks; consider BB IR  [x] Continue per plan of care [] Alter current plan (see comments above)  [] Plan of care initiated [] Hold pending MD visit [] Discharge    Note: If patient does not return for scheduled/ recommended follow up visits, this note will serve as a discharge from care along with most recent update on progress.      Electronically signed by: Summer Machuca, PT, DPT

## 2022-09-09 ENCOUNTER — APPOINTMENT (OUTPATIENT)
Dept: PHYSICAL THERAPY | Age: 62
End: 2022-09-09
Payer: COMMERCIAL

## 2022-09-13 ENCOUNTER — HOSPITAL ENCOUNTER (OUTPATIENT)
Dept: PHYSICAL THERAPY | Age: 62
Setting detail: THERAPIES SERIES
Discharge: HOME | End: 2022-09-13
Payer: COMMERCIAL

## 2022-09-13 PROCEDURE — 97110 THERAPEUTIC EXERCISES: CPT | Performed by: PHYSICAL THERAPIST

## 2022-09-13 PROCEDURE — 97530 THERAPEUTIC ACTIVITIES: CPT | Performed by: PHYSICAL THERAPIST

## 2022-09-13 NOTE — FLOWSHEET NOTE
[address] [facility],[address], [place] [address] [address] [facility], [place], [address]   Phone: [phone]   Fax: [phone]                                                       Physical Therapy Daily Treatment Note  Date:  2022    Patient Name:  Dusty Gibson    :  1960  MRN: [id number]    Medical/Treatment Diagnosis Information:  Diagnosis: s/p L shoulder rotator cuff repair and bicep tenodesis on 22; S46.012D (ICD-10-CM) - Traumatic tear of left rotator cuff, unspecified tear extent, subsequent encounter  Treatment Diagnosis: M25.512 (L) shoulder pain; M62.81 muscle weakness  Insurance/Certification information:  PT Insurance Information: [employer]- 50 visits, no auth, $30 copay  Physician Information:   Dr. Karyle Pencil  Has the plan of care been signed (Y/N):        []  Yes  []  No     Date of Patient follow up with Physician: 10/6/22      Is this a Progress Report:     [x]  Yes  []  No        If Yes:  Date Range for reporting period:  Beginning- 2022   Ending-22    Progress report will be due (10 Rx or 30 days whichever is less): 64      Recertification will be due (POC Duration  / 90 days whichever is less): as above        Visit # Insurance Allowable Auth Required   8 50 []  Yes [x]  No        Functional Scale: FOTO- 40    Date assessed:  2022      Latex Allergy:  [x]NO      []YES  Preferred Language for Healthcare:   [x]English       []other:      Pain level:  0/10  on 2022    SUBJECTIVE:  pt is 9 weeks post op. Pt says shoulder is feeling good. Exercises going well.  Feels most stretch with new IR stretch      OBJECTIVE:   Observation:   Test measurements:  22:     CERV ROM       Cervical Flexion       Cervical Extension       Cervical SB       Cervical rotation                     ROM PROM AROM-  Comment     L-  R L R     Flexion           Abduction           ER at side  88  cane ER at 80 abd  93           BB IR             IR at 80 abd  53 at 90 abd           Other             Other                    Strength- deferred due to recent surgery L R Comment   Flexion         Abduction         ER         IR         Supraspinatus         Upper Trap         Lower Trap         Mid Trap         Rhomboids         Biceps         Triceps         Horizontal Abduction         Horizontal Adduction         Lats            Orthopedic Special Tests: deferred  Special Tests Left Right   Apley Scratch IR:  ER:   Cross body: IR:  ER:  Cross Body:   Neer's       Full Can       Empty Can       Orlandoleatha Maggy       Nerve Tension Testing       Speed's       Osuna's        Spurling's       Repeated Scaption                        Reflexes/Sensation (myotomes/dermatomes): n/t     Joint mobility: n/t              []Normal                       []Hypo              []Hyper     Palpation: tender over supero-lateral portal     Functional Mobility/Transfers: Indep     Posture: fair     Bandages/Dressings/Incisions:  mild tightness anterior and superior incision. Gait: (include devices/WB status) Indep    RESTRICTIONS/PRECAUTIONS: HTN, HLD; he also has some back pain also. Carpal tunnel L UE.      Exercises/Interventions:     Exercise/Equipment Resistance/Repetitions Other comments   Aerobic Conditioning     Aerodyne          Stretching/PROM     ER supine 10 x 10 secs at side and 90 abd    Cane ER- seated at side     Wand flex 10 x 5 secs     Towel press up     Passive shoulder flex with opposite UE assist     Ball roll on table  H abd/add green x 30 each    Ball on wall for shoulder flex     Elbow PROM    Side-lying flex     Table Slides    Wall slides  5x 10 secs flex;  10 x 10 secs abd    UE [place] X 30  flex    Pulleys 1   Pendulum hold/forward bow     BB IR 10 x 10 secs vertical    SL IR 10x 10 secs    Pec doorway stretch     CBA stretch     UT stretch Shown for HEP    LS stretch Shown for HEP    Isometrics Retraction        Weight shift     Flexion     Abduction     External Rotation     Internal Rotation     Biceps     Triceps          PRE's     Flexion     Abduction     External Rotation     Internal Rotation     Shrugs 3 x 10 6 lbs    Prone EXT 2 x 10 hold 3 secs    Reverse Flys- prone Ts     Prone Ys     Bent over row 3 x 10 7 lbs    Serratus 3 x 10     Horizontal Abd with ER     Biceps 3 x 10 6 lbs    Triceps     Retraction     Supination/pronation    Wrist flex/extn     External rotation in abduction (hitch hiker)     Cable Column/Theraband     External rotation to upper cut isometric     External Rotation     Internal Rotation     Shrugs     Lats     Ext     Flex     Scapular Retraction     BIC     TRIC 3 x 10 orange TB    PNF - D2 flex     PNF- D2 extn     [place] flexion/deltoid retraining     Dynamic Stability     Ball on wall     Push ups on wall     Push ups on ball on wall     90/90 ball taps     Horizontal abd ball taps     120 overhead ball taps (Y position)     Plyoback          Manual treatment:                           Patient education: Pt was educated on PT diagnosis, prognosis, and plan of care. Pt was educated on the use of ice throughout the day. Pt educated on doing light scar massage at incisions to decrease tightness. Pt was educated on signs/symptoms of infection and to call with any questions or concerns. Pt educated on post-op dressings, donning/doffing sling, dressing, precautions and restrictions.      Therapeutic Exercise and NMR EXR  [x] (73132) Provided verbal/tactile cueing for activities related to strengthening, flexibility, endurance, ROM  for improvements in scapular, scapulothoracic and UE control with self care, reaching, carrying, lifting, house/yardwork, driving/computer work.    [] (03308) Provided verbal/tactile cueing for activities related to improving balance, coordination, kinesthetic sense, posture, motor skill, proprioception  to assist with  scapular, scapulothoracic and UE control with self care, reaching, carrying, lifting, house/yardwork, driving/computer work. Therapeutic Activities:    [x] (52277 or 71007) Provided verbal/tactile cueing for activities related to improving balance, coordination, kinesthetic sense, posture, motor skill, proprioception and motor activation to allow for proper function of scapular, scapulothoracic and UE control with self care, carrying, lifting, driving/computer work. Home Exercise Program:    [x] (76681) Reviewed/Progressed HEP activities related to strengthening, flexibility, endurance, ROM of scapular, scapulothoracic and UE control with self care, reaching, carrying, lifting, house/yardwork, driving/computer work  [] (51845) Reviewed/Progressed HEP activities related to improving balance, coordination, kinesthetic sense, posture, motor skill, proprioception of scapular, scapulothoracic and UE control with self care, reaching, carrying, lifting, house/yardwork, driving/computer work    Access Code: [email or web address]  URL: "[employer]"/  Date: 09/02/2022  Prepared by: Ainsley Liz    Exercises  Seated Shoulder Flexion AAROM with Pulley Behind - 2 x daily - 7 x weekly - 10 reps - 1 sets - 10 hold  Seated Shoulder Scaption AAROM with Pulley at Side - 2 x daily - 7 x weekly - 10 reps - 1 sets - 10 hold  Scaption Wall Slide with Towel - 2 x daily - 7 x weekly - 10 reps - 1 sets - 10 hold  Supine Shoulder External Rotation with Dowel - 2 x daily - 7 x weekly - 10 reps - 1 sets - 10 hold  Supine Shoulder External Rotation in Scaption AAROM - 2 x daily - 7 x weekly - 10 reps - 1 sets - 10 hold  Supine Shoulder Flexion with Dowel - 2 x daily - 7 x weekly - 10 reps - 1 sets - 10 hold  Supine Scapular Protraction in Flexion with Dumbbells - 1 x daily - 7 x weekly - 3 sets - 10 reps  Standing Bicep Curls Supinated with Dumbbells - 1 x daily - 7 x weekly - 3 sets - 10 reps  Seated Shoulder Shrugs - 1 x daily - 7 x weekly - 3 sets - 10 reps  Standing Tricep Extensions with Resistance - 1 x daily - 7 x weekly - 3 sets - 10 reps  Seated Scapular Retraction - 2 x daily - 7 x weekly - 1 sets - 10 reps - 10 hold    Access Code: [email or web address]  URL: [email or web address]  Date: 09/06/2022  Prepared by: Maynor Grissom    Exercises  Standing Shoulder Abduction Wall Slide with Thumb Out - 2 x daily - 7 x weekly - 1 sets - 10 reps - 10 hold  Sleeper Stretch - 2 x daily - 7 x weekly - 10 reps - 1 sets - 10 hold  Access Code: [email or web address]  URL: [email or web address]  Date: 09/13/2022  Prepared by: Maynor Grissom    Exercises  Standing Shoulder Internal Rotation Stretch with Towel - 2 x daily - 7 x weekly - 10 reps - 1 sets - 10 hold      Manual Treatments:  PROM / STM / Oscillations-Mobs:  G-I, II, III, IV (PA's, Inf., Post.)  [] (41401) Provided manual therapy to mobilize soft tissue/joints of cervical/CT, scapular GHJ and UE for the purpose of modulating pain, promoting relaxation,  increasing ROM, reducing/eliminating soft tissue swelling/inflammation/restriction, improving soft tissue extensibility and allowing for proper ROM for normal function with self care, reaching, carrying, lifting, house/yardwork, driving/computer work    Modalities: will ice on way home  Charges:  Timed Code Treatment Minutes: 40   Total Treatment Minutes: 42     [] EVAL (LOW) 62767   [] EVAL (MOD) 23102   [] EVAL (HIGH) 66194   [] RE-EVAL   [x] AY(64042) x  2   [] IONTO  [] NMR (15972) x     [] VASO  [] Manual (42050) x      [] Other:  [x] TA x1      [] Mech Traction (34152)  [] ES(attended) (09852)      [] ES (un) (08036):       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 Independent in HEP and progression per patient tolerance, in order to prevent re-injury. [] Progressing: [x] Met: [] Not Met: [] Adjusted   2.  Patient will have a decrease in pain to facilitate improvement in movement, function, and ADLs as indicated by Functional Deficits. [] Progressing: [x] Met: [] Not Met: [] Adjusted     Long Term Goals: To be achieved in: 5-6 months  1. Functional index score of 65% or more for the FOTO to assist with reaching prior level of function. [x] Progressing: [] Met: [x] Not Met: [] Adjusted  2. Patient will demonstrate increased L shoulder AROM to  160 flex, 170 abd, and ER at 90 to 90 degrees to allow for proper joint functioning as indicated by patients Functional Deficits. [x] Progressing: [] Met: [x] Not Met: [] Adjusted  3. Patient will demonstrate an increase in  L shoulder strength to 4/5 flex, abd, ER in UE to allow for proper functional mobility as indicated by patients Functional Deficits. [x] Progressing: [] Met: [x] Not Met: [] Adjusted  4. Patient will return to ADLs above shoulder height without increased symptoms or restriction. [x] Progressing: [] Met: [x] Not Met: [] Adjusted  5. 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 [x] Progressing: [] Met: [x] Not Met: [] Adjusted         Overall Progression Towards Functional goals/ Treatment Progress Update:  [x] Patient is progressing as expected towards functional goals listed. [] Progression is slowed due to complexities/Impairments listed. [] Progression has been slowed due to co-morbidities.   [] Plan just implemented, too soon to assess goals progression <30days   [] Goals require adjustment due to lack of progress  [] Patient is not progressing as expected and requires additional follow up with physician  [] Other    Prognosis for POC: [x] Good [] Fair  [] Poor      Patient requires continued skilled intervention: [x] Yes  [] No      ASSESSMENT:    Treatment/Activity Tolerance:  [x] Patient tolerated treatment well [] Patient limited by fatique  [] Patient limited by pain  [] Patient limited by other medical complications  [x] Other:

## 2022-09-16 ENCOUNTER — APPOINTMENT (OUTPATIENT)
Dept: PHYSICAL THERAPY | Age: 62
End: 2022-09-16
Payer: COMMERCIAL

## 2022-09-23 ENCOUNTER — HOSPITAL ENCOUNTER (OUTPATIENT)
Dept: PHYSICAL THERAPY | Age: 62
Setting detail: THERAPIES SERIES
Discharge: HOME | End: 2022-09-23
Payer: COMMERCIAL

## 2022-09-23 PROCEDURE — 97110 THERAPEUTIC EXERCISES: CPT | Performed by: PHYSICAL THERAPIST

## 2022-09-23 PROCEDURE — 97530 THERAPEUTIC ACTIVITIES: CPT | Performed by: PHYSICAL THERAPIST

## 2022-09-23 NOTE — FLOWSHEET NOTE
L R     Flexion           Abduction           ER at side  88  cane           ER at 90 abd  94           BB IR             IR at 90 abd            Other             Other                    Strength- deferred due to recent surgery L R Comment   Flexion         Abduction         ER         IR         Supraspinatus         Upper Trap         Lower Trap         Mid Trap         Rhomboids         Biceps         Triceps         Horizontal Abduction         Horizontal Adduction         Lats            Orthopedic Special Tests: deferred  Special Tests Left Right   Apley Scratch IR:  ER:   Cross body: IR:  ER:  Cross Body:   Neer's       Full Can       Empty Can       Ladene Peoples       Nerve Tension Testing       Speed's       Osuna's        Spurling's       Repeated Scaption                        Reflexes/Sensation (myotomes/dermatomes): n/t     Joint mobility: n/t              []Normal                       []Hypo              []Hyper     Palpation: tender over supero-lateral portal     Functional Mobility/Transfers: Indep     Posture: fair     Bandages/Dressings/Incisions:  mild tightness anterior and superior incision. Gait: (include devices/WB status) Indep    RESTRICTIONS/PRECAUTIONS: HTN, HLD; he also has some back pain also. Carpal tunnel L UE.      Exercises/Interventions:     Exercise/Equipment Resistance/Repetitions Other comments   Aerobic Conditioning     Aerodyne          Stretching/PROM     ER supine 10 x 10 secs at side and 90 abd    Cane ER- seated at side     Wand flex    Towel press up     Passive shoulder flex with opposite UE assist     Ball roll on table     Ball on wall for shoulder flex X10 then increased clicking in shoulder so held    Elbow PROM    Side-lying flex     Table Slides    Wall slides  5x 10 secs flex;  10 x 10 secs abd    UE [place] X 30  flex    Pulleys 1   Pendulum hold/forward bow     BB IR 10 x 10 secs vertical    SL IR 10x 10 secs    Pec doorway stretch     CBA stretch     UT stretch Shown for HEP    LS stretch Shown for HEP    Isometrics     Retraction        Weight shift     Flexion     Abduction 10 x 10 secs submax    External Rotation     Internal Rotation     Biceps     Triceps          PRE's     Flexion     Abduction     External Rotation     Internal Rotation     Shrugs 3 x 10 7 lbs    Prone EXT 2 x 10 hold 3 secs    Reverse Flys- prone Ts     Prone Ys     Bent over row 3 x 10 7 lbs    Serratus 3 x 10 4 lb    Horizontal Abd with ER     Biceps 3 x 10 7 lbs    Triceps     Retraction     Supination/pronation    Wrist flex/extn     External rotation in abduction (hitch hiker)     Cable Column/Theraband     External rotation to upper cut isometric     External Rotation     Internal Rotation     Shrugs     Lats     Ext     Flex     Scapular Retraction     BIC     TRIC 3 x 10 blue TB    PNF - D2 flex     PNF- D2 extn     [place] flexion/deltoid retraining     Dynamic Stability     Ball on wall     Push ups on wall     Push ups on ball on wall     90/90 ball taps     Horizontal abd ball taps     120 overhead ball taps (Y position)     Plyoback          Manual treatment:                           Patient education: Pt was educated on PT diagnosis, prognosis, and plan of care. Pt was educated on the use of ice throughout the day. Pt educated on doing light scar massage at incisions to decrease tightness. Pt was educated on signs/symptoms of infection and to call with any questions or concerns. Pt educated on post-op dressings, donning/doffing sling, dressing, precautions and restrictions.      Therapeutic Exercise and NMR EXR  [x] (39957) Provided verbal/tactile cueing for activities related to strengthening, flexibility, endurance, ROM  for improvements in scapular, scapulothoracic and UE control with self care, reaching, carrying, lifting, house/yardwork, driving/computer work.    [] (09631) Provided verbal/tactile cueing for activities related to improving balance, coordination, kinesthetic sense, posture, motor skill, proprioception  to assist with  scapular, scapulothoracic and UE control with self care, reaching, carrying, lifting, house/yardwork, driving/computer work. Therapeutic Activities:    [x] (33813 or 91264) Provided verbal/tactile cueing for activities related to improving balance, coordination, kinesthetic sense, posture, motor skill, proprioception and motor activation to allow for proper function of scapular, scapulothoracic and UE control with self care, carrying, lifting, driving/computer work. Home Exercise Program:    [x] (54826) Reviewed/Progressed HEP activities related to strengthening, flexibility, endurance, ROM of scapular, scapulothoracic and UE control with self care, reaching, carrying, lifting, house/yardwork, driving/computer work  [] (30227) Reviewed/Progressed HEP activities related to improving balance, coordination, kinesthetic sense, posture, motor skill, proprioception of scapular, scapulothoracic and UE control with self care, reaching, carrying, lifting, house/yardwork, driving/computer work    Access Code: [email or web address]  URL: [email or web address]  Date: 09/02/2022  Prepared by: Dinorah Diaz    Exercises  Seated Shoulder Flexion AAROM with Pulley Behind - 2 x daily - 7 x weekly - 10 reps - 1 sets - 10 hold  Seated Shoulder Scaption AAROM with Pulley at Side - 2 x daily - 7 x weekly - 10 reps - 1 sets - 10 hold  Scaption Wall Slide with Towel - 2 x daily - 7 x weekly - 10 reps - 1 sets - 10 hold  Supine Shoulder External Rotation with Dowel - 2 x daily - 7 x weekly - 10 reps - 1 sets - 10 hold  Supine Shoulder External Rotation in Scaption AAROM - 2 x daily - 7 x weekly - 10 reps - 1 sets - 10 hold  Supine Shoulder Flexion with Dowel - 2 x daily - 7 x weekly - 10 reps - 1 sets - 10 hold  Supine Scapular Protraction in Flexion with Dumbbells - 1 x daily - 7 x weekly - 3 sets - 10 reps  Standing Bicep Curls Supinated with Dumbbells - 1 x daily - 7 x weekly - 3 sets - 10 reps  Seated Shoulder Shrugs - 1 x daily - 7 x weekly - 3 sets - 10 reps  Standing Tricep Extensions with Resistance - 1 x daily - 7 x weekly - 3 sets - 10 reps  Seated Scapular Retraction - 2 x daily - 7 x weekly - 1 sets - 10 reps - 10 hold    Access Code: [email or web address]  URL: [email or web address]  Date: 09/06/2022  Prepared by: Thedora Castellani    Exercises  Standing Shoulder Abduction Wall Slide with Thumb Out - 2 x daily - 7 x weekly - 1 sets - 10 reps - 10 hold  Sleeper Stretch - 2 x daily - 7 x weekly - 10 reps - 1 sets - 10 hold  Access Code: [email or web address]  URL: [email or web address]  Date: 09/13/2022  Prepared by: Thedora Castellani    Exercises  Standing Shoulder Internal Rotation Stretch with Towel - 2 x daily - 7 x weekly - 10 reps - 1 sets - 10 hold  Access Code: [email or web address]  URL: [email or web address]/  Date: 09/23/2022  Prepared by: Thedora Castellani    Exercises  Isometric Shoulder Abduction at Wall - 1 x daily - 7 x weekly - 1 sets - 10 reps - 10 hold      Manual Treatments:  PROM / STM / Oscillations-Mobs:  G-I, II, III, IV (PA's, Inf., Post.)  [] (24009) Provided manual therapy to mobilize soft tissue/joints of cervical/CT, scapular GHJ and UE for the purpose of modulating pain, promoting relaxation,  increasing ROM, reducing/eliminating soft tissue swelling/inflammation/restriction, improving soft tissue extensibility and allowing for proper ROM for normal function with self care, reaching, carrying, lifting, house/yardwork, driving/computer work    Modalities: will ice on way home  Charges:  Timed Code Treatment Minutes: 38   Total Treatment Minutes: 38     [] EVAL (LOW) 89772   [] EVAL (MOD) 50186   [] EVAL (HIGH) 46825   [] RE-EVAL   [x] OZ(67534) x  2   [] IONTO  [] NMR (27161) x     [] VASO  [] Manual (60832) x      [] Other:  [x] TA x1      [] Mech Traction (77587)  [] ES(attended) (59019)      [] ES (un) (19878):       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 Independent in HEP and progression per patient tolerance, in order to prevent re-injury. [] Progressing: [x] Met: [] Not Met: [] Adjusted   2. Patient will have a decrease in pain to facilitate improvement in movement, function, and ADLs as indicated by Functional Deficits. [] Progressing: [x] Met: [] Not Met: [] Adjusted     Long Term Goals: To be achieved in: 5-6 months  1. Functional index score of 65% or more for the FOTO to assist with reaching prior level of function. [x] Progressing: [] Met: [x] Not Met: [] Adjusted  2. Patient will demonstrate increased L shoulder AROM to  160 flex, 170 abd, and ER at 90 to 90 degrees to allow for proper joint functioning as indicated by patients Functional Deficits. [x] Progressing: [] Met: [x] Not Met: [] Adjusted  3. Patient will demonstrate an increase in  L shoulder strength to 4/5 flex, abd, ER in UE to allow for proper functional mobility as indicated by patients Functional Deficits. [x] Progressing: [] Met: [x] Not Met: [] Adjusted  4. Patient will return to ADLs above shoulder height without increased symptoms or restriction. [x] Progressing: [] Met: [x] Not Met: [] Adjusted  5. 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 [x] Progressing: [] Met: [x] Not Met: [] Adjusted         Overall Progression Towards Functional goals/ Treatment Progress Update:  [x] Patient is progressing as expected towards functional goals listed. [] Progression is slowed due to complexities/Impairments listed. [] Progression has been slowed due to co-morbidities.   [] Plan just implemented, too soon to assess goals progression <30days   [] Goals require adjustment due to lack of progress  [] Patient is not progressing as expected and requires additional follow up with physician  [] Other    Prognosis for POC: [x] Good [] Buddy Carvalho  [] Poor      Patient requires continued skilled intervention: [x] Yes  [] No      ASSESSMENT:    Treatment/Activity Tolerance:  [x] Patient tolerated treatment well [] Patient limited by fatique  [] Patient limited by pain  [] Patient limited by other medical complications  [x] Other: pt educated not to hold board for his grandson at [employer] for kicking against. Pt continues to progress well with his ROM, showing nearly full PROM and he is pain-free. Pt tolerated light progression of bicep and scapular strengthening well today. Tolerated submax abd isometric well today also but could tell was fatigued in shoulder with this. Return to Play: (if applicable)   []  Stage 1: Intro to Strength   []  Stage 2: Return to Run and Strength   []  Stage 3: Return to Jump and Strength   []  Stage 4: Dynamic Strength and Agility   []  Stage 5: Sport Specific Training   []  Ready to Return to Play, Meets All Above Stages   []  Not Ready for Return to Sports   Comments:            Prognosis: [x] Good [] Fair  [] Poor    Patient Requires Follow-up: [x] Yes  [] No    PLAN: 1-2x/week for 6 weeks;progress note before MD appt, initiate [facility] flex, IR iso  [x] Continue per plan of care [] Alter current plan (see comments above)  [] Plan of care initiated [] Hold pending MD visit [] Discharge    Note: If patient does not return for scheduled/ recommended follow up visits, this note will serve as a discharge from care along with most recent update on progress.      Electronically signed by: Lei Ordonez, PT, DPT

## 2022-09-30 ENCOUNTER — HOSPITAL ENCOUNTER (OUTPATIENT)
Dept: PHYSICAL THERAPY | Age: 62
Setting detail: THERAPIES SERIES
Discharge: HOME | End: 2022-09-30
Payer: COMMERCIAL

## 2022-09-30 PROCEDURE — 97110 THERAPEUTIC EXERCISES: CPT | Performed by: PHYSICAL THERAPIST

## 2022-09-30 PROCEDURE — 97112 NEUROMUSCULAR REEDUCATION: CPT | Performed by: PHYSICAL THERAPIST

## 2022-09-30 PROCEDURE — 97530 THERAPEUTIC ACTIVITIES: CPT | Performed by: PHYSICAL THERAPIST

## 2022-09-30 NOTE — PLAN OF CARE
[address] and [address] [place], [place] [address] [place] [address], [address] [place], [place] [address]  Phone: [phone]   Fax: [phone]                                                     Physical Therapy Re-Certification Plan of Olga Zamudio      Dear  Dr. Ck Bazan,    We had the pleasure of treating the following patient for physical therapy services at [address] [place] [address]. A summary of our findings can be found in the updated assessment below. This includes our plan of care. If you have any questions or concerns regarding these findings, please do not hesitate to contact me at the office phone number checked above. Thank you for the referral.     Physician Signature:________________________________Date:__________________  By signing above (or electronic signature), therapists plan is approved by physician  Total Visits to Date: 10  Overall Response to Treatment:   [x]Patient is responding well to treatment and improvement is noted with regards  to goals   []Patient should continue to improve in reasonable time if they continue HEP   []Patient has plateaued and is no longer responding to skilled PT intervention    []Patient is getting worse and would benefit from return to referring MD   []Patient unable to adhere to initial POC   [x]Other: pt is doing well post op RCR. He shows good shoulder AROM with mild limit still in BB IR and SL IR motions. He has been tolerating therapy well. Pt is having less pain and soreness with movements and ADLs throughout day but has been limiting use for heavy activities or lifting weight away from body as instructed. 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      Physical Therapy Daily Treatment Note  Date:  2022    Patient Name:  Jesika Bragg    :  1960  MRN: [id number]    Medical/Treatment Diagnosis Information:  Diagnosis: s/p L shoulder rotator cuff repair and bicep tenodesis on 22; S46.012D (ICD-10-CM) - Traumatic tear of left rotator cuff, unspecified tear extent, subsequent encounter  Treatment Diagnosis: M25.512 (L) shoulder pain; M62.81 muscle weakness  Insurance/Certification information:  PT Insurance Information: [employer]- 50 visits, no auth, $30 copay  Physician Information:   Dr. Love Montemayor  Has the plan of care been signed (Y/N):        [x]  Yes  []  No     Date of Patient follow up with Physician: 10/6/22      Is this a Progress Report:     [x]  Yes  []  No        If Yes:  Date Range for reporting period:  Beginning- 2022   Ending-22    Progress report will be due (10 Rx or 30 days whichever is less):       Recertification will be due (POC Duration  / 90 days whichever is less): as above        Visit # Insurance Allowable Auth Required   10 50 []  Yes [x]  No        Functional Scale: FOTO- 62    Date assessed:  2022      Latex Allergy:  [x]NO      []YES  Preferred Language for Healthcare:   [x]English       []other:      Pain level:  0/10  on 2022    SUBJECTIVE:  pt is 11 weeks post op. Pt is feeling good. Felt fine after last session. Was initially a little sore with abd isometric but getting better now. He reports when lifting arms overhead his L shoulder feels better than his R now.        OBJECTIVE:   Observation:   Test measurements:     CERV ROM       Cervical Flexion       Cervical Extension       Cervical SB       Cervical rotation                     ROM PROM AROM-22  Comment     L-  R L R     Flexion    154  155     Abduction    169  165     ER at side    96  96     ER at 90 abd    92  102     BB IR      T11  T9     IR at 90 abd     48  60     Other             Other                    Strength- 22 L R Comment   Flexion    4+ Abduction    4     ER  4- limited resistance  4+     IR  5  5     Supraspinatus         Upper Trap         Lower Trap         Mid Trap         Rhomboids         Biceps  4+  5     Triceps  5  5     Horizontal Abduction         Horizontal Adduction         Lats            Orthopedic Special Tests: deferred  Special Tests Left Right   Apley Scratch IR:  ER:   Cross body: IR:  ER:  Cross Body:   Neer's       Full Can       Empty Can       Viral Kub       Nerve Tension Testing       Speed's       Osuna's        Spurling's       Repeated Scaption                        Reflexes/Sensation (myotomes/dermatomes): n/t     Joint mobility: n/t              []Normal                       []Hypo              []Hyper     Palpation:      Functional Mobility/Transfers: Indep     Posture: fair     Bandages/Dressings/Incisions:  healing well  Gait: (include devices/WB status) Indep    RESTRICTIONS/PRECAUTIONS: HTN, HLD; he also has some back pain also. Carpal tunnel L UE.      Exercises/Interventions:     Exercise/Equipment Resistance/Repetitions Other comments   Aerobic Conditioning     Aerodyne          Stretching/PROM     ER supine 1   Cane ER- seated at side     Wand flex    Towel press up     Passive shoulder flex with opposite UE assist     Ball roll on table     Ball on wall for shoulder flex 3X10 green SB    Elbow PROM    Side-lying flex     Table Slides    Wall slides  5x 10 secs flex;  10 x 10 secs abd    UE [place]    Pulleys 1   Pendulum hold/forward bow     BB IR 10 x 10 secs vertical    SL IR 10x 10 secs    Pec doorway stretch     CBA stretch     UT stretch Shown for HEP    LS stretch [place]own for HEP    Isometrics     Retraction        Weight shift     Flexion 10 x 10 secs submax    Abduction 10 x 10 secs submax    External Rotation     Internal Rotation 10 x 10 secs submax    Biceps     Triceps          PRE's     Flexion     Abduction     External Rotation     Internal Rotation     Shrugs    Prone EXT Reverse Flys- prone Ts     Prone Ys     Bent over row    Serratus 3 x 10 5 lb    Horizontal Abd with ER     Biceps 3 x 10 8 lbs    Triceps     Retraction     Supination/pronation    Wrist flex/extn     External rotation in abduction (hitch hiker)     Cable Column/Theraband     External rotation to upper cut isometric     External Rotation     Internal Rotation     Shrugs     Lats     Ext     Flex     Scapular Retraction 2x 10 green TB    BIC     TRIC 3 x 10 blue TB    PNF - D2 flex     PNF- D2 extn     [place] flexion/deltoid retraining 3 mins supine, full arc    Dynamic Stability     Ball on wall     Push ups on wall     Push ups on ball on wall     90/90 ball taps     Horizontal abd ball taps     120 overhead ball taps (Y position)     Plyoback          Manual treatment:                           Patient education: Pt was educated on PT diagnosis, prognosis, and plan of care. Pt was educated on the use of ice throughout the day. Pt educated on doing light scar massage at incisions to decrease tightness. Pt was educated on signs/symptoms of infection and to call with any questions or concerns. Pt educated on post-op dressings, donning/doffing sling, dressing, precautions and restrictions. Therapeutic Exercise and NMR EXR  [x] (17480) Provided verbal/tactile cueing for activities related to strengthening, flexibility, endurance, ROM  for improvements in scapular, scapulothoracic and UE control with self care, reaching, carrying, lifting, house/yardwork, driving/computer work.    [] (20169) Provided verbal/tactile cueing for activities related to improving balance, coordination, kinesthetic sense, posture, motor skill, proprioception  to assist with  scapular, scapulothoracic and UE control with self care, reaching, carrying, lifting, house/yardwork, driving/computer work.     Therapeutic Activities:    [x] (04784 or 82767) Provided verbal/tactile cueing for activities related to improving balance, coordination, kinesthetic sense, posture, motor skill, proprioception and motor activation to allow for proper function of scapular, scapulothoracic and UE control with self care, carrying, lifting, driving/computer work. Home Exercise Program:    [x] (99090) Reviewed/Progressed HEP activities related to strengthening, flexibility, endurance, ROM of scapular, scapulothoracic and UE control with self care, reaching, carrying, lifting, house/yardwork, driving/computer work  [] (23745) Reviewed/Progressed HEP activities related to improving balance, coordination, kinesthetic sense, posture, motor skill, proprioception of scapular, scapulothoracic and UE control with self care, reaching, carrying, lifting, house/yardwork, driving/computer work    Access Code: [email or web address]  URL: [email or web address]  Date: 09/30/2022  Prepared by: Cherelle Pathak    Exercises  Scaption Wall Slide with Towel - 1 x daily - 7 x weekly - 10 reps - 1 sets - 10 hold  Standing Shoulder Abduction Wall Slide with Thumb Out - 1 x daily - 7 x weekly - 1 sets - 10 reps - 10 hold  Sleeper Stretch - 2 x daily - 7 x weekly - 10 reps - 1 sets - 10 hold  Standing Shoulder Internal Rotation Stretch with Dowel - 2 x daily - 7 x weekly - 1 sets - 10 reps - 10 hold  Supine Shoulder External Rotation in Scaption AAROM - 1 x daily - 2 x weekly - 10 reps - 1 sets - 10 hold  Supine Scapular Protraction in Flexion with Dumbbells - 1 x daily - 7 x weekly - 3 sets - 10 reps  Scapular Retraction with Resistance - 1 x daily - 7 x weekly - 3 sets - 10 reps  Isometric Shoulder Flexion at Wall - 1 x daily - 7 x weekly - 1 sets - 10 reps - 10 hold  Isometric Shoulder Abduction at Wall - 1 x daily - 7 x weekly - 1 sets - 10 reps - 10 hold  Standing Isometric Shoulder Internal Rotation at Doorway - 1 x daily - 7 x weekly - 10 reps - 1 sets - 10 hold  Supine Shoulder Flexion Extension Full Range AROM - 1 x daily - 7 x weekly - 1 reps - 1 sets  Standing Bicep Curls Supinated with Dumbbells - 1 x daily - 3 x weekly - 3 sets - 10 reps  Standing Tricep Extensions with Resistance - 1 x daily - 3 x weekly - 3 sets - 10 reps        Manual Treatments:  PROM / STM / Oscillations-Mobs:  G-I, II, III, IV (PA's, Inf., Post.)  [] (40934) Provided manual therapy to mobilize soft tissue/joints of cervical/CT, scapular GHJ and UE for the purpose of modulating pain, promoting relaxation,  increasing ROM, reducing/eliminating soft tissue swelling/inflammation/restriction, improving soft tissue extensibility and allowing for proper ROM for normal function with self care, reaching, carrying, lifting, house/yardwork, driving/computer work    Modalities: will ice on way home  Charges:  Timed Code Treatment Minutes: 44   Total Treatment Minutes: 46     [] EVAL (LOW) 70850   [] EVAL (MOD) 30216   [] EVAL (HIGH) 07814   [] RE-EVAL   [x] SW(27027) x  1  [] IONTO  [x] NMR (40685) x  1   [] VASO  [] Manual (79731) x      [] Other:  [x] TA x1      [] Mech Traction (20427)  [] ES(attended) (22305)      [] ES (un) (59479):       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 Independent in HEP and progression per patient tolerance, in order to prevent re-injury. [] Progressing: [x] Met: [] Not Met: [] Adjusted   2. Patient will have a decrease in pain to facilitate improvement in movement, function, and ADLs as indicated by Functional Deficits. [] Progressing: [x] Met: [] Not Met: [] Adjusted     Long Term Goals: To be achieved in: 5-6 months  1. Functional index score of 65% or more for the FOTO to assist with reaching prior level of function. [x] Progressing: [] Met: [x] Not Met: [] Adjusted  2. Patient will demonstrate increased L shoulder AROM to  155 flex, 170 abd, and ER at 90 to 90 degrees to allow for proper joint functioning as indicated by patients Functional Deficits.     [x] Progressing: [] Met: [x] Not Met: [] Adjusted  3. Patient will demonstrate an increase in  L shoulder strength to 4/5 flex, abd, ER in UE to allow for proper functional mobility as indicated by patients Functional Deficits. [x] Progressing: [] Met: [x] Not Met: [] Adjusted  4. Patient will return to ADLs above shoulder height without increased symptoms or restriction. [x] Progressing: [] Met: [x] Not Met: [] Adjusted  5. 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 [x] Progressing: [] Met: [x] Not Met: [] Adjusted         Overall Progression Towards Functional goals/ Treatment Progress Update:  [x] Patient is progressing as expected towards functional goals listed. [] Progression is slowed due to complexities/Impairments listed. [] Progression has been slowed due to co-morbidities. [] Plan just implemented, too soon to assess goals progression <30days   [] Goals require adjustment due to lack of progress  [] Patient is not progressing as expected and requires additional follow up with physician  [] Other    Prognosis for POC: [x] Good [] Fair  [] Poor      Patient requires continued skilled intervention: [x] Yes  [] No      ASSESSMENT:    Treatment/Activity Tolerance:  [x] Patient tolerated treatment well [] Patient limited by fatique  [] Patient limited by pain  [] Patient limited by other medical complications  [x] Other: pt is doing well post op RCR. He shows good shoulder AROM with mild limit still in BB IR and SL IR motions. He has been tolerating therapy well. Did add in some light isometrics today and pt tolerated well with normal fatigue/soreness in appropriate muscles. Pt is having less pain and soreness with movements and ADLs throughout day but has been limiting use for heavy activities or lifting weight away from body as instructed.  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 Return to Play: (if applicable)   []  Stage 1: Intro to Strength   []  Stage 2: Return to Run and Strength   []  Stage 3: Return to Jump and Strength   []  Stage 4: Dynamic Strength and Agility   []  Stage 5: Sport Specific Training   []  Ready to Return to Play, Meets All Above Stages   []  Not Ready for Return to Sports   Comments:            Prognosis: [x] Good [] Fair  [] Poor    Patient Requires Follow-up: [x] Yes  [] No    PLAN: 1-2x/week for 6 weeks (9/30/22); consider ER iso, TB extn  [x] Continue per plan of care [] Alter current plan (see comments above)  [] Plan of care initiated [] Hold pending MD visit [] Discharge    Note: If patient does not return for scheduled/ recommended follow up visits, this note will serve as a discharge from care along with most recent update on progress.      Electronically signed by: Vinay Mendoza, PT, DPT

## 2022-10-06 ENCOUNTER — HOSPITAL ENCOUNTER (OUTPATIENT)
Dept: PHYSICAL THERAPY | Age: 62
Setting detail: THERAPIES SERIES
Discharge: HOME | End: 2022-10-06
Payer: COMMERCIAL

## 2022-10-06 ENCOUNTER — OFFICE VISIT (OUTPATIENT)
Dept: ORTHOPEDIC SURGERY | Age: 62
End: 2022-10-06

## 2022-10-06 VITALS — BODY MASS INDEX: 27.49 KG/M2 | HEIGHT: 70 IN | WEIGHT: 192 LBS

## 2022-10-06 DIAGNOSIS — M25.512 ACUTE PAIN OF LEFT SHOULDER: ICD-10-CM

## 2022-10-06 DIAGNOSIS — S46.012D TRAUMATIC TEAR OF LEFT ROTATOR CUFF, UNSPECIFIED TEAR EXTENT, SUBSEQUENT ENCOUNTER: Primary | ICD-10-CM

## 2022-10-06 PROCEDURE — 99024 POSTOP FOLLOW-UP VISIT: CPT | Performed by: ORTHOPAEDIC SURGERY

## 2022-10-06 PROCEDURE — 97112 NEUROMUSCULAR REEDUCATION: CPT | Performed by: PHYSICAL THERAPIST

## 2022-10-06 PROCEDURE — 97530 THERAPEUTIC ACTIVITIES: CPT | Performed by: PHYSICAL THERAPIST

## 2022-10-06 PROCEDURE — 97110 THERAPEUTIC EXERCISES: CPT | Performed by: PHYSICAL THERAPIST

## 2022-10-06 RX ORDER — TRAMADOL HYDROCHLORIDE 50 MG/1
TABLET ORAL
COMMUNITY
Start: 2022-09-12 | End: 2022-10-11

## 2022-10-06 NOTE — FLOWSHEET NOTE
[address] [facility],[address], [place] [address] [facility], [address] [place], [place] [address]   Phone: [phone]   Fax: [phone]                                                Physical Therapy Daily Treatment Note  Date:  10/6/2022    Patient Name:  Germán Valerio    :  1960  MRN: [id number]    Medical/Treatment Diagnosis Information:  Diagnosis: s/p L shoulder rotator cuff repair and bicep tenodesis on 22; S46.012D (ICD-10-CM) - Traumatic tear of left rotator cuff, unspecified tear extent, subsequent encounter  Treatment Diagnosis: M25.512 (L) shoulder pain; M62.81 muscle weakness  Insurance/Certification information:  PT Insurance Information: [place]- 50 visits, no auth, $30 copay  Physician Information:   Dr. Skye Barboza  Has the plan of care been signed (Y/N):        [x]  Yes  []  No     Date of Patient follow up with Physician: 10/6/22      Is this a Progress Report:     [x]  Yes  []  No        If Yes:  Date Range for reporting period:  Beginning- 2022   Ending-22    Progress report will be due (10 Rx or 30 days whichever is less):       Recertification will be due (POC Duration  / 90 days whichever is less): as above        Visit # Insurance Allowable Auth Required   11 50 []  Yes [x]  No        Functional Scale: FOTO- 62    Date assessed:  2022      Latex Allergy:  [x]NO      []YES  Preferred Language for Healthcare:   [x]English       []other:      Pain level:  0/10  on 10/6/2022    SUBJECTIVE:  pt is 12 weeks post op. Pt has no pain. Felt fine after last session. Doing well with HEP.  Isometrics       OBJECTIVE:   Observation:   Test measurements:     CERV ROM       Cervical Flexion       Cervical Extension       Cervical SB       Cervical rotation                     ROM PROM AROM-22  Comment     L-  R L R     Flexion    154  155     Abduction    169  165     ER at side    96  96     ER at 90 abd 92  102     BB IR      T11  T9     IR at 90 abd     48  60     Other             Other                    Strength- 9/30/22 L R Comment   Flexion    4+     Abduction    4     ER  4- limited resistance  4+     IR  5  5     Supraspinatus         Upper Trap         Lower Trap         Mid Trap         Rhomboids         Biceps  4+  5     Triceps  5  5     Horizontal Abduction         Horizontal Adduction         Lats            Orthopedic Special Tests: deferred  Special Tests Left Right   Apley Scratch IR:  ER:   Cross body: IR:  ER:  Cross Body:   Neer's       Full Can       Empty Can       Hollice Lily       Nerve Tension Testing       Speed's       Osuna's        Spurling's       Repeated Scaption                        Reflexes/Sensation (myotomes/dermatomes): n/t     Joint mobility: n/t              []Normal                       []Hypo              []Hyper     Palpation:      Functional Mobility/Transfers: Indep     Posture: fair     Bandages/Dressings/Incisions:  healing well  Gait: (include devices/WB status) Indep    RESTRICTIONS/PRECAUTIONS: HTN, HLD; he also has some back pain also. Carpal tunnel L UE.      Exercises/Interventions:     Exercise/Equipment Resistance/Repetitions Other comments   Aerobic Conditioning     Aerodyne          Stretching/PROM     ER supine 1   Cane ER- seated at side     Wand flex    Towel press up     Passive shoulder flex with opposite UE assist     Ball roll on table     Ball on wall for shoulder flex 3X10 green SB    Elbow PROM    Side-lying flex     Table Slides    Wall slides  5x 10 secs flex;  5 x 10 secs abd    UE Crescent    Pulleys 1   Pendulum hold/forward bow     BB IR 10 x 10 secs vertical    SL IR 10x 10 secs    Pec doorway stretch     CBA stretch     UT stretch Shown for HEP    LS stretch Shown for HEP    Isometrics     Retraction        Weight shift     Flexion 10 x 10 secs submax    Abduction 10 x 10 secs submax    External Rotation 10 x 10 secs submax Internal Rotation 10 x 10 secs submax    Biceps     Triceps          PRE's     Flexion     Abduction     External Rotation     Internal Rotation     Shrugs    Prone EXT    Reverse Flys- prone Ts     Prone Ys     Bent over row    Serratus 3 x 10 6 lb    Horizontal Abd with ER     Biceps 3 x 10 8 lbs    Triceps     Retraction     Supination/pronation    Wrist flex/extn     External rotation in abduction (hitch hiker)     Cable Column/Theraband     External rotation to upper cut isometric     External Rotation     Internal Rotation     Shrugs     Lats     Ext 2 x 10 green TB hold 2 secs    Flex     Scapular Retraction 3x 10 black TB    BIC     TRIC 3 x 10 black TB    PNF - D2 flex     PNF- D2 extn     [place] flexion/deltoid retraining 5 mins supine, full arc    Dynamic Stability     Ball on wall     Push ups on wall     Push ups on ball on wall     90/90 ball taps     Horizontal abd ball taps     120 overhead ball taps (Y position)     Plyoback          Manual treatment:                           Patient education: Pt was educated on PT diagnosis, prognosis, and plan of care. Pt was educated on the use of ice throughout the day. Pt educated on doing light scar massage at incisions to decrease tightness. Pt was educated on signs/symptoms of infection and to call with any questions or concerns. Pt educated on post-op dressings, donning/doffing sling, dressing, precautions and restrictions.      Therapeutic Exercise and NMR EXR  [x] (60522) Provided verbal/tactile cueing for activities related to strengthening, flexibility, endurance, ROM  for improvements in scapular, scapulothoracic and UE control with self care, reaching, carrying, lifting, house/yardwork, driving/computer work.    [] (22051) Provided verbal/tactile cueing for activities related to improving balance, coordination, kinesthetic sense, posture, motor skill, proprioception  to assist with  scapular, scapulothoracic and UE control with self care, reaching, carrying, lifting, house/yardwork, driving/computer work. Therapeutic Activities:    [x] (69789 or 59431) Provided verbal/tactile cueing for activities related to improving balance, coordination, kinesthetic sense, posture, motor skill, proprioception and motor activation to allow for proper function of scapular, scapulothoracic and UE control with self care, carrying, lifting, driving/computer work. Home Exercise Program:    [x] (24579) Reviewed/Progressed HEP activities related to strengthening, flexibility, endurance, ROM of scapular, scapulothoracic and UE control with self care, reaching, carrying, lifting, house/yardwork, driving/computer work  [] (82336) Reviewed/Progressed HEP activities related to improving balance, coordination, kinesthetic sense, posture, motor skill, proprioception of scapular, scapulothoracic and UE control with self care, reaching, carrying, lifting, house/yardwork, driving/computer work    Access Code: [email or web address]  URL: [email or web address]  Date: 10/06/2022  Prepared by: Toñito Palm    Exercises  Scaption Wall Slide with Towel - 1 x daily - 7 x weekly - 10 reps - 1 sets - 10 hold  Standing Shoulder Abduction Wall Slide with Thumb Out - 1 x daily - 7 x weekly - 1 sets - 10 reps - 10 hold  Sleeper Stretch - 1 x daily - 7 x weekly - 10 reps - 1 sets - 10 hold  Standing Shoulder Internal Rotation Stretch with Dowel - 1 x daily - 7 x weekly - 1 sets - 10 reps - 10 hold  Supine Shoulder External Rotation in Scaption AAROM - 1 x daily - 2 x weekly - 10 reps - 1 sets - 10 hold  Supine Scapular Protraction in Flexion with Dumbbells - 1 x daily - 7 x weekly - 3 sets - 10 reps  Shoulder extension with resistance - Neutral - 1 x daily - 7 x weekly - 3 sets - 10 reps  Scapular Retraction with Resistance - 1 x daily - 7 x weekly - 3 sets - 10 reps  Isometric Shoulder Flexion at Wall - 1 x daily - 7 x weekly - 1 sets - 10 reps - 10 hold  Isometric Shoulder Abduction at Wall - 1 x daily - 7 x weekly - 1 sets - 10 reps - 10 hold  Standing Isometric Shoulder Internal Rotation at Doorway - 1 x daily - 7 x weekly - 10 reps - 1 sets - 10 hold  Standing Isometric Shoulder External Rotation with Doorway - 1 x daily - 7 x weekly - 1 sets - 10 reps - 10 hold  Supine Shoulder Flexion Extension Full Range AROM - 1 x daily - 7 x weekly - 1 reps - 1 sets  Standing Bicep Curls Supinated with Dumbbells - 1 x daily - 3 x weekly - 3 sets - 10 reps  Standing Tricep Extensions with Resistance - 1 x daily - 3 x weekly - 3 sets - 10 reps          Manual Treatments:  PROM / STM / Oscillations-Mobs:  G-I, II, III, IV (PA's, Inf., Post.)  [] (69508) Provided manual therapy to mobilize soft tissue/joints of cervical/CT, scapular GHJ and UE for the purpose of modulating pain, promoting relaxation,  increasing ROM, reducing/eliminating soft tissue swelling/inflammation/restriction, improving soft tissue extensibility and allowing for proper ROM for normal function with self care, reaching, carrying, lifting, house/yardwork, driving/computer work    Modalities: will ice on way home  Charges:  Timed Code Treatment Minutes: 41   Total Treatment Minutes: 43     [] EVAL (LOW) 94220   [] EVAL (MOD) 90354   [] EVAL (HIGH) 00435   [] RE-EVAL   [x] IC(30681) x  1  [] IONTO  [x] NMR (82967) x  1   [] VASO  [] Manual (24618) x      [] Other:  [x] TA x1      [] Mech Traction (93206)  [] ES(attended) (20460)      [] ES (un) (34126):       [place] 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 Independent in HEP and progression per patient tolerance, in order to prevent re-injury. [] Progressing: [x] Met: [] Not Met: [] Adjusted   2. Patient will have a decrease in pain to facilitate improvement in movement, function, and ADLs as indicated by Functional Deficits. [] Progressing: [x] Met: [] Not Met: [] Adjusted     Long Term Goals:  To be achieved in: 5-6 months  1. Functional index score of 65% or more for the FOTO to assist with reaching prior level of function. [x] Progressing: [] Met: [x] Not Met: [] Adjusted  2. Patient will demonstrate increased L shoulder AROM to  155 flex, 170 abd, and ER at 90 to 90 degrees to allow for proper joint functioning as indicated by patients Functional Deficits. [x] Progressing: [] Met: [x] Not Met: [] Adjusted  3. Patient will demonstrate an increase in  L shoulder strength to 4/5 flex, abd, ER in UE to allow for proper functional mobility as indicated by patients Functional Deficits. [x] Progressing: [] Met: [x] Not Met: [] Adjusted  4. Patient will return to ADLs above shoulder height without increased symptoms or restriction. [x] Progressing: [] Met: [x] Not Met: [] Adjusted  5. 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 [x] Progressing: [] Met: [x] Not Met: [] Adjusted         Overall Progression Towards Functional goals/ Treatment Progress Update:  [x] Patient is progressing as expected towards functional goals listed. [] Progression is slowed due to complexities/Impairments listed. [] Progression has been slowed due to co-morbidities. [] Plan just implemented, too soon to assess goals progression <30days   [] Goals require adjustment due to lack of progress  [] Patient is not progressing as expected and requires additional follow up with physician  [] Other    Prognosis for POC: [x] Good [] Fair  [] Poor      Patient requires continued skilled intervention: [x] Yes  [] No      ASSESSMENT:    Treatment/Activity Tolerance:  [x] Patient tolerated treatment well [] Patient limited by fatique  [] Patient limited by pain  [] Patient limited by other medical complications  [x] Other: Pt was progressed to more isometrics today. Pt was most fatigued with ER isometric. He was able to increase to 5 mins on Tilt flex program today.  Pt was given updated HEP from today. 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 Return to Play: (if applicable)   []  Stage 1: Intro to Strength   []  Stage 2: Return to Run and Strength   []  Stage 3: Return to Jump and Strength   []  Stage 4: Dynamic Strength and Agility   []  Stage 5: Sport Specific Training   []  Ready to Return to Play, Meets All Above Stages   []  Not Ready for Return to Sports   Comments:            Prognosis: [x] Good [] Fair  [] Poor    Patient Requires Follow-up: [x] Yes  [] No    PLAN: 1-2x/week for 6 weeks (9/30/22); consider Tb IR, tricep kick back  [x] Continue per plan of care [] Alter current plan (see comments above)  [] Plan of care initiated [] Hold pending MD visit [] Discharge    Note: If patient does not return for scheduled/ recommended follow up visits, this note will serve as a discharge from care along with most recent update on progress.      Electronically signed by: Meggan La, PT, DPT

## 2022-10-06 NOTE — PROGRESS NOTES
Jesika Bragg returns today to follow-up his left shoulder rotator cuff repair. He is doing well. Pain is well managed. Today, incisions look well-healed. He has better than antigravity strength in all planes and no tenderness. He really looks good today and will continue with his rehab. Follow-up with me in 3 more months.

## 2022-10-11 ENCOUNTER — HOSPITAL ENCOUNTER (OUTPATIENT)
Dept: PHYSICAL THERAPY | Age: 62
Setting detail: THERAPIES SERIES
Discharge: HOME | End: 2022-10-11
Payer: COMMERCIAL

## 2022-10-11 PROCEDURE — 97530 THERAPEUTIC ACTIVITIES: CPT | Performed by: PHYSICAL THERAPIST

## 2022-10-11 PROCEDURE — 97110 THERAPEUTIC EXERCISES: CPT | Performed by: PHYSICAL THERAPIST

## 2022-10-11 PROCEDURE — 97112 NEUROMUSCULAR REEDUCATION: CPT | Performed by: PHYSICAL THERAPIST

## 2022-10-11 NOTE — FLOWSHEET NOTE
[address] [facility],[address], [place] [address] [address] [facility], [facility], [address] [place] [address]  Phone: [phone]   Fax: [phone]                                                Physical Therapy Daily Treatment Note  Date:  10/11/2022    Patient Name:  Lilly Garcia    :  1960  MRN: [id number]    Medical/Treatment Diagnosis Information:  Diagnosis: s/p L shoulder rotator cuff repair and bicep tenodesis on 22; S46.012D (ICD-10-CM) - Traumatic tear of left rotator cuff, unspecified tear extent, subsequent encounter  Treatment Diagnosis: M25.512 (L) shoulder pain; M62.81 muscle weakness  Insurance/Certification information:  PT Insurance Information: [place]- 50 visits, no auth, $30 copay  Physician Information:   Dr. Emerald Araujo  Has the plan of care been signed (Y/N):        [x]  Yes  []  No     Date of Patient follow up with Physician: 23      Is this a Progress Report:     [x]  Yes  []  No        If Yes:  Date Range for reporting period:  Beginning- 2022   Ending-22    Progress report will be due (10 Rx or 30 days whichever is less):       Recertification will be due (POC Duration  / 90 days whichever is less): as above        Visit # Insurance Allowable Auth Required   12 50 []  Yes [x]  No        Functional Scale: FOTO- 62    Date assessed:  2022      Latex Allergy:  [x]NO      []YES  Preferred Language for Healthcare:   [x]English       []other:      Pain level:  0/10  on 10/11/2022    SUBJECTIVE:  pt is 12.5 weeks post op. Pt reports was more sore with Er isometric. Otherwise doing well. No pain. Pt saw MD and said was looking good.        OBJECTIVE:   Observation:   Test measurements:     CERV ROM       Cervical Flexion       Cervical Extension       Cervical SB       Cervical rotation                     ROM PROM AROM-22  Comment     L-  R L R     Flexion    154  155     Abduction    169  165 ER at side    96  96     ER at 90 abd    92  102     BB IR      T11  T9     IR at 90 abd     48  60     Other             Other                    Strength- 9/30/22 L R Comment   Flexion    4+     Abduction    4     ER  4- limited resistance  4+     IR  5  5     Supraspinatus         Upper Trap         Lower Trap         Mid Trap         Rhomboids         Biceps  4+  5     Triceps  5  5     Horizontal Abduction         Horizontal Adduction         Lats            Orthopedic Special Tests: deferred  Special Tests Left Right   Apley Scratch IR:  ER:   Cross body: IR:  ER:  Cross Body:   Neer's       Full Can       Empty Can       Jermain Saunas       Nerve Tension Testing       Speed's       Osuna's        Spurling's       Repeated Scaption                        Reflexes/Sensation (myotomes/dermatomes): n/t     Joint mobility: n/t              []Normal                       []Hypo              []Hyper     Palpation:      Functional Mobility/Transfers: Indep     Posture: fair     Bandages/Dressings/Incisions:  healing well  Gait: (include devices/WB status) Indep    RESTRICTIONS/PRECAUTIONS: HTN, HLD; he also has some back pain also. Carpal tunnel L UE.      Exercises/Interventions:     Exercise/Equipment Resistance/Repetitions Other comments   Aerobic Conditioning     Aerodyne          Stretching/PROM     Er in doorway 5 x 10 secs    ER supine 1   Cane ER- seated at side     Wand flex    Towel press up     Passive shoulder flex with opposite UE assist     Ball roll on table     Ball on wall for shoulder flex 3X10 green SB    Elbow PROM    Side-lying flex     Table Slides    Wall slides     UE [place]    Pulleys 1   Pendulum hold/forward bow     BB IR 10 x 10 secs vertical    SL IR 10x 10 secs    Pec doorway stretch     CBA stretch     UT stretch Shown for HEP    LS stretch Shown for HEP    Isometrics     Retraction        Weight shift     Flexion 1   Abduction    External Rotation    Internal Rotation    Biceps Triceps          PRE's     Flexion     Abduction     External Rotation SL 3 x 10     Internal Rotation     Shrugs    Prone EXT    Reverse Flys- prone Ts     Prone Ys     Bent over row    Serratus 3 x 10 8 lb    Horizontal Abd with ER     Biceps 3 x 10 8 lbs    Triceps Kick back 6 lbs    Retraction     Supination/pronation    Wrist flex/extn     External rotation in abduction (hitch hiker)     Cable Column/Theraband     External rotation to upper cut isometric     External Rotation     Internal Rotation 3 x 10 green TB    Shrugs     Lats     Ext 2 x 10 green TB hold 2 secs    Flex     Scapular Retraction 3x 10 blue TB    BIC     TRIC    PNF - D2 flex     PNF- D2 extn     [place] flexion/deltoid retraining 5 mins supine, full arc, 1 lb    Dynamic Stability     Ball on wall     Push ups on wall     Push ups on ball on wall     90/90 ball taps     Horizontal abd ball taps     120 overhead ball taps (Y position)     Plyoback          Manual treatment:                           Patient education: Pt was educated on PT diagnosis, prognosis, and plan of care. Pt was educated on the use of ice throughout the day. Pt educated on doing light scar massage at incisions to decrease tightness. Pt was educated on signs/symptoms of infection and to call with any questions or concerns. Pt educated on post-op dressings, donning/doffing sling, dressing, precautions and restrictions.      Therapeutic Exercise and NMR EXR  [x] (91129) Provided verbal/tactile cueing for activities related to strengthening, flexibility, endurance, ROM  for improvements in scapular, scapulothoracic and UE control with self care, reaching, carrying, lifting, house/yardwork, driving/computer work.    [] (90104) Provided verbal/tactile cueing for activities related to improving balance, coordination, kinesthetic sense, posture, motor skill, proprioception  to assist with  scapular, scapulothoracic and UE control with self care, reaching, carrying, lifting, house/yardwork, driving/computer work. Therapeutic Activities:    [x] (98652 or 97225) Provided verbal/tactile cueing for activities related to improving balance, coordination, kinesthetic sense, posture, motor skill, proprioception and motor activation to allow for proper function of scapular, scapulothoracic and UE control with self care, carrying, lifting, driving/computer work. Home Exercise Program:    [x] (20051) Reviewed/Progressed HEP activities related to strengthening, flexibility, endurance, ROM of scapular, scapulothoracic and UE control with self care, reaching, carrying, lifting, house/yardwork, driving/computer work  [] (59374) Reviewed/Progressed HEP activities related to improving balance, coordination, kinesthetic sense, posture, motor skill, proprioception of scapular, scapulothoracic and UE control with self care, reaching, carrying, lifting, house/yardwork, driving/computer work    Access Code: [email or web address]  URL: [email or web address]  Date: 10/11/2022  Prepared by: David Oh    Exercises  Scaption Wall Slide with Towel - 1 x daily - 7 x weekly - 10 reps - 1 sets - 10 hold  Standing Shoulder Abduction Wall Slide with Thumb Out - 1 x daily - 7 x weekly - 1 sets - 10 reps - 10 hold  Sleeper Stretch - 1 x daily - 7 x weekly - 10 reps - 1 sets - 10 hold  Standing Shoulder Internal Rotation Stretch with Dowel - 1 x daily - 7 x weekly - 1 sets - 10 reps - 10 hold  Single Arm Doorway Pec Stretch at 90 Degrees Abduction - 1 x daily - 7 x weekly - 1 sets - 5 reps - 10 hold  Supine Shoulder External Rotation in Scaption AAROM - 1 x daily - 2 x weekly - 5 reps - 1 sets - 10 hold  Supine Scapular Protraction in Flexion with Dumbbells - 1 x daily - 7 x weekly - 3 sets - 10 reps  Shoulder Internal Rotation with Resistance - 1 x daily - 7 x weekly - 3 sets - 10 reps  Shoulder extension with resistance - Neutral - 1 x daily - 7 x weekly - 3 sets - 10 reps  Scapular Retraction with Resistance - 1 x daily - 7 x weekly - 3 sets - 10 reps  Isometric Shoulder Abduction at Wall - 1 x daily - 7 x weekly - 1 sets - 10 reps - 10 hold  Sidelying Shoulder External Rotation - 1 x daily - 7 x weekly - 3 sets - 10 reps  Supine Shoulder Flexion Extension Full Range AROM - 1 x daily - 7 x weekly - 1 reps - 1 sets  Standing Bicep Curls Supinated with Dumbbells - 1 x daily - 3 x weekly - 3 sets - 10 reps  Standing Tricep Extensions with Resistance - 1 x daily - 3 x weekly - 3 sets - 10 reps          Manual Treatments:  PROM / STM / Oscillations-Mobs:  G-I, II, III, IV (PA's, Inf., Post.)  [] (66732) Provided manual therapy to mobilize soft tissue/joints of cervical/CT, scapular GHJ and UE for the purpose of modulating pain, promoting relaxation,  increasing ROM, reducing/eliminating soft tissue swelling/inflammation/restriction, improving soft tissue extensibility and allowing for proper ROM for normal function with self care, reaching, carrying, lifting, house/yardwork, driving/computer work    Modalities: will ice on way home  Charges:  Timed Code Treatment Minutes: 40   Total Treatment Minutes: 45     [] EVAL (LOW) 46876   [] EVAL (MOD) 87491   [] EVAL (HIGH) 60405   [] RE-EVAL   [x] HF(23122) x  1  [] IONTO  [x] NMR (68086) x  1   [] VASO  [] Manual (29198) x      [] Other:  [x] TA x1      [] Mech Traction (30436)  [] ES(attended) (52162)      [] ES (un) (63033):       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 Independent in HEP and progression per patient tolerance, in order to prevent re-injury. [] Progressing: [x] Met: [] Not Met: [] Adjusted   2. Patient will have a decrease in pain to facilitate improvement in movement, function, and ADLs as indicated by Functional Deficits. [] Progressing: [x] Met: [] Not Met: [] Adjusted     Long Term Goals: To be achieved in: 5-6 months  1.  Functional index score of 65% or more for the FOTO to assist with reaching prior level of function. [x] Progressing: [] Met: [x] Not Met: [] Adjusted  2. Patient will demonstrate increased L shoulder AROM to  155 flex, 170 abd, and ER at 90 to 90 degrees to allow for proper joint functioning as indicated by patients Functional Deficits. [x] Progressing: [] Met: [x] Not Met: [] Adjusted  3. Patient will demonstrate an increase in  L shoulder strength to 4/5 flex, abd, ER in UE to allow for proper functional mobility as indicated by patients Functional Deficits. [x] Progressing: [] Met: [x] Not Met: [] Adjusted  4. Patient will return to ADLs above shoulder height without increased symptoms or restriction. [x] Progressing: [] Met: [x] Not Met: [] Adjusted  5. 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 [x] Progressing: [] Met: [x] Not Met: [] Adjusted         Overall Progression Towards Functional goals/ Treatment Progress Update:  [x] Patient is progressing as expected towards functional goals listed. [] Progression is slowed due to complexities/Impairments listed. [] Progression has been slowed due to co-morbidities. [] Plan just implemented, too soon to assess goals progression <30days   [] Goals require adjustment due to lack of progress  [] Patient is not progressing as expected and requires additional follow up with physician  [] Other    Prognosis for POC: [x] Good [] Fair  [] Poor      Patient requires continued skilled intervention: [x] Yes  [] No      ASSESSMENT:    Treatment/Activity Tolerance:  [x] Patient tolerated treatment well [] Patient limited by fatique  [] Patient limited by pain  [] Patient limited by other medical complications  [x] Other:  pt was advanced from isometrics to SL ER and TB IR today. Pt tolerated this well. He was also able to add 1 lb to [facility] flex today and tolerated well. Pt given updated HEP.   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 Return to Play: (if applicable)   []  Stage 1: Intro to Strength   []  Stage 2: Return to Run and Strength   []  Stage 3: Return to Jump and Strength   []  Stage 4: Dynamic Strength and Agility   []  Stage 5: Sport Specific Training   []  Ready to Return to Play, Meets All Above Stages   []  Not Ready for Return to Sports   Comments:            Prognosis: [x] Good [] Fair  [] Poor    Patient Requires Follow-up: [x] Yes  [] No    PLAN: 1-2x/week for 6 weeks (9/30/22); consider weight to SL ER, standing scap  [x] Continue per plan of care [] Alter current plan (see comments above)  [] Plan of care initiated [] Hold pending MD visit [] Discharge    Note: If patient does not return for scheduled/ recommended follow up visits, this note will serve as a discharge from care along with most recent update on progress.      Electronically signed by: Siddharth Davenport, PT, DPT

## 2022-10-18 ENCOUNTER — HOSPITAL ENCOUNTER (OUTPATIENT)
Dept: PHYSICAL THERAPY | Age: 62
Setting detail: THERAPIES SERIES
Discharge: HOME | End: 2022-10-18
Payer: COMMERCIAL

## 2022-10-18 PROCEDURE — 97112 NEUROMUSCULAR REEDUCATION: CPT | Performed by: PHYSICAL THERAPIST

## 2022-10-18 PROCEDURE — 97530 THERAPEUTIC ACTIVITIES: CPT | Performed by: PHYSICAL THERAPIST

## 2022-10-18 PROCEDURE — 97110 THERAPEUTIC EXERCISES: CPT | Performed by: PHYSICAL THERAPIST

## 2022-10-18 NOTE — FLOWSHEET NOTE
[address], [place] [address] [place], [place] [address]   Phone: [phone]   Fax: [phone]                                                Physical Therapy Daily Treatment Note  Date:  10/18/2022    Patient Name:  Paul Vanegas    :  1960  MRN: [id number]    Medical/Treatment Diagnosis Information:  Diagnosis: s/p L shoulder rotator cuff repair and bicep tenodesis on 22; S46.012D (ICD-10-CM) - Traumatic tear of left rotator cuff, unspecified tear extent, subsequent encounter  Treatment Diagnosis: M25.512 (L) shoulder pain; M62.81 muscle weakness  Insurance/Certification information:  PT Insurance Information: [employer]- 50 visits, no auth, $30 copay  Physician Information:   Dr. Elizabeth Smalls  Has the plan of care been signed (Y/N):        [x]  Yes  []  No     Date of Patient follow up with Physician: 23      Is this a Progress Report:     [x]  Yes  []  No        If Yes:  Date Range for reporting period:  Beginning- 2022   Ending-22    Progress report will be due (10 Rx or 30 days whichever is less):       Recertification will be due (POC Duration  / 90 days whichever is less): as above        Visit # Insurance Allowable Auth Required   13 50 []  Yes [x]  No        Functional Scale: FOTO- 62    Date assessed:  2022      Latex Allergy:  [x]NO      []YES  Preferred Language for Healthcare:   [x]English       []other:      Pain level:  0/10  on 10/18/2022    SUBJECTIVE:  pt is 13.5 weeks post op. Pt reports that he gets sore/fatigued lateral shoulder with SL ER but recovers. No pain otherwise. He is helping his daughter move this weekend but will not be doing any heavy lifting.        OBJECTIVE:   Observation:   Test measurements:     CERV ROM       Cervical Flexion       Cervical Extension       Cervical SB       Cervical rotation                     ROM PROM AROM-22 Comment     L-  R L R     Flexion    154  155     Abduction    169  165     ER at side    96  96     ER at 90 abd    92  102     BB IR      T11  T9     IR at 90 abd     48  60     Other             Other                    Strength- 9/30/22 L R Comment   Flexion    4+     Abduction    4     ER  4- limited resistance  4+     IR  5  5     Supraspinatus         Upper Trap         Lower Trap         Mid Trap         Rhomboids         Biceps  4+  5     Triceps  5  5     Horizontal Abduction         Horizontal Adduction         Lats            Orthopedic Special Tests: deferred  Special Tests Left Right   Apley Scratch IR:  ER:   Cross body: IR:  ER:  Cross Body:   Neer's       Full Can       Empty Can       Kristopher Candle       Nerve Tension Testing       Speed's       Osuna's        Spurling's       Repeated Scaption                        Reflexes/Sensation (myotomes/dermatomes): n/t     Joint mobility: n/t              []Normal                       []Hypo              []Hyper     Palpation:      Functional Mobility/Transfers: Indep     Posture: fair     Bandages/Dressings/Incisions:  healing well  Gait: (include devices/WB status) Indep    RESTRICTIONS/PRECAUTIONS: HTN, HLD; he also has some back pain also. Carpal tunnel L UE.      Exercises/Interventions:     Exercise/Equipment Resistance/Repetitions Other comments   Aerobic Conditioning     Aerodyne          Stretching/PROM     Er in doorway at 90 abd 5 x 10 secs    ER supine 1   Cane ER- seated at side     Wand flex     Towel press up     Passive shoulder flex with opposite UE assist     Ball roll on table    Ball on wall for shoulder flex 3X10 green SB    Elbow PROM    Side-lying flex     Table Slides    Wall slides     UE Brice    Pulleys     Pendulum hold/forward bow     BB IR 10 x 10 secs vertical    SL IR 10x 10 secs against wall 10/18    Pec doorway stretch     CBA stretch     UT stretch Shown for HEP    LS stretch Shown for HEP    Isometrics Retraction        Weight shift     Flexion     Abduction    External Rotation    Internal Rotation    Biceps     Triceps          PRE's     scaption 3 x 10 3 lbs    Abduction     External Rotation SL 3 x 10 5 lbs    Internal Rotation     Shrugs    Prone EXT    Reverse Flys- prone Ts     Prone Ys     Bent over row    Serratus 3 x 10 10 lb    Horizontal Abd with ER     Biceps 3 x 10 8 lbs    Triceps Kick back 6 lbs    Retraction     Supination/pronation    Wrist flex/extn     External rotation in abduction (hitch hiker)     Cable Column/Theraband     External rotation to upper cut isometric     External Rotation     Internal Rotation 3 x 10 green TB    Shrugs     Lats     Ext 2 x 10 green TB hold 2 secs    Flex     Scapular Retraction 3x 10 blue TB    BIC     TRIC    PNF - D2 flex     PNF- D2 extn     [facility] flexion/deltoid retraining Done at home already 10/18   Dynamic Stability     Ball on wall 2 x 30 each vertical and lateral    Push ups on wall     Push ups on ball on wall     90/90 ball taps     Horizontal abd ball taps     120 overhead ball taps (Y position)     Plyoback          Manual treatment:                           Patient education: Pt was educated on PT diagnosis, prognosis, and plan of care. Pt was educated on the use of ice throughout the day. Pt educated on doing light scar massage at incisions to decrease tightness. Pt was educated on signs/symptoms of infection and to call with any questions or concerns. Pt educated on post-op dressings, donning/doffing sling, dressing, precautions and restrictions.      Therapeutic Exercise and NMR EXR  [x] (23057) Provided verbal/tactile cueing for activities related to strengthening, flexibility, endurance, ROM  for improvements in scapular, scapulothoracic and UE control with self care, reaching, carrying, lifting, house/yardwork, driving/computer work.    [] (94743) Provided verbal/tactile cueing for activities related to improving balance, coordination, kinesthetic sense, posture, motor skill, proprioception  to assist with  scapular, scapulothoracic and UE control with self care, reaching, carrying, lifting, house/yardwork, driving/computer work. Therapeutic Activities:    [x] (60794 or 29985) Provided verbal/tactile cueing for activities related to improving balance, coordination, kinesthetic sense, posture, motor skill, proprioception and motor activation to allow for proper function of scapular, scapulothoracic and UE control with self care, carrying, lifting, driving/computer work. Home Exercise Program:    [x] (10501) Reviewed/Progressed HEP activities related to strengthening, flexibility, endurance, ROM of scapular, scapulothoracic and UE control with self care, reaching, carrying, lifting, house/yardwork, driving/computer work  [] (94978) Reviewed/Progressed HEP activities related to improving balance, coordination, kinesthetic sense, posture, motor skill, proprioception of scapular, scapulothoracic and UE control with self care, reaching, carrying, lifting, house/yardwork, driving/computer work    Access Code: [email or web address]  URL: [email or web address]  Date: 10/11/2022  Prepared by: Cherelle Pathak    Exercises  Scaption Wall Slide with Towel - 1 x daily - 7 x weekly - 10 reps - 1 sets - 10 hold  Standing Shoulder Abduction Wall Slide with Thumb Out - 1 x daily - 7 x weekly - 1 sets - 10 reps - 10 hold  Sleeper Stretch - 1 x daily - 7 x weekly - 10 reps - 1 sets - 10 hold  Standing Shoulder Internal Rotation Stretch with Dowel - 1 x daily - 7 x weekly - 1 sets - 10 reps - 10 hold  Single Arm Doorway Pec Stretch at 90 Degrees Abduction - 1 x daily - 7 x weekly - 1 sets - 5 reps - 10 hold  Supine Shoulder External Rotation in Scaption AAROM - 1 x daily - 2 x weekly - 5 reps - 1 sets - 10 hold  Supine Scapular Protraction in Flexion with Dumbbells - 1 x daily - 7 x weekly - 3 sets - 10 reps  Shoulder Internal Rotation with Resistance - 1 x daily - 7 x weekly - 3 sets - 10 reps  Shoulder extension with resistance - Neutral - 1 x daily - 7 x weekly - 3 sets - 10 reps  Scapular Retraction with Resistance - 1 x daily - 7 x weekly - 3 sets - 10 reps  Isometric Shoulder Abduction at Wall - 1 x daily - 7 x weekly - 1 sets - 10 reps - 10 hold  Sidelying Shoulder External Rotation - 1 x daily - 7 x weekly - 3 sets - 10 reps  Supine Shoulder Flexion Extension Full Range AROM - 1 x daily - 7 x weekly - 1 reps - 1 sets  Standing Bicep Curls Supinated with Dumbbells - 1 x daily - 3 x weekly - 3 sets - 10 reps  Standing Tricep Extensions with Resistance - 1 x daily - 3 x weekly - 3 sets - 10 reps    Ball on wall and standing scap added 10/18      Manual Treatments:  PROM / STM / Oscillations-Mobs:  G-I, II, III, IV (PA's, Inf., Post.)  [] (27233) Provided manual therapy to mobilize soft tissue/joints of cervical/CT, scapular GHJ and UE for the purpose of modulating pain, promoting relaxation,  increasing ROM, reducing/eliminating soft tissue swelling/inflammation/restriction, improving soft tissue extensibility and allowing for proper ROM for normal function with self care, reaching, carrying, lifting, house/yardwork, driving/computer work    Modalities: will ice on way home  Charges:  Timed Code Treatment Minutes: 44   Total Treatment Minutes: 48     [] EVAL (LOW) 95229   [] EVAL (MOD) 31467   [] EVAL (HIGH) 19410   [] RE-EVAL   [x] TT(65205) x  1  [] IONTO  [x] NMR (96963) x  1   [] VASO  [] Manual (53836) x      [] Other:  [x] TA x1      [] Mech Traction (74378)  [] ES(attended) (66358)      [] ES (un) (81836):       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 Independent in HEP and progression per patient tolerance, in order to prevent re-injury. [] Progressing: [x] Met: [] Not Met: [] Adjusted   2.  Patient will have a decrease in pain to facilitate improvement in movement, function, and ADLs as indicated by Functional Deficits. [] Progressing: [x] Met: [] Not Met: [] Adjusted     Long Term Goals: To be achieved in: 5-6 months  1. Functional index score of 65% or more for the FOTO to assist with reaching prior level of function. [x] Progressing: [] Met: [x] Not Met: [] Adjusted  2. Patient will demonstrate increased L shoulder AROM to  155 flex, 170 abd, and ER at 90 to 90 degrees to allow for proper joint functioning as indicated by patients Functional Deficits. [x] Progressing: [] Met: [x] Not Met: [] Adjusted  3. Patient will demonstrate an increase in  L shoulder strength to 4/5 flex, abd, ER in UE to allow for proper functional mobility as indicated by patients Functional Deficits. [x] Progressing: [] Met: [x] Not Met: [] Adjusted  4. Patient will return to ADLs above shoulder height without increased symptoms or restriction. [x] Progressing: [] Met: [x] Not Met: [] Adjusted  5. 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 [x] Progressing: [] Met: [x] Not Met: [] Adjusted         Overall Progression Towards Functional goals/ Treatment Progress Update:  [x] Patient is progressing as expected towards functional goals listed. [] Progression is slowed due to complexities/Impairments listed. [] Progression has been slowed due to co-morbidities.   [] Plan just implemented, too soon to assess goals progression <30days   [] Goals require adjustment due to lack of progress  [] Patient is not progressing as expected and requires additional follow up with physician  [] Other    Prognosis for POC: [x] Good [] Fair  [] Poor      Patient requires continued skilled intervention: [x] Yes  [] No      ASSESSMENT:    Treatment/Activity Tolerance:  [x] Patient tolerated treatment well [] Patient limited by fatique  [] Patient limited by pain  [] Patient limited by other medical complications  [x] Other: pt did well in session today. He was able to add more resistance to several exercises throughout session. Tolerated addition of ball on wall and standing scaption well today and these were added to HEP. 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 Return to Play: (if applicable)   []  Stage 1: Intro to Strength   []  Stage 2: Return to Run and Strength   []  Stage 3: Return to Jump and Strength   []  Stage 4: Dynamic Strength and Agility   []  Stage 5: Sport Specific Training   []  Ready to Return to Play, Meets All Above Stages   []  Not Ready for Return to Sports   Comments:            Prognosis: [x] Good [] Fair  [] Poor    Patient Requires Follow-up: [x] Yes  [] No    PLAN: 1-2x/week for 6 weeks (9/30/22); consider  circles ball on wall, Er at 90 abd, prone T  [x] Continue per plan of care [] Alter current plan (see comments above)  [] Plan of care initiated [] Hold pending MD visit [] Discharge    Note: If patient does not return for scheduled/ recommended follow up visits, this note will serve as a discharge from care along with most recent update on progress.      Electronically signed by: Maricarmen Bailey, PT, DPT

## 2022-10-25 ENCOUNTER — HOSPITAL ENCOUNTER (OUTPATIENT)
Dept: PHYSICAL THERAPY | Age: 62
Setting detail: THERAPIES SERIES
Discharge: HOME | End: 2022-10-25
Payer: COMMERCIAL

## 2022-10-25 PROCEDURE — 97110 THERAPEUTIC EXERCISES: CPT | Performed by: PHYSICAL THERAPIST

## 2022-10-25 PROCEDURE — 97112 NEUROMUSCULAR REEDUCATION: CPT | Performed by: PHYSICAL THERAPIST

## 2022-10-25 PROCEDURE — 97530 THERAPEUTIC ACTIVITIES: CPT | Performed by: PHYSICAL THERAPIST

## 2022-10-25 NOTE — FLOWSHEET NOTE
[address], [place] [address] [address] [facility], [facility], [address]   Phone: [phone]   Fax: [phone]                                                Physical Therapy Daily Treatment Note  Date:  10/25/2022    Patient Name:  Lilly Garcia    :  1960  MRN: [id number]    Medical/Treatment Diagnosis Information:  Diagnosis: s/p L shoulder rotator cuff repair and bicep tenodesis on 22; S46.012D (ICD-10-CM) - Traumatic tear of left rotator cuff, unspecified tear extent, subsequent encounter  Treatment Diagnosis: M25.512 (L) shoulder pain; M62.81 muscle weakness  Insurance/Certification information:  PT Insurance Information: [place]- 50 visits, no auth, $30 copay  Physician Information:   Dr. Emerald Araujo  Has the plan of care been signed (Y/N):        [x]  Yes  []  No     Date of Patient follow up with Physician: 23      Is this a Progress Report:     [x]  Yes  []  No        If Yes:  Date Range for reporting period:  Beginning- 2022   Ending-22    Progress report will be due (10 Rx or 30 days whichever is less):       Recertification will be due (POC Duration  / 90 days whichever is less): as above        Visit # Insurance Allowable Auth Required   14 50 []  Yes [x]  No        Functional Scale: FOTO- 62    Date assessed:  2022      Latex Allergy:  [x]NO      []YES  Preferred Language for Healthcare:   [x]English       []other:      Pain level:  0/10  on 10/25/2022    SUBJECTIVE:  pt is 13.5 weeks post op. Pt reports shoulder is feeling good today. Pt has not had the chance to do the exercises/stretches today.      OBJECTIVE:   Observation:   Test measurements:     CERV ROM       Cervical Flexion       Cervical Extension       Cervical SB       Cervical rotation                     ROM PROM AROM-22  Comment     L-  R L R     Flexion    154  155     Abduction    169  165     ER at side    96  96     ER at 90 abd    92  102     BB IR      T11  T9     IR at 90 abd     48  60     Other             Other                    Strength- 9/30/22 L R Comment   Flexion    4+     Abduction    4     ER  4- limited resistance  4+     IR  5  5     Supraspinatus         Upper Trap         Lower Trap         Mid Trap         Rhomboids         Biceps  4+  5     Triceps  5  5     Horizontal Abduction         Horizontal Adduction         Lats            Orthopedic Special Tests: deferred  Special Tests Left Right   Apley Scratch IR:  ER:   Cross body: IR:  ER:  Cross Body:   Neer's       Full Can       Empty Can       Celester Lesvia       Nerve Tension Testing       Speed's       Osuna's        Spurling's       Repeated Scaption                        Reflexes/Sensation (myotomes/dermatomes): n/t     Joint mobility: n/t              []Normal                       []Hypo              []Hyper     Palpation:      Functional Mobility/Transfers: Indep     Posture: fair     Bandages/Dressings/Incisions:  healing well  Gait: (include devices/WB status) Indep    RESTRICTIONS/PRECAUTIONS: HTN, HLD; he also has some back pain also. Carpal tunnel L UE.      Exercises/Interventions:     Exercise/Equipment Resistance/Repetitions Other comments   Aerobic Conditioning     Aerodyne          Stretching/PROM     Er in doorway at 90 abd 5 x 10 secs    ER supine 1   Cane ER- seated at side     Wand flex     Towel press up     Passive shoulder flex with opposite UE assist     Ball roll on table    Ball on wall for shoulder flex 3X10 green SB    Elbow PROM    Side-lying flex     Table Slides    Wall slides     UE [place]    Pulleys     Pendulum hold/forward bow     BB IR 10 x 10 secs vertical    SL IR 10x 10 secs against wall 10/18    Pec doorway stretch     CBA stretch     UT stretch Shown for HEP    LS stretch Shown for HEP    Isometrics     Retraction        Weight shift     Flexion     Abduction    External Rotation Internal Rotation    Biceps     Triceps          PRE's     scaption 3 x 10 3 lbs    Abduction     External Rotation SL 3 x 10 5 lbs    Internal Rotation     Shrugs    Prone EXT    Reverse Flys- prone Ts 2x10 1lb  Off plinth    Prone Ys     Bent over row    Serratus 3 x 10 10 lb B   Horizontal Abd with ER     Biceps Triceps Retraction     Supination/pronation    Wrist flex/extn     External rotation in abduction (hitch hiker) 2x10, 5lb  L only   Cable Column/Theraband     External rotation to upper cut isometric     External Rotation     Internal Rotation 3 x 10 blue TB    Shrugs     Lats     Ext 2 x 10 blue TB hold 2 secs    Flex     Scapular Retraction 2x10 35lbs with v-bar row attachment  CC    BIC     TRIC    PNF - D2 flex     PNF- D2 extn     [place] flexion/deltoid retraining 5 mins supine, full arc, 2 lb Plinth incline to 45 deg   Dynamic Stability     Ball on wall 2 x 30 each CW/CCW    Push ups on wall     Push ups on ball on wall     90/90 ball taps     Horizontal abd ball taps     120 overhead ball taps (Y position)     P[place]oback          Manual treatment:                           Patient education: Pt was educated on PT diagnosis, prognosis, and plan of care. Pt was educated on the use of ice throughout the day. Pt educated on doing light scar massage at incisions to decrease tightness. Pt was educated on signs/symptoms of infection and to call with any questions or concerns. Pt educated on post-op dressings, donning/doffing sling, dressing, precautions and restrictions.      Therapeutic Exercise and NMR EXR  [x] (58884) Provided verbal/tactile cueing for activities related to strengthening, flexibility, endurance, ROM  for improvements in scapular, scapulothoracic and UE control with self care, reaching, carrying, lifting, house/yardwork, driving/computer work.    [] (06289) Provided verbal/tactile cueing for activities related to improving balance, coordination, kinesthetic sense, posture, motor skill, proprioception  to assist with  scapular, scapulothoracic and UE control with self care, reaching, carrying, lifting, house/yardwork, driving/computer work. Therapeutic Activities:    [x] (81097 or 27107) Provided verbal/tactile cueing for activities related to improving balance, coordination, kinesthetic sense, posture, motor skill, proprioception and motor activation to allow for proper function of scapular, scapulothoracic and UE control with self care, carrying, lifting, driving/computer work. Home Exercise Program:    [x] (93269) Reviewed/Progressed HEP activities related to strengthening, flexibility, endurance, ROM of scapular, scapulothoracic and UE control with self care, reaching, carrying, lifting, house/yardwork, driving/computer work  [] (62159) Reviewed/Progressed HEP activities related to improving balance, coordination, kinesthetic sense, posture, motor skill, proprioception of scapular, scapulothoracic and UE control with self care, reaching, carrying, lifting, house/yardwork, driving/computer work    Access Code: [email or web address]  URL: [employer]/  Date: 10/11/2022  Prepared by: Theresa Collier    Exercises  Scaption Wall Slide with Towel - 1 x daily - 7 x weekly - 10 reps - 1 sets - 10 hold  Standing Shoulder Abduction Wall Slide with Thumb Out - 1 x daily - 7 x weekly - 1 sets - 10 reps - 10 hold  Sleeper Stretch - 1 x daily - 7 x weekly - 10 reps - 1 sets - 10 hold  Standing Shoulder Internal Rotation Stretch with Dowel - 1 x daily - 7 x weekly - 1 sets - 10 reps - 10 hold  Single Arm Doorway Pec Stretch at 90 Degrees Abduction - 1 x daily - 7 x weekly - 1 sets - 5 reps - 10 hold  Supine Shoulder External Rotation in Scaption AAROM - 1 x daily - 2 x weekly - 5 reps - 1 sets - 10 hold  Supine Scapular Protraction in Flexion with Dumbbells - 1 x daily - 7 x weekly - 3 sets - 10 reps  Shoulder Internal Rotation with Resistance - 1 x daily - 7 x weekly - 3 sets - 10 reps  Shoulder extension with resistance - Neutral - 1 x daily - 7 x weekly - 3 sets - 10 reps  Scapular Retraction with Resistance - 1 x daily - 7 x weekly - 3 sets - 10 reps  Isometric Shoulder Abduction at Wall - 1 x daily - 7 x weekly - 1 sets - 10 reps - 10 hold  Sidelying Shoulder External Rotation - 1 x daily - 7 x weekly - 3 sets - 10 reps  Supine Shoulder Flexion Extension Full Range AROM - 1 x daily - 7 x weekly - 1 reps - 1 sets  Standing Bicep Curls Supinated with Dumbbells - 1 x daily - 3 x weekly - 3 sets - 10 reps  Standing Tricep Extensions with Resistance - 1 x daily - 3 x weekly - 3 sets - 10 reps    Ball on wall and standing scap added 10/18      Manual Treatments:  PROM / STM / Oscillations-Mobs:  G-I, II, III, IV (PA's, Inf., Post.)  [] (21301) Provided manual therapy to mobilize soft tissue/joints of cervical/CT, scapular GHJ and UE for the purpose of modulating pain, promoting relaxation,  increasing ROM, reducing/eliminating soft tissue swelling/inflammation/restriction, improving soft tissue extensibility and allowing for proper ROM for normal function with self care, reaching, carrying, lifting, house/yardwork, driving/computer work    Modalities: will ice on way home  Charges:  Timed Code Treatment Minutes: 46   Total Treatment Minutes: 50     [] EVAL (LOW) 23667   [] EVAL (MOD) 58653   [] EVAL (HIGH) 62004   [] RE-EVAL   [x] AD(79485) x  1  [] IONTO  [x] NMR (92416) x  1   [] VASO  [] Manual (40710) x      [] Other:  [x] TA x1      [] Mech Traction (48273)  [] ES(attended) (89490)      [] ES (un) (47388):       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 Independent in HEP and progression per patient tolerance, in order to prevent re-injury. [] Progressing: [x] Met: [] Not Met: [] Adjusted   2.  Patient will have a decrease in pain to facilitate improvement in movement, function, and ADLs as indicated by Functional Deficits. [] Progressing: [x] Met: [] Not Met: [] Adjusted     Long Term Goals: To be achieved in: 5-6 months  1. Functional index score of 65% or more for the FOTO to assist with reaching prior level of function. [x] Progressing: [] Met: [x] Not Met: [] Adjusted  2. Patient will demonstrate increased L shoulder AROM to  155 flex, 170 abd, and ER at 90 to 90 degrees to allow for proper joint functioning as indicated by patients Functional Deficits. [x] Progressing: [] Met: [x] Not Met: [] Adjusted  3. Patient will demonstrate an increase in  L shoulder strength to 4/5 flex, abd, ER in UE to allow for proper functional mobility as indicated by patients Functional Deficits. [x] Progressing: [] Met: [x] Not Met: [] Adjusted  4. Patient will return to ADLs above shoulder height without increased symptoms or restriction. [x] Progressing: [] Met: [x] Not Met: [] Adjusted  5. 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 [x] Progressing: [] Met: [x] Not Met: [] Adjusted         Overall Progression Towards Functional goals/ Treatment Progress Update:  [x] Patient is progressing as expected towards functional goals listed. [] Progression is slowed due to complexities/Impairments listed. [] Progression has been slowed due to co-morbidities. [] Plan just implemented, too soon to assess goals progression <30days   [] Goals require adjustment due to lack of progress  [] Patient is not progressing as expected and requires additional follow up with physician  [] Other    Prognosis for POC: [x] Good [] Fair  [] Poor      Patient requires continued skilled intervention: [x] Yes  [] No      ASSESSMENT:    Treatment/Activity Tolerance:  [x] Patient tolerated treatment well [] Patient limited by fatique  [] Patient limited by pain  [] Patient limited by other medical complications  [x] Other: Pt tolerated treatment session well today.  Pt was able to increase resistance to blue theraband for RTC and periscapular strengthening. Progressed deltoid retraining exercise inclining the plinth to 45 degrees which appropriately challenged the pt. Added prone Ts and hitch hiker ER which pt tolerated well. Progressed ball on wall to circles without adverse effects. Return to Play: (if applicable)   []  Stage 1: Intro to Strength   []  Stage 2: Return to Run and Strength   []  Stage 3: Return to Jump and Strength   []  Stage 4: Dynamic Strength and Agility   []  Stage 5: Sport Specific Training   []  Ready to Return to Play, Meets All Above Stages   []  Not Ready for Return to Sports   Comments:            Prognosis: [x] Good [] Fair  [] Poor    Patient Requires Follow-up: [x] Yes  [] No    PLAN: 1-2x/week for 6 weeks (9/30/22); consider Er upper cut iso, body blade ER/IR; progress note  [x] Continue per plan of care [] Alter current plan (see comments above)  [] Plan of care initiated [] Hold pending MD visit [] Discharge    Note: If patient does not return for scheduled/ recommended follow up visits, this note will serve as a discharge from care along with most recent update on progress.      Electronically signed by: Neo Connor, PT, DPT

## 2022-11-01 ENCOUNTER — HOSPITAL ENCOUNTER (OUTPATIENT)
Dept: PHYSICAL THERAPY | Age: 62
Setting detail: THERAPIES SERIES
Discharge: HOME | End: 2022-11-01
Payer: COMMERCIAL

## 2022-11-01 PROCEDURE — 97530 THERAPEUTIC ACTIVITIES: CPT | Performed by: PHYSICAL THERAPIST

## 2022-11-01 PROCEDURE — 97110 THERAPEUTIC EXERCISES: CPT | Performed by: PHYSICAL THERAPIST

## 2022-11-01 PROCEDURE — 97112 NEUROMUSCULAR REEDUCATION: CPT | Performed by: PHYSICAL THERAPIST

## 2022-11-01 NOTE — PLAN OF CARE
[address] and [address] [place], [place] [address]R[address] [place] [address], [facility] [address]pe [address]  Phone: [phone]   Fax: [phone]                                            Physical Therapy Re-Certification Plan of Carrie hSine      Dear  Dr. Danuta Rogers,    We had the pleasure of treating the following patient for physical therapy services at [address] [place] [address]. A summary of our findings can be found in the updated assessment below. This includes our plan of care. If you have any questions or concerns regarding these findings, please do not hesitate to contact me at the office phone number checked above. Thank you for the referral.     Physician Signature:________________________________Date:__________________  By signing above (or electronic signature), therapists plan is approved by physician    Total Visits to Date: 15  Overall Response to Treatment:   [x]Patient is responding well to treatment and improvement is noted with regards  to goals   []Patient should continue to improve in reasonable time if they continue HEP   []Patient has plateaued and is no longer responding to skilled PT intervention    []Patient is getting worse and would benefit from return to referring MD   []Patient unable to adhere to initial POC   [x]Other: Pt continues to do well 14.5 weeks post RCR. He demonstrates full AROM of L shoulder with improvement since last session. His L shoulder strength continues to improve shown by improving MMT and being able to progress resistance and to more strengthening each PT visit. He no longer has pain with self care or household ADLs. 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 Did decrease resistance on a couple exercises today due to the soreness he experienced after last session.  He tolerated today's session well. Did have some clicking anterior shoulder with TB IR that did not improve after cuing for posture and technique. Physical Therapy Daily Treatment Note  Date:  2022    Patient Name:  Lilly Garcia    :  1960  MRN: [id number]    Medical/Treatment Diagnosis Information:  Diagnosis: s/p L shoulder rotator cuff repair and bicep tenodesis on 22; S46.012D (ICD-10-CM) - Traumatic tear of left rotator cuff, unspecified tear extent, subsequent encounter  Treatment Diagnosis: M25.512 (L) shoulder pain; M62.81 muscle weakness  Insurance/Certification information:  PT Insurance Information: [place]- 50 visits, no auth, $30 copay  Physician Information:   Dr. Emerald Araujo  Has the plan of care been signed (Y/N):        [x]  Yes  []  No     Date of Patient follow up with Physician: 23      Is this a Progress Report:     [x]  Yes  []  No        If Yes:  Date Range for reporting period:  Beginning- 2022   Ending-22    Progress report will be due (10 Rx or 30 days whichever is less): 43      Recertification will be due (POC Duration  / 90 days whichever is less): as above        Visit # Insurance Allowable Auth Required   15 50 []  Yes [x]  No        Functional Scale: FOTO- 67    Date assessed:  2022      Latex Allergy:  [x]NO      []YES  Preferred Language for Healthcare:   [x]English       []other:      Pain level:  0/10  on 2022    SUBJECTIVE:  pt is 14.5 weeks post op. Pt was sore the night and day after last session up to 4/10 but got better after this. He said 5 lb for SL ER was too sore so went back to 2 lb at home. He said his back hurts today and last week also. No pain in shoulder today. Pt reports he is able to reach into cabinets overhead and put dishes away without a problem. No pain getting dressed or with self care. He reports he did lift 1 gallon of milk over a fence at shoulder height recently and had no pain with this.      OBJECTIVE:   Observation:   Test measurements:     CERV ROM       Cervical Flexion       Cervical Extension       Cervical SB       Cervical rotation                     ROM PROM AROM-  Comment     L-  R L-11/1/22 R     Flexion    160  155     Abduction    177  165     ER at side    90  96     ER at 90 abd    96  102     BB IR      T10  T9     IR at 90 abd     61  60     Other             Other                    Strength-  L-11/1/22 R Comment   Flexion  4  4+     Abduction  4  4     ER  4+  4+     IR  5  5     Supraspinatus         Upper Trap         Lower Trap         Mid Trap         Rhomboids         Biceps  5  5     Triceps  5  5     Horizontal Abduction         Horizontal Adduction         Lats            Orthopedic Special Tests: deferred  Special Tests Left Right   A[place]y Scratch IR:  ER:   Cross body: IR:  ER:  Cross Body:   Neer's       Full Can       Empty Can       [place]       Nerve Tension Testing       Speed's       Osuna's        Spurling's       Repeated Scaption                        Reflexes/Sensation (myotomes/dermatomes): n/t     Joint mobility: n/t              []Normal                       []Hypo              []Hyper     Palpation: mild tender distal infraspinatus and teres minor; otherwise non-tender     Functional Mobility/Transfers: Indep     Posture: fair     Bandages/Dressings/Incisions:  pt reports healing well  Gait: (include devices/WB status) Indep    RESTRICTIONS/PRECAUTIONS: HTN, HLD; he also has some back pain also. Carpal tunnel L UE.      Exercises/Interventions:     Exercise/Equipment Resistance/Repetitions Other comments   Aerobic Conditioning     Aerodyne          Stretching/PROM     Er in doorway at 90 abd 5 x 10 secs    ER supine 1   Cane ER- seated at side     Wand flex     Towel press up     Passive shoulder flex with opposite UE assist     Ball roll on table    Ball on wall for shoulder flex    Elbow PROM    Side-lying flex     Table Slides    Wall slides     UE [place]    Pulleys     Pendulum hold/forward bow     BB IR 10 x 10 secs vertical    SL IR 10x 10 secs against wall 10/18    Pec doorway stretch     CBA stretch     UT stretch Shown for HEP    LS stretch Shown for HEP    Isometrics     Retraction        Weight shift     Flexion     Abduction    External Rotation    Internal Rotation    Biceps     Triceps          PRE's     scaption 3 x 10 3 lbs    Abduction     External Rotation SL 3 x 10 3 lbs    Internal Rotation     Shrugs    Prone EXT    Reverse Flys- prone Ts 2x10, 2 secs hold Off plinth    Prone Ys     Bent over row    Serratus 3 x 10 10 lb B   Horizontal Abd with ER     Biceps Triceps Retraction     Supination/pronation    Wrist flex/extn     External rotation in abduction (hitch hiker) 3x10, 4 lb  L only   Cable Column/Theraband     External rotation to upper cut isometric 10 x 5 secs yellow TB    External Rotation     Internal Rotation 3 x 10 blue TB    Shrugs     Lats     Ext 2 x 10 blue TB hold 2 secs    Flex     Scapular Retraction 2x10 50 lbs with row attachment  CC    BIC     TRIC    PNF - D2 flex     PNF- D2 extn     [place] flexion/deltoid retraining 5 mins supine, full arc, 2 lb Plinth incline to 45 deg   Dynamic Stability     Ball on wall 3 x 30 each CW/CCW    Push ups on wall     Push ups on ball on wall     90/90 ball taps     Horizontal abd ball taps     Body blade ER/IR at side npv    WBing shoulder taps Side of table 2 x 10 B    120 overhead ball taps (Y position)     [place]ack          Manual treatment:                           Patient education: Pt was educated on PT diagnosis, prognosis, and plan of care. Pt was educated on the use of ice throughout the day. Pt educated on doing light scar massage at incisions to decrease tightness. Pt was educated on signs/symptoms of infection and to call with any questions or concerns. Pt educated on post-op dressings, donning/doffing sling, dressing, precautions and restrictions.      Therapeutic Exercise and NMR EXR  [x] (92995) Provided verbal/tactile cueing for activities related to strengthening, flexibility, endurance, ROM  for improvements in scapular, scapulothoracic and UE control with self care, reaching, carrying, lifting, house/yardwork, driving/computer work.    [] (32578) Provided verbal/tactile cueing for activities related to improving balance, coordination, kinesthetic sense, posture, motor skill, proprioception  to assist with  scapular, scapulothoracic and UE control with self care, reaching, carrying, lifting, house/yardwork, driving/computer work. Therapeutic Activities:    [x] (61717 or 72720) Provided verbal/tactile cueing for activities related to improving balance, coordination, kinesthetic sense, posture, motor skill, proprioception and motor activation to allow for proper function of scapular, scapulothoracic and UE control with self care, carrying, lifting, driving/computer work. Home Exercise Program:    [x] (68106) Reviewed/Progressed HEP activities related to strengthening, flexibility, endurance, ROM of scapular, scapulothoracic and UE control with self care, reaching, carrying, lifting, house/yardwork, driving/computer work  [] (88847) Reviewed/Progressed HEP activities related to improving balance, coordination, kinesthetic sense, posture, motor skill, proprioception of scapular, scapulothoracic and UE control with self care, reaching, carrying, lifting, house/yardwork, driving/computer work    Access Code: [email or web address]  URL: [email or web address]/  Date: 11/01/2022  Prepared by: Felton Miguel    Exercises  Scaption Wall Slide with Towel - 1 x daily - 7 x weekly - 1 sets - 10 reps - 10 hold  Standing Shoulder Abduction Wall Slide with Thumb Out - 1 x daily - 7 x weekly - 1 sets - 10 reps - 10 hold  Sleeper Stretch - 1 x daily - 7 x weekly - 1 sets - 10 reps - 10 hold  Standing Shoulder Internal Rotation Stretch with Dowel - 1 x daily - 7 x weekly - 1 sets - 10 reps - 10 hold  Single Arm Doorway Pec Stretch at 90 Degrees Abduction - 1 x daily - 7 x weekly - 1 sets - 5 reps - 10 hold  Supine Scapular Protraction in Flexion with Dumbbells - 1 x daily - 3 x weekly - 3 sets - 10 reps  Shoulder extension with resistance - Neutral - 1 x daily - 3 x weekly - 3 sets - 10 reps  Scapular Retraction with Resistance - 1 x daily - 3 x weekly - 3 sets - 10 reps  Shoulder Internal Rotation with Resistance - 1 x daily - 3 x weekly - 3 sets - 10 reps  Sidelying Shoulder External Rotation - 1 x daily - 3 x weekly - 3 sets - 10 reps  Supine Shoulder Flexion Extension Full Range AROM - 1 x daily - 3 x weekly - 1 sets - 1 reps  Seated Shoulder External Rotation in Abduction Supported with Dumbbell - 1 x daily - 3 x weekly - 3 sets - 10 reps  Scaption with Dumbbells - 1 x daily - 3 x weekly - 3 sets - 10 reps  Standing Wall [employer] with Mini Swiss Ball - 1 x daily - 3 x weekly - 3 sets - 30 reps  Standing Bicep Curls Supinated with Dumbbells - 1 x daily - 3 x weekly - 3 sets - 10 reps  Standing Tricep Extensions with Resistance - 1 x daily - 3 x weekly - 3 sets - 10 reps        Manual Treatments:  PROM / STM / Oscillations-Mobs:  G-I, II, III, IV (PA's, Inf., Post.)  [] (30759) Provided manual therapy to mobilize soft tissue/joints of cervical/CT, scapular GHJ and UE for the purpose of modulating pain, promoting relaxation,  increasing ROM, reducing/eliminating soft tissue swelling/inflammation/restriction, improving soft tissue extensibility and allowing for proper ROM for normal function with self care, reaching, carrying, lifting, house/yardwork, driving/computer work    Modalities: will ice on way home  Charges:  Timed Code Treatment Minutes: 45   Total Treatment Minutes: 50     [] EVAL (LOW) 26504   [] EVAL (MOD) 54398   [] EVAL (HIGH) 20886   [] RE-EVAL   [x] WR(13554) x  1  [] IONTO  [x] NMR (53548) x  1   [] VASO  [] Manual (89489) x      [] Other:  [x] TA x1      [] Mech Traction (82875)  [] ES(attended) (06351) [] ES (un) (37843):       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 Independent in HEP and progression per patient tolerance, in order to prevent re-injury. [] Progressing: [x] Met: [] Not Met: [] Adjusted   2. Patient will have a decrease in pain to facilitate improvement in movement, function, and ADLs as indicated by Functional Deficits. [] Progressing: [x] Met: [] Not Met: [] Adjusted     Long Term Goals: To be achieved in: 5-6 months  1. Functional index score of 65% or more for the FOTO to assist with reaching prior level of function. [] Progressing: [x] Met: [] Not Met: [] Adjusted  2. Patient will demonstrate increased L shoulder AROM to  155 flex, 170 abd, and ER at 90 to 90 degrees to allow for proper joint functioning as indicated by patients Functional Deficits. [] Progressing: [x] Met: [] Not Met: [] Adjusted  3. Patient will demonstrate an increase in  L shoulder strength to 4+/5 flex, abd, ER in UE to allow for proper functional mobility as indicated by patients Functional Deficits. 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 Patient will return to ADLs above shoulder height without increased symptoms or restriction. [] Progressing: [x] Met: [] Not Met: [] Adjusted  5. 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 [x] Progressing: [] Met: [x] Not Met: [] Adjusted         Overall Progression Towards Functional goals/ Treatment Progress Update:  [x] Patient is progressing as expected towards functional goals listed. [] Progression is slowed due to complexities/Impairments listed. [] Progression has been slowed due to co-morbidities.   [] Plan just implemented, too soon to assess goals progression <30days   [] Goals require adjustment due to lack of progress  [] Patient is not progressing as expected and requires additional follow up with physician  [] Other    Prognosis for POC: [x] Good [] Fair  [] Poor      Patient requires continued skilled intervention: [x] Yes  [] No      ASSESSMENT:    Treatment/Activity Tolerance:  [x] Patient tolerated treatment well [] Patient limited by fatique  [] Patient limited by pain  [] Patient limited by other medical complications  [x] Other: Pt continues to do well 14.5 weeks post RCR. He demonstrates full AROM of L shoulder with improvement since last session. His L shoulder strength continues to improve shown by improving MMT and being able to progress resistance and to more strengthening each PT visit. He no longer has pain with self care or household ADLs. 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 Did decrease resistance on a couple exercises today due to the soreness he experienced after last session. He tolerated today's session well. Did have some clicking anterior shoulder with TB IR that did not improve after cuing for posture and technique.    Return to Play: (if applicable)   []  Stage 1: Intro to Strength   []  Stage 2: Return to Run and Strength   []  Stage 3: Return to Jump and Strength   []  Stage 4: Dynamic Strength and Agility   []  Stage 5: Sport Specific Training   []  Ready to Return to Play, Meets All Above Stages   []  Not Ready for Return to Sports   Comments:            Prognosis: [x] Good [] Fair  [] Poor    Patient Requires Follow-up: [x] Yes  [] No    PLAN: 1-2x/week for 6 weeks (11/1/22); consider body blade ER/IR, push up on wall  [x] Continue per plan of care [] Alter current plan (see comments above)  [] Plan of care initiated [] Hold pending MD visit [] Discharge    Note: If patient does not return for scheduled/ recommended follow up visits, this note will serve as a discharge from care along with most recent update on progress.      Electronically signed by: Vivek Palacios PT, DPT

## 2022-11-08 ENCOUNTER — APPOINTMENT (OUTPATIENT)
Dept: PHYSICAL THERAPY | Age: 62
End: 2022-11-08
Payer: COMMERCIAL

## 2022-11-11 ENCOUNTER — HOSPITAL ENCOUNTER (OUTPATIENT)
Dept: PHYSICAL THERAPY | Age: 62
Setting detail: THERAPIES SERIES
Discharge: HOME | End: 2022-11-11
Payer: COMMERCIAL

## 2022-11-11 PROCEDURE — 97530 THERAPEUTIC ACTIVITIES: CPT | Performed by: PHYSICAL THERAPIST

## 2022-11-11 PROCEDURE — 97110 THERAPEUTIC EXERCISES: CPT | Performed by: PHYSICAL THERAPIST

## 2022-11-11 PROCEDURE — 97112 NEUROMUSCULAR REEDUCATION: CPT | Performed by: PHYSICAL THERAPIST

## 2022-11-11 NOTE — FLOWSHEET NOTE
[address], [place] [address] [address], Meta Goldmann, [address] [place], [place] [address]   Phone: [phone]   Fax: [phone]                                              Physical Therapy Daily Treatment Note  Date:  2022    Patient Name:  Pamela Smith    :  1960  MRN: [id number]    Medical/Treatment Diagnosis Information:  Diagnosis: s/p L shoulder rotator cuff repair and bicep tenodesis on 22; S46.012D (ICD-10-CM) - Traumatic tear of left rotator cuff, unspecified tear extent, subsequent encounter  Treatment Diagnosis: M25.512 (L) shoulder pain; M62.81 muscle weakness  Insurance/Certification information:  PT Insurance Information: [employer]- 50 visits, no auth, $30 copay  Physician Information:   Dr. Bhaskar Davis  Has the plan of care been signed (Y/N):        [x]  Yes  []  No     Date of Patient follow up with Physician: 23      Is this a Progress Report:     [x]  Yes  []  No        If Yes:  Date Range for reporting period:  Beginning- 2022   Ending-22    Progress report will be due (10 Rx or 30 days whichever is less):       Recertification will be due (POC Duration  / 90 days whichever is less): as above        Visit # Insurance Allowable Auth Required   16 50 []  Yes [x]  No        Functional Scale: FOTO- 67    Date assessed:  2022      Latex Allergy:  [x]NO      []YES  Preferred Language for Healthcare:   [x]English       []other:      Pain level:  0/10  on 2022    SUBJECTIVE:  pt is almost 4 months post op. Pt was not as sore after last session as he was the time before. He has been doing exercises at home and going well. Shoulder still feeling good.    OBJECTIVE:   Observation:   Test measurements:     CERV ROM       Cervical Flexion       Cervical Extension       Cervical SB       Cervical rotation                     ROM PROM AROM-  Comment     L-  R L-22 R     Flexion    160 155     Abduction    177  165     ER at side    90  96     ER at 90 abd    96  102     BB IR      T10  T9     IR at 90 abd     61  60     Other             Other                    Strength-  L-11/1/22 R Comment   Flexion  4  4+     Abduction  4  4     ER  4+  4+     IR  5  5     Supraspinatus         Upper Trap         Lower Trap         Mid Trap         Rhomboids         Biceps  5  5     Triceps  5  5     Horizontal Abduction         Horizontal Adduction         Lats            Orthopedic Special Tests: deferred  Special Tests Left Right   [place] Scratch IR:  ER:   Cross body: IR:  ER:  Cross Body:   Neer's       Full Can       Empty Can       [place] Bonus       Nerve Tension Testing       Speed's       Osuna's        Spurling's       Repeated Scaption                        Reflexes/Sensation (myotomes/dermatomes): n/t     Joint mobility: n/t              []Normal                       []Hypo              []Hyper     Palpation: mild tender distal infraspinatus and teres minor; otherwise non-tender     Functional Mobility/Transfers: Indep     Posture: fair     Bandages/Dressings/Incisions:  pt reports healing well  Gait: (include devices/WB status) Indep    RESTRICTIONS/PRECAUTIONS: HTN, HLD; he also has some back pain also. Carpal tunnel L UE.      Exercises/Interventions:     Exercise/Equipment Resistance/Repetitions Other comments   Aerobic Conditioning     Aerodyne          Stretching/PROM     Er in doorway at 90 abd 5 x 10 secs    ER supine 1   Cane ER- seated at side     Wand flex     Towel press up     Passive shoulder flex with opposite UE assist     Ball roll on table    Ball on wall for shoulder flex    Elbow PROM    Side-lying flex     Table Slides    Wall slides     UE [place]    Pulleys     Pendulum hold/forward bow     BB IR 10 x 10 secs vertical    SL IR 1   Pec doorway stretch     CBA stretch     UT stretch Shown for HEP    LS stretch Shown for HEP    Isometrics     Retraction        Weight shift     Flexion     Abduction    External Rotation    Internal Rotation    Biceps     Triceps          PRE's     scaption 3 x 10 3 lbs    Abduction     External Rotation SL 3 x 10 4 lbs    Internal Rotation     Shrugs    Prone EXT    Reverse Flys- prone Ts 2x10, 2 secs hold- in ER, 2 lbs Off plinth    Prone Ys     Bent over row    Serratus 3 x 10 10 lb B   Horizontal Abd with ER     Biceps 3 x 10 8 lbs B   Triceps Kick back 8 lbs Retraction     Supination/pronation    Wrist flex/extn     External rotation in abduction (hitch hiker) 3x10, 6 lb  L only   Cable Column/Theraband     External rotation to upper cut isometric 10 x 10 secs yellow TB    External Rotation     Internal Rotation 3 x 10 orange TB    Shrugs     Lats     Ext 3 x 10 orange TB hold 2 secs    Flex     Scapular Retraction 3x10 80 lbs high row with large handles attachment  CC    BIC     TRIC    PNF - D2 flex     PNF- D2 extn     [place] flexion/deltoid retraining Plinth incline to 45 deg   Dynamic Stability     Ball on wall 3 x 30 each CW/CCW, 2 lb    Push ups on wall 3 x 10     Push ups on ball on wall     90/90 ball taps     Horizontal abd ball taps     Body blade ER/IR at side 3 x 20 secs    WBing shoulder taps    120 overhead ball taps (Y position)     Plyoback          Manual treatment:                           Patient education: Pt was educated on PT diagnosis, prognosis, and plan of care. Pt was educated on the use of ice throughout the day. Pt educated on doing light scar massage at incisions to decrease tightness. Pt was educated on signs/symptoms of infection and to call with any questions or concerns. Pt educated on post-op dressings, donning/doffing sling, dressing, precautions and restrictions.      Therapeutic Exercise and NMR EXR  [x] (55760) Provided verbal/tactile cueing for activities related to strengthening, flexibility, endurance, ROM  for improvements in scapular, scapulothoracic and UE control with self care, reaching, carrying, lifting, house/yardwork, driving/computer work.    [] (96331) Provided verbal/tactile cueing for activities related to improving balance, coordination, kinesthetic sense, posture, motor skill, proprioception  to assist with  scapular, scapulothoracic and UE control with self care, reaching, carrying, lifting, house/yardwork, driving/computer work. Therapeutic Activities:    [x] (56324 or 32130) Provided verbal/tactile cueing for activities related to improving balance, coordination, kinesthetic sense, posture, motor skill, proprioception and motor activation to allow for proper function of scapular, scapulothoracic and UE control with self care, carrying, lifting, driving/computer work. Home Exercise Program:    [x] (82615) Reviewed/Progressed HEP activities related to strengthening, flexibility, endurance, ROM of scapular, scapulothoracic and UE control with self care, reaching, carrying, lifting, house/yardwork, driving/computer work  [] (45413) Reviewed/Progressed HEP activities related to improving balance, coordination, kinesthetic sense, posture, motor skill, proprioception of scapular, scapulothoracic and UE control with self care, reaching, carrying, lifting, house/yardwork, driving/computer work    Access Code: [email or web address]  URL: [email or web address]  Date: 11/11/2022  Prepared by: Humberto Barney    Exercises  Scaption Wall Slide with Towel - 1 x daily - 7 x weekly - 1 sets - 5 reps - 10 hold  Standing Shoulder Abduction Wall Slide with Thumb Out - 1 x daily - 7 x weekly - 1 sets - 5 reps - 10 hold  Sleeper Stretch - 1 x daily - 7 x weekly - 1 sets - 5 reps - 10 hold  Standing Shoulder Internal Rotation Stretch with Dowel - 1 x daily - 7 x weekly - 1 sets - 5 reps - 10 hold  Single Arm Doorway Pec Stretch at 90 Degrees Abduction - 1 x daily - 7 x weekly - 1 sets - 5 reps - 10 hold  Shoulder extension with resistance - Neutral - 1 x daily - 3 x weekly - 3 sets - 10 reps  Scapular Retraction with Resistance - 1 x daily - 3 x weekly - 3 sets - 10 reps  Shoulder Internal Rotation with Resistance - 1 x daily - 3 x weekly - 3 sets - 10 reps  Sidelying Shoulder External Rotation - 1 x daily - 3 x weekly - 3 sets - 10 reps  Prone Shoulder Horizontal Abduction with External Rotation - 1 x daily - 3 x weekly - 2-3 sets - 10 reps - 2 hold  Shoulder External Rotation Reactive Isometrics - 1 x daily - 3 x weekly - 1 sets - 10 reps - 10 hold  Seated Shoulder External Rotation in Abduction Supported with Dumbbell - 1 x daily - 3 x weekly - 3 sets - 10 reps  Scaption with Dumbbells - 1 x daily - 3 x weekly - 3 sets - 10 reps  Standing Wall Ball Circles with Mini Swiss Ball - 1 x daily - 3 x weekly - 3 sets - 30 reps  Standing Bicep Curls Supinated with Dumbbells - 1 x daily - 3 x weekly - 3 sets - 10 reps  Standing Tricep Extensions with Resistance - 1 x daily - 3 x weekly - 3 sets - 10 reps  Standing Bent Over on Chair Single Arm Tricep Extension with Dumbbell - 1 x daily - 3 x weekly - 3 sets - 10 reps          Manual Treatments:  PROM / STM / Oscillations-Mobs:  G-I, II, III, IV (PA's, Inf., Post.)  [] (94609) Provided manual therapy to mobilize soft tissue/joints of cervical/CT, scapular GHJ and UE for the purpose of modulating pain, promoting relaxation,  increasing ROM, reducing/eliminating soft tissue swelling/inflammation/restriction, improving soft tissue extensibility and allowing for proper ROM for normal function with self care, reaching, carrying, lifting, house/yardwork, driving/computer work    Modalities: will ice on way home  Charges:  Timed Code Treatment Minutes: 42   Total Treatment Minutes: 43     [] EVAL (LOW) 22605   [] EVAL (MOD) 51090   [] EVAL (HIGH) 57702   [] RE-EVAL   [x] UP(85558) x  1  [] IONTO  [x] NMR (92590) x  1   [] VASO  [] Manual (07731) x      [] Other:  [x] TA x1      [] Mech Traction (89318)  [] ES(attended) (88803)      [] ES (un) (30594):       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 Independent in HEP and progression per patient tolerance, in order to prevent re-injury. [] Progressing: [x] Met: [] Not Met: [] Adjusted   2. Patient will have a decrease in pain to facilitate improvement in movement, function, and ADLs as indicated by Functional Deficits. [] Progressing: [x] Met: [] Not Met: [] Adjusted     Long Term Goals: To be achieved in: 5-6 months  1. Functional index score of 65% or more for the FOTO to assist with reaching prior level of function. [] Progressing: [x] Met: [] Not Met: [] Adjusted  2. Patient will demonstrate increased L shoulder AROM to  155 flex, 170 abd, and ER at 90 to 90 degrees to allow for proper joint functioning as indicated by patients Functional Deficits. [] Progressing: [x] Met: [] Not Met: [] Adjusted  3. Patient will demonstrate an increase in  L shoulder strength to 4+/5 flex, abd, ER in UE to allow for proper functional mobility as indicated by patients Functional Deficits. 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 Patient will return to ADLs above shoulder height without increased symptoms or restriction. [] Progressing: [x] Met: [] Not Met: [] Adjusted  5. 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 [x] Progressing: [] Met: [x] Not Met: [] Adjusted         Overall Progression Towards Functional goals/ Treatment Progress Update:  [x] Patient is progressing as expected towards functional goals listed. [] Progression is slowed due to complexities/Impairments listed. [] Progression has been slowed due to co-morbidities.   [] Plan just implemented, too soon to assess goals progression <30days   [] Goals require adjustment due to lack of progress  [] Patient is not progressing as expected and requires additional follow up with physician  [] Other    Prognosis for POC: [x] Good [] Fair  [] Poor      Patient requires continued skilled intervention: [x] Yes  [] No      ASSESSMENT:    Treatment/Activity Tolerance:  [x] Patient tolerated treatment well [] Patient limited by fatique  [] Patient limited by pain  [] Patient limited by other medical complications  [x] Other: pt was progressed to several new exercises today. He was also able to progress resistance on other exercises throughout session. Pt tolerated well with expected fatigued at end of session. Pt given updated HEP. He will continue to be progressed in PT as tolerates. Return to Play: (if applicable)   []  Stage 1: Intro to Strength   []  Stage 2: Return to Run and Strength   []  Stage 3: Return to Jump and Strength   []  Stage 4: Dynamic Strength and Agility   []  Stage 5: Sport Specific Training   []  Ready to Return to Play, Meets All Above Stages   []  Not Ready for Return to Sports   Comments:            Prognosis: [x] Good [] Fair  [] Poor    Patient Requires Follow-up: [x] Yes  [] No    PLAN: 1-2x/week for 6 weeks (11/1/22); consider ball taps, 90/90 weight carries, standing abd, Body blade in scap, push ups on SB  [x] Continue per plan of care [] Alter current plan (see comments above)  [] Plan of care initiated [] Hold pending MD visit [] Discharge    Note: If patient does not return for scheduled/ recommended follow up visits, this note will serve as a discharge from care along with most recent update on progress.      Electronically signed by: Keo Fontenot, PT, DPT

## 2022-11-15 ENCOUNTER — HOSPITAL ENCOUNTER (OUTPATIENT)
Dept: PHYSICAL THERAPY | Age: 62
Setting detail: THERAPIES SERIES
Discharge: HOME | End: 2022-11-15
Payer: COMMERCIAL

## 2022-11-15 PROCEDURE — 97530 THERAPEUTIC ACTIVITIES: CPT | Performed by: PHYSICAL THERAPIST

## 2022-11-15 PROCEDURE — 97110 THERAPEUTIC EXERCISES: CPT | Performed by: PHYSICAL THERAPIST

## 2022-11-15 PROCEDURE — 97112 NEUROMUSCULAR REEDUCATION: CPT | Performed by: PHYSICAL THERAPIST

## 2022-11-15 NOTE — FLOWSHEET NOTE
[address], [place] [address] [place] [address]  Phone: [phone]   Fax: [phone]                                              Physical Therapy Daily Treatment Note  Date:  11/15/2022    Patient Name:  Lolis Felder    :  1960  MRN: [id number]    Medical/Treatment Diagnosis Information:  Diagnosis: s/p L shoulder rotator cuff repair and bicep tenodesis on 22; S46.012D (ICD-10-CM) - Traumatic tear of left rotator cuff, unspecified tear extent, subsequent encounter  Treatment Diagnosis: M25.512 (L) shoulder pain; M62.81 muscle weakness  Insurance/Certification information:  PT Insurance Information: [place]- 50 visits, no auth, $30 copay  Physician Information:   Dr. Lilly Georges  Has the plan of care been signed (Y/N):        [x]  Yes  []  No     Date of Patient follow up with Physician: 23      Is this a Progress Report:     [x]  Yes  []  No        If Yes:  Date Range for reporting period:  Beginning- 2022   Ending-22    Progress report will be due (10 Rx or 30 days whichever is less):       Recertification will be due (POC Duration  / 90 days whichever is less): as above        Visit # Insurance Allowable Auth Required   17 50 []  Yes [x]  No        Functional Scale: FOTO- 67    Date assessed:  2022      Latex Allergy:  [x]NO      []YES  Preferred Language for Healthcare:   [x]English       []other:      Pain level:  0/10  on 11/15/2022    SUBJECTIVE:  pt is 4 months post op. Pt was sore posterior-lateral shoulder after last session. He has been replacing a wall in a barn also. He has been helping lifting 2x6x12 boards. Still a little sore. He does have a lot of popping in shoulder with Tb IR.    OBJECTIVE:   Observation:   Test measurements:     CERV ROM       Cervical Flexion       Cervical Extension       Cervical SB       Cervical rotation ROM PROM AROM-  Comment     L-  R L-11/1/22 R     Flexion    160  155     Abduction    177  165     ER at side    90  96     ER at 90 abd    96  102     BB IR      T10  T9     IR at 90 abd     61  60     Other             Other                    Strength-  L-11/1/22 R Comment   Flexion  4  4+     Abduction  4  4     ER  4+  4+     IR  5  5     Supraspinatus         Upper Trap         Lower Trap         Mid Trap         Rhomboids         Biceps  5  5     Triceps  5  5     Horizontal Abduction         Horizontal Adduction         Lats            Orthopedic Special Tests: deferred  Special Tests Left Right   Apley Scratch IR:  ER:   Cross body: IR:  ER:  Cross Body:   Neer's       Full Can       Empty Can       Celester Lesvia       Nerve Tension Testing       Speed's       Osuna's        Spurling's       Repeated Scaption                        Reflexes/Sensation (myotomes/dermatomes): n/t     Joint mobility: n/t              []Normal                       []Hypo              []Hyper     Palpation: mild tender distal infraspinatus and teres minor; otherwise non-tender     Functional Mobility/Transfers: Indep     Posture: fair     Bandages/Dressings/Incisions:  pt reports healing well  Gait: (include devices/WB status) Indep    RESTRICTIONS/PRECAUTIONS: HTN, HLD; he also has some back pain also. Carpal tunnel L UE.      Exercises/Interventions:     Exercise/Equipment Resistance/Repetitions Other comments   Aerobic Conditioning     Aerodyne          Stretching/PROM     Er in doorway at 90 abd B 3 x 30  secs both arms    ER supine 1   Cane ER- seated at side     Wand flex     Towel press up     Passive shoulder flex with opposite UE assist     Ball roll on table    Ball on wall for shoulder flex    Elbow PROM    Side-lying flex     Table Slides    Wall slides     UE Roxton    Pulleys     Pendulum hold/forward bow     BB IR 10 x 10 secs vertical    SL IR 1   Pec doorway stretch     CBA stretch     UT stretch Shown for HEP    LS stretch Shown for HEP    Isometrics     Retraction        Weight shift     Flexion     Abduction    External Rotation    Internal Rotation    Biceps     Triceps          PRE's     scaption 2 x 10 3 lbs    Abduction 1x 10 3 lbs  Alt sets with scap   External Rotation SL 3 x 10 4 lbs    Internal Rotation     Shrugs    Prone EXT    Reverse Flys- prone Ts 2x10, 2 secs hold- in ER, 2 lbs Off plinth    Prone Ys     Bent over row    Serratus B   Horizontal Abd with ER     Biceps 3 x 10 8 lbs B   Triceps Retraction     Supination/pronation    Wrist flex/extn     External rotation in abduction (hitch hiker) 1x10, 6 lb  L only- increased clicking on 53/71 so will hold for now   Cable Column/Theraband     External rotation to upper cut isometric    External Rotation     Internal Rotation Holding 11/15 due to increased clicking   Shrugs     Lats     Ext 2 x 10 orange TB hold 2 secs    Flex     Scapular Retraction 3x10 70 lbs high row with large handles attachment  CC    BIC     TRIC 3 x 10 CC using B UEs 45 lbs    PNF - D2 flex     PNF- D2 extn     [place] flexion/deltoid retraining Plinth incline to 45 deg   Dynamic Stability     Ball on wall    Push ups on wall    Push ups on ball on wall 10 x 5 secs    90/90 ball taps     Horizontal abd ball taps     Body blade Scap at 90  3 x 20 secs    WBing shoulder taps    90/90 weight carries     120 overhead ball taps (Y position)     Plyoback          Manual treatment:                           Patient education: Pt was educated on PT diagnosis, prognosis, and plan of care. Pt was educated on the use of ice throughout the day. Pt educated on doing light scar massage at incisions to decrease tightness. Pt was educated on signs/symptoms of infection and to call with any questions or concerns. Pt educated on post-op dressings, donning/doffing sling, dressing, precautions and restrictions.      Therapeutic Exercise and NMR EXR  [x] (40459) Provided verbal/tactile cueing for activities related to strengthening, flexibility, endurance, ROM  for improvements in scapular, scapulothoracic and UE control with self care, reaching, carrying, lifting, house/yardwork, driving/computer work.    [] (20079) Provided verbal/tactile cueing for activities related to improving balance, coordination, kinesthetic sense, posture, motor skill, proprioception  to assist with  scapular, scapulothoracic and UE control with self care, reaching, carrying, lifting, house/yardwork, driving/computer work. Therapeutic Activities:    [x] (60649 or 74057) Provided verbal/tactile cueing for activities related to improving balance, coordination, kinesthetic sense, posture, motor skill, proprioception and motor activation to allow for proper function of scapular, scapulothoracic and UE control with self care, carrying, lifting, driving/computer work. Home Exercise Program:    [x] (90004) Reviewed/Progressed HEP activities related to strengthening, flexibility, endurance, ROM of scapular, scapulothoracic and UE control with self care, reaching, carrying, lifting, house/yardwork, driving/computer work  [] (30758) Reviewed/Progressed HEP activities related to improving balance, coordination, kinesthetic sense, posture, motor skill, proprioception of scapular, scapulothoracic and UE control with self care, reaching, carrying, lifting, house/yardwork, driving/computer work    Access Code: [email or web address]  URL: [email or web address]  Date: 11/11/2022  Prepared by: Monico Miranda    Exercises  Scaption Wall Slide with Towel - 1 x daily - 7 x weekly - 1 sets - 5 reps - 10 hold  Standing Shoulder Abduction Wall Slide with Thumb Out - 1 x daily - 7 x weekly - 1 sets - 5 reps - 10 hold  Sleeper Stretch - 1 x daily - 7 x weekly - 1 sets - 5 reps - 10 hold  Standing Shoulder Internal Rotation Stretch with Dowel - 1 x daily - 7 x weekly - 1 sets - 5 reps - 10 hold  Single Arm Doorway Pec Stretch at 90 Degrees Abduction - 1 x daily - 7 x weekly - 1 sets - 5 reps - 10 hold  Shoulder extension with resistance - Neutral - 1 x daily - 3 x weekly - 3 sets - 10 reps  Scapular Retraction with Resistance - 1 x daily - 3 x weekly - 3 sets - 10 reps  Shoulder Internal Rotation with Resistance - 1 x daily - 3 x weekly - 3 sets - 10 reps  Sidelying Shoulder External Rotation - 1 x daily - 3 x weekly - 3 sets - 10 reps  Prone Shoulder Horizontal Abduction with External Rotation - 1 x daily - 3 x weekly - 2-3 sets - 10 reps - 2 hold  Shoulder External Rotation Reactive Isometrics - 1 x daily - 3 x weekly - 1 sets - 10 reps - 10 hold  Seated Shoulder External Rotation in Abduction Supported with Dumbbell - 1 x daily - 3 x weekly - 3 sets - 10 reps  Scaption with Dumbbells - 1 x daily - 3 x weekly - 3 sets - 10 reps  Standing Wall Ball Circles with Mini Swiss Ball - 1 x daily - 3 x weekly - 3 sets - 30 reps  Standing Bicep Curls Supinated with Dumbbells - 1 x daily - 3 x weekly - 3 sets - 10 reps  Standing Tricep Extensions with Resistance - 1 x daily - 3 x weekly - 3 sets - 10 reps  Standing Bent Over on Chair Single Arm Tricep Extension with Dumbbell - 1 x daily - 3 x weekly - 3 sets - 10 reps          Manual Treatments:  PROM / STM / Oscillations-Mobs:  G-I, II, III, IV (PA's, Inf., Post.)  [] (38314) Provided manual therapy to mobilize soft tissue/joints of cervical/CT, scapular GHJ and UE for the purpose of modulating pain, promoting relaxation,  increasing ROM, reducing/eliminating soft tissue swelling/inflammation/restriction, improving soft tissue extensibility and allowing for proper ROM for normal function with self care, reaching, carrying, lifting, house/yardwork, driving/computer work    Modalities: will ice on way home  Charges:  Timed Code Treatment Minutes: 38   Total Treatment Minutes: 40     [] EVAL (LOW) 18184   [] EVAL (MOD) 88332   [] EVAL (HIGH) 03082   [] RE-EVAL   [x] IP(99879) x  1  [] IONTO  [x] NMR (73477) x  1   [] VASO  [] Manual (58347) x      [] Other:  [x] TA x1      [] Mech Traction (33882)  [] ES(attended) (42129)      [] ES (un) (03726):       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 Independent in HEP and progression per patient tolerance, in order to prevent re-injury. [] Progressing: [x] Met: [] Not Met: [] Adjusted   2. Patient will have a decrease in pain to facilitate improvement in movement, function, and ADLs as indicated by Functional Deficits. [] Progressing: [x] Met: [] Not Met: [] Adjusted     Long Term Goals: To be achieved in: 5-6 months  1. Functional index score of 65% or more for the FOTO to assist with reaching prior level of function. [] Progressing: [x] Met: [] Not Met: [] Adjusted  2. Patient will demonstrate increased L shoulder AROM to  155 flex, 170 abd, and ER at 90 to 90 degrees to allow for proper joint functioning as indicated by patients Functional Deficits. [] Progressing: [x] Met: [] Not Met: [] Adjusted  3. Patient will demonstrate an increase in  L shoulder strength to 4+/5 flex, abd, ER in UE to allow for proper functional mobility as indicated by patients Functional Deficits. 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 Patient will return to ADLs above shoulder height without increased symptoms or restriction. [] Progressing: [x] Met: [] Not Met: [] Adjusted  5. 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 [x] Progressing: [] Met: [x] Not Met: [] Adjusted         Overall Progression Towards Functional goals/ Treatment Progress Update:  [x] Patient is progressing as expected towards functional goals listed. [] Progression is slowed due to complexities/Impairments listed. [] Progression has been slowed due to co-morbidities.   [] Plan just implemented, too soon to assess goals progression <30days   [] Goals require adjustment due to lack of progress  [] Patient is not progressing as expected and requires additional follow up with physician  [] Other    Prognosis for POC: [x] Good [] Fair  [] Poor      Patient requires continued skilled intervention: [x] Yes  [] No      ASSESSMENT:    Treatment/Activity Tolerance:  [x] Patient tolerated treatment well [] Patient limited by fatique  [] Patient limited by pain  [] Patient limited by other medical complications  [x] Other: pt was more sore with exercises today so did not perform all exercises or increase resistance today. Did hold off on Tb IR and Er at 90 abd due to increased clicking in his shoulder with these recently. Did not advance HEP since he has been sore recently. Educated pt to return to icing and taking 2-3 days of rest between now and next strengthening session to allow for full recovery. He will otherwise continue with strengthening no more than every other day. Pt would like to follow up in 2 weeks to check on status to spread out visits. Return to Play: (if applicable)   []  Stage 1: Intro to Strength   []  Stage 2: Return to Run and Strength   []  Stage 3: Return to Jump and Strength   []  Stage 4: Dynamic Strength and Agility   []  Stage 5: Sport Specific Training   []  Ready to Return to Play, Meets All Above Stages   []  Not Ready for Return to Sports   Comments:            Prognosis: [x] Good [] Fair  [] Poor    Patient Requires Follow-up: [x] Yes  [] No    PLAN: 1-2x/week for 6 weeks (11/1/22); consider ball taps, 90/90 weight carries, standing abd  [x] Continue per plan of care [] Alter current plan (see comments above)  [] Plan of care initiated [] Hold pending MD visit [] Discharge    Note: If patient does not return for scheduled/ recommended follow up visits, this note will serve as a discharge from care along with most recent update on progress.      Electronically signed by: Portia Parr PT, DPT

## 2022-11-29 ENCOUNTER — HOSPITAL ENCOUNTER (OUTPATIENT)
Dept: PHYSICAL THERAPY | Age: 62
Setting detail: THERAPIES SERIES
Discharge: HOME | End: 2022-11-29
Payer: COMMERCIAL

## 2022-11-29 PROCEDURE — 97140 MANUAL THERAPY 1/> REGIONS: CPT | Performed by: PHYSICAL THERAPIST

## 2022-11-29 PROCEDURE — 97110 THERAPEUTIC EXERCISES: CPT | Performed by: PHYSICAL THERAPIST

## 2022-11-29 PROCEDURE — 97112 NEUROMUSCULAR REEDUCATION: CPT | Performed by: PHYSICAL THERAPIST

## 2022-11-29 NOTE — FLOWSHEET NOTE
[address], [place] [address] [address] [facility], [facility], [facility]  Phone: [phone]   Fax: [phone]                                              Physical Therapy Daily Treatment Note  Date:  2022    Patient Name:  Pepito Rodriguez    :  1960  MRN: [id number]    Medical/Treatment Diagnosis Information:  Diagnosis: s/p L shoulder rotator cuff repair and bicep tenodesis on 22; S46.012D (ICD-10-CM) - Traumatic tear of left rotator cuff, unspecified tear extent, subsequent encounter  Treatment Diagnosis: M25.512 (L) shoulder pain; M62.81 muscle weakness  Insurance/Certification information:  PT Insurance Information: [employer]- 50 visits, no auth, $30 copay  Physician Information:   Dr. Deloris Friedman  Has the plan of care been signed (Y/N):        [x]  Yes  []  No     Date of Patient follow up with Physician: 23      Is this a Progress Report:     [x]  Yes  []  No        If Yes:  Date Range for reporting period:  Beginning- 2022   Ending-22    Progress report will be due (10 Rx or 30 days whichever is less):       Recertification will be due (POC Duration  / 90 days whichever is less): as above        Visit # Insurance Allowable Auth Required   18 50 []  Yes [x]  No        Functional Scale: FOTO- 67    Date assessed:  2022      Latex Allergy:  [x]NO      []YES  Preferred Language for Healthcare:   [x]English       []other:      Pain level:  0/10  on 2022    SUBJECTIVE:  pt is 4 months post op. Pt was sore posterior-lateral shoulder after last session. He said he was been working it out and working which typically results in soreness later in the day. Other than that he has been feeling good and exercises are going well.      OBJECTIVE:   Observation:   Test measurements:     CERV ROM       Cervical Flexion       Cervical Extension       Cervical SB       Cervical rotation ROM PROM AROM-  Comment     L-  R L-11/1/22 R     Flexion    160  155     Abduction    177  165     ER at side    90  96     ER at 90 abd    96  102     BB IR      T10  T9     IR at 90 abd     61  60     Other             Other                    Strength-  L-11/1/22 R Comment   Flexion  4  4+     Abduction  4  4     ER  4+  4+     IR  5  5     Supraspinatus         Upper Trap         Lower Trap         Mid Trap         Rhomboids         Biceps  5  5     Triceps  5  5     Horizontal Abduction         Horizontal Adduction         Lats            Orthopedic Special Tests: deferred  Special Tests Left Right   Apley Scratch IR:  ER:   Cross body: IR:  ER:  Cross Body:   Neer's       Full Can       Empty Can       Naveen Marla       Nerve Tension Testing       Speed's       Osuna's        Spurling's       Repeated Scaption                        Reflexes/Sensation (myotomes/dermatomes): n/t     Joint mobility: n/t              []Normal                       []Hypo              []Hyper     Palpation: mild tender distal infraspinatus and teres minor; otherwise non-tender     Functional Mobility/Transfers: Indep     Posture: fair     Bandages/Dressings/Incisions:  pt reports healing well  Gait: (include devices/WB status) Indep    RESTRICTIONS/PRECAUTIONS: HTN, HLD; he also has some back pain also. Carpal tunnel L UE.      Exercises/Interventions:     Exercise/Equipment Resistance/Repetitions Other comments   Aerobic Conditioning     Aerodyne          Stretching/PROM     Er in doorway at 90 abd B 3 x 30  secs both arms    ER supine 1   Cane ER- seated at side     Wand flex     Towel press up     Passive shoulder flex with opposite UE assist     Ball roll on table    Ball on wall for shoulder flex    Elbow PROM    Side-lying flex     Table Slides    Wall slides     UE [place]    Pulleys     Pendulum hold/forward bow     BB IR 10 x 10 secs vertical    SL IR 1   Pec doorway stretch     CBA stretch     UT stretch [id number]    LS stretch [id number]     Isometrics     Retraction        Weight shift     Flexion     Abduction    External Rotation    Internal Rotation    Biceps     Triceps          PRE's     scaption 2 x 10 3 lbs    Abduction Held 11/29 d/t crepitus in shoulder to avoid tendonitis    External Rotation SL 3 x 10 4 lbs    Internal Rotation     Shrugs    Prone EXT    Reverse Flys- prone Ts Off plinth    Prone Ys     Bent over row    Serratus B   Horizontal Abd with ER     Biceps 3 x 10 8 lbs B   Triceps Retraction     Supination/pronation    Wrist flex/extn     External rotation in abduction (hitch hiker) L only- increased clicking on 68/86 + 48/35 so will hold for now   Cable Column/Theraband     External rotation to upper cut isometric    External Rotation     Internal Rotation    IR walkouts  Holding 11/15 due to increased clicking     Shrugs     Lats     Ext 2 x 10 orange TB hold 2 secs    Flex     Scapular Retraction 3x10 70 lbs high row with large handles attachment  CC    BIC     TRIC    PNF - D2 flex     PNF- D2 extn     HARSTAD flexion/deltoid retraining Plinth incline to 45 deg   Dynamic Stability     Ball on wall    Push ups on wall    Push ups on ball on wall 10 x 5 secs Green SB   90/90 ball taps     Horizontal abd ball taps     Body blade Scap at 90  3 x 20 secs    WBing shoulder taps    90/90 weight carries     120 overhead ball taps (Y position)     Plyoback          Manual treatment:     IASTM to L UT + LS 10'                     Patient education: Pt was educated on PT diagnosis, prognosis, and plan of care. Pt was educated on the use of ice throughout the day. Pt educated on doing light scar massage at incisions to decrease tightness. Pt was educated on signs/symptoms of infection and to call with any questions or concerns. Pt educated on post-op dressings, donning/doffing sling, dressing, precautions and restrictions.      Therapeutic Exercise and NMR EXR  [x] (01016) Provided verbal/tactile cueing x daily - 7 x weekly - 1 sets - 5 reps - 10 hold  Shoulder extension with resistance - Neutral - 1 x daily - 3 x weekly - 3 sets - 10 reps  Scapular Retraction with Resistance - 1 x daily - 3 x weekly - 3 sets - 10 reps  Shoulder Internal Rotation with Resistance - 1 x daily - 3 x weekly - 3 sets - 10 reps  Sidelying Shoulder External Rotation - 1 x daily - 3 x weekly - 3 sets - 10 reps  Prone Shoulder Horizontal Abduction with External Rotation - 1 x daily - 3 x weekly - 2-3 sets - 10 reps - 2 hold  Shoulder External Rotation Reactive Isometrics - 1 x daily - 3 x weekly - 1 sets - 10 reps - 10 hold  Seated Shoulder External Rotation in Abduction Supported with Dumbbell - 1 x daily - 3 x weekly - 3 sets - 10 reps  Scaption with Dumbbells - 1 x daily - 3 x weekly - 3 sets - 10 reps  Standing Wall Ball Circles with Mini Swiss Ball - 1 x daily - 3 x weekly - 3 sets - 30 reps  Standing Bicep Curls Supinated with Dumbbells - 1 x daily - 3 x weekly - 3 sets - 10 reps  Standing Tricep Extensions with Resistance - 1 x daily - 3 x weekly - 3 sets - 10 reps  Standing Bent Over on Chair Single Arm Tricep Extension with Dumbbell - 1 x daily - 3 x weekly - 3 sets - 10 reps  Seated Upper Trapezius Stretch - 1 x daily - 7 x weekly - 3 sets - 30 hold  Seated Levator Scapulae Stretch - 1 x daily - 7 x weekly - 3 sets - 30 hold        Manual Treatments:  PROM / STM / Oscillations-Mobs:  G-I, II, III, IV (PA's, Inf., Post.)  [x] (43901) Provided manual therapy to mobilize soft tissue/joints of cervical/CT, scapular GHJ and UE for the purpose of modulating pain, promoting relaxation,  increasing ROM, reducing/eliminating soft tissue swelling/inflammation/restriction, improving soft tissue extensibility and allowing for proper ROM for normal function with self care, reaching, carrying, lifting, house/yardwork, driving/computer work  Instrument Assisted Soft Tissue Mobilization (IASTM): was applied to the following muscles: upper trap,levator scapulae, with multi-tool. Treatment consisted of IASTM strokes including sweeping, fanning, brushing, strumming, filleting, pinning and framing, based on body region contours, nature of the soft tissue restriction and desired treatment outcomes. These techniques were used to restore neurophysiology, improve mechanotransduction, enhance fluid dynamics and break collagen crosslinks. The treatment area was exposed and the patient was draped in an appropriate manner. Upon completion the clinician cleaned the IASTM tools as per [facility] recommendations. 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 Independent in HEP and progression per patient tolerance, in order to prevent re-injury. [] Progressing: [x] Met: [] Not Met: [] Adjusted   2. Patient will have a decrease in pain to facilitate improvement in movement, function, and ADLs as indicated by Functional Deficits. [] Progressing: [x] Met: [] Not Met: [] Adjusted     Long Term Goals: To be achieved in: 5-6 months  1. Functional index score of 65% or more for the FOTO to assist with reaching prior level of function. [] Progressing: [x] Met: [] Not Met: [] Adjusted  2. Patient will demonstrate increased L shoulder AROM to  155 flex, 170 abd, and ER at 90 to 90 degrees to allow for proper joint functioning as indicated by patients Functional Deficits. [] Progressing: [x] Met: [] Not Met: [] Adjusted  3.  Patient will demonstrate an increase in  L shoulder strength to 4+/5 flex, abd, ER in UE to allow for proper functional mobility as indicated by patients Functional Deficits. 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 Patient will return to ADLs above shoulder height without increased symptoms or restriction. [] Progressing: [x] Met: [] Not Met: [] Adjusted  5. 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 [x] Progressing: [] Met: [x] Not Met: [] Adjusted         Overall Progression Towards Functional goals/ Treatment Progress Update:  [x] Patient is progressing as expected towards functional goals listed. [] Progression is slowed due to complexities/Impairments listed. [] Progression has been slowed due to co-morbidities. [] Plan just implemented, too soon to assess goals progression <30days   [] Goals require adjustment due to lack of progress  [] Patient is not progressing as expected and requires additional follow up with physician  [] Other    Prognosis for POC: [x] Good [] Fair  [] Poor      Patient requires continued skilled intervention: [x] Yes  [] No      ASSESSMENT:    Treatment/Activity Tolerance:  [x] Patient tolerated treatment well [] Patient limited by fatique  [] Patient limited by pain  [] Patient limited by other medical complications  [x] Other: Pt demonstrated moderate amount of clicking with L shoulder there-ex today as noted above. Performed manual therapy to the L upper trap and levator scap to assist with decreasing myofascial tension through muscles to open up subacromial space to reduce the risk of developing tendonitis in the L shoulder. Pt tolerated treatment session well. Updated HEP adding upper trap stretch and levator scapulae stretch.    Return to Play: (if applicable)   []  Stage 1: Intro to Strength   []  Stage 2: Return to Run and Strength   [] Stage 3: Return to Jump and Strength   []  Stage 4: Dynamic Strength and Agility   []  Stage 5: Sport Specific Training   []  Ready to Return to Play, Meets All Above Stages   []  Not Ready for Return to Sports   Comments:            Prognosis: [x] Good [] Fair  [] Poor    Patient Requires Follow-up: [x] Yes  [] No    PLAN: 1-2x/week for 6 weeks (11/1/22); consider ball taps, 90/90 weight carries, IR walkouts to avoid clicking   [x] Continue per plan of care [] Alter current plan (see comments above)  [] Plan of care initiated [] Hold pending MD visit [] Discharge    Note: If patient does not return for scheduled/ recommended follow up visits, this note will serve as a discharge from care along with most recent update on progress.      Electronically signed by: Agusto Perrin, PT, MPT

## 2022-12-13 ENCOUNTER — HOSPITAL ENCOUNTER (OUTPATIENT)
Dept: PHYSICAL THERAPY | Age: 62
Setting detail: THERAPIES SERIES
Discharge: HOME | End: 2022-12-13
Payer: COMMERCIAL

## 2022-12-13 PROCEDURE — 97112 NEUROMUSCULAR REEDUCATION: CPT | Performed by: PHYSICAL THERAPIST

## 2022-12-13 PROCEDURE — 97530 THERAPEUTIC ACTIVITIES: CPT | Performed by: PHYSICAL THERAPIST

## 2022-12-13 PROCEDURE — 97140 MANUAL THERAPY 1/> REGIONS: CPT | Performed by: PHYSICAL THERAPIST

## 2022-12-13 PROCEDURE — 97110 THERAPEUTIC EXERCISES: CPT | Performed by: PHYSICAL THERAPIST

## 2022-12-13 NOTE — FLOWSHEET NOTE
[address], [place] [address] [place] [address] Pedro   Phone: [phone]   Fax: [phone]    Physical Therapy Re-Certification Plan of Care/MD UPDATE      Dear Ruben Hammond,    We had the pleasure of treating the following patient for physical therapy services at [address] [place] [address]. A summary of our findings can be found in the updated assessment below. This includes our plan of care. If you have any questions or concerns regarding these findings, please do not hesitate to contact me at the office phone number checked above. Thank you for the referral.     Physician Signature:________________________________Date:__________________  By signing above (or electronic signature), therapists plan is approved by physician      Total Visits to Date: 23  Overall Response to Treatment:   [x]Patient is responding well to treatment and improvement is noted with regards  to goals   []Patient should continue to improve in reasonable time if they continue HEP   []Patient has plateaued and is no longer responding to skilled PT intervention    []Patient is getting worse and would benefit from return to referring MD   []Patient unable to adhere to initial POC   [x]Other: Pt presents 5 months post RCR. Today pt has signs and symptoms consistent with RTC tendinitis. Pt demonstrates similar ROM values as last visit except for IR which was tight today. Pt demonstrated increased L shoulder strength however was more symptomatic with all MMTs except for IR. Pt had tenderness most notably through the infraspinatus and teres minor tendon as well as supraspinatus tendon and biceps tendon. Educated pt again to only perform HEP strengthening exercises every other day as he has been doing them everyday and is likely the cause of his increase in symptoms.  Also educated pt to perform exercises without resistance and not to progress unless can tolerate the exercise without an increase in symptoms and to use ice to manage discomfort. Pt will follow up with PT in 2 weeks to check in and see how shoulder is feeling. Physical Therapy Daily Treatment Note  Date:  2022    Patient Name:  Pamela Smith    :  1960  MRN: [id number]    Medical/Treatment Diagnosis Information:  Diagnosis: s/p L shoulder rotator cuff repair and bicep tenodesis on 22; S46.012D (ICD-10-CM) - Traumatic tear of left rotator cuff, unspecified tear extent, subsequent encounter  Treatment Diagnosis: M25.512 (L) shoulder pain; M62.81 muscle weakness  Insurance/Certification information:  PT Insurance Information: [place]- 50 visits, no auth, $30 copay  Physician Information:   Dr. Bhaskar Davis  Has the plan of care been signed (Y/N):        [x]  Yes  []  No     Date of Patient follow up with Physician: 23      Is this a Progress Report:     [x]  Yes  []  No        If Yes:  Date Range for reporting period:  Beginning- 22   Ending-22    Progress report will be due (10 Rx or 30 days whichever is less): 3/25/68      Recertification will be due (POC Duration  / 90 days whichever is less): as above        Visit # Insurance Allowable Auth Required   19 50 []  Yes [x]  No        Functional Scale: FOTO- 63    Date assessed:  22      Latex Allergy:  [x]NO      []YES  Preferred Language for Healthcare:   [x]English       []other:      Pain level:  3/10  on 2022    SUBJECTIVE:  Pt says the shoulder is bothering him today and has been a little more since last visit. He notices it hurts to do things like put his jacket on. He has been doing his exercises everyday and his stretching 2x/day but it doesn't seem to be getting better. He has began icing again which has helped but he has not noticed improvement over the past few weeks.  He has been trying to wean off his ibuprofen also in last month but has been taking some still. OBJECTIVE:   Observation:   Test measurements:     CERV ROM       Cervical Flexion       Cervical Extension       Cervical SB       Cervical rotation                     ROM PROM AROM- 12/13/22  Comment     L-  R L- R     Flexion    160 - slight pain @ end range     Abduction    170 - slight pain @ end-range      ER at side    90     ER at 90 abd    93     BB IR      T10 - slight pain      IR at 90 abd     56     Other             Other                    Strength-  L- R-12/13/22 Comment   Flexion  4+ - slight pain anterior shoulder   4+     Abduction  4 + - slight pain superior/posterior shoulder  4     ER  4 - slight pain   4+     IR  5  5     Supraspinatus         Upper Trap         Lower Trap         Mid Trap         Rhomboids         Biceps  5  5     Triceps  5  5     Horizontal Abduction         Horizontal Adduction         Lats            Orthopedic Special Tests: deferred  Special Tests Left Right   Apley Scratch IR:  ER:   Cross body: IR:  ER:  Cross Body:   Neer's       Full Can       Empty Can       Janessa Simeon       Nerve Tension Testing       Speed's       Osuna's        Spurling's       Repeated Scaption                        Reflexes/Sensation (myotomes/dermatomes): n/t     Joint mobility: n/t              []Normal                       []Hypo              []Hyper     Palpation: moderate TTP around infraspinatus/teres minor musculotendinous junciotn, mild TTP through supraspinatus and long head of the biceps ; otherwise non-tender     Functional Mobility/Transfers: Indep     Posture: fair     Bandages/Dressings/Incisions:  pt reports healing well  Gait: (include devices/WB status) Indep    RESTRICTIONS/PRECAUTIONS: HTN, HLD; he also has some back pain also. Carpal tunnel L UE.      Exercises/Interventions:     Exercise/Equipment Resistance/Repetitions Other comments   Aerobic Conditioning     Aerodyne          Stretching/PROM     Er in doorway at 90 abd B 3 x 30  secs both arms    ER supine 1   Cane ER- seated at side     Wand flex     Towel press up     Passive shoulder flex with opposite UE assist     Ball roll on table    Ball on wall for shoulder flex    Elbow PROM    Side-lying flex     Table Slides    Wall slides     UE Baker    Pulleys     Pendulum hold/forward bow     BB IR 10 x 10 secs vertical    SL IR [id number]     Pec doorway stretch     CBA stretch     UT stretch [id number]    LS stretch [id number]     Isometrics     Retraction        Weight shift     Flexion     Abduction    External Rotation    Internal Rotation    Biceps     Triceps          PRE's     scaption 2 x 10 Cues to improve scap ret   Abduction Held 11/29 d/t crepitus in shoulder to avoid tendonitis    External Rotation SL 2 x 10    Internal Rotation     Shrugs    Prone EXT    Reverse Flys- prone Ts 2x10, 2 secs hold- in EROff plinth    Prone Ys     Bent over row    Serratus B   Horizontal Abd with ER     Biceps B   Triceps Retraction     Supination/pronation    Wrist flex/extn     External rotation in abduction (keyshach faith) L only- increased clicking on 36/98 + 13/34 so will hold for now   Cable Column/Theraband     External rotation to upper cut isometric    External Rotation     Internal Rotation    IR walkouts  Holding 11/15 due to increased clicking     Shrugs     Lats     Ext 3 x 10 green TB hold 2 secs    Flex     Scapular Retraction 3 x10 green TB CC    BIC     TRIC    PNF - D2 flex     PNF- D2 extn     [place] flexion/deltoid retraining Plinth incline to 45 deg   Dynamic Stability     Ball on wall    Push ups on wall    Push ups on ball on wall Green SB   90/90 ball taps     Horizontal abd ball taps     Body blade    WBing shoulder taps    90/90 weight carries     120 overhead ball taps (Y position)     Plyoback          Manual treatment:        STM/TrP L Infraspinatus/teres minor 8'    PNF contract/relax for cross body stretch and IR 5'           Patient education: Pt was educated on PT diagnosis, prognosis, and plan of care. Pt was educated on the use of ice throughout the day. Pt educated on doing light scar massage at incisions to decrease tightness. Pt was educated on signs/symptoms of infection and to call with any questions or concerns. Pt educated on post-op dressings, donning/doffing sling, dressing, precautions and restrictions. Therapeutic Exercise and NMR EXR  [x] (38704) Provided verbal/tactile cueing for activities related to strengthening, flexibility, endurance, ROM  for improvements in scapular, scapulothoracic and UE control with self care, reaching, carrying, lifting, house/yardwork, driving/computer work.    [] (96290) Provided verbal/tactile cueing for activities related to improving balance, coordination, kinesthetic sense, posture, motor skill, proprioception  to assist with  scapular, scapulothoracic and UE control with self care, reaching, carrying, lifting, house/yardwork, driving/computer work. Therapeutic Activities:    [x] (12800 or 45480) Provided verbal/tactile cueing for activities related to improving balance, coordination, kinesthetic sense, posture, motor skill, proprioception and motor activation to allow for proper function of scapular, scapulothoracic and UE control with self care, carrying, lifting, driving/computer work. Home Exercise Program:    [x] (06127) Reviewed/Progressed HEP activities related to strengthening, flexibility, endurance, ROM of scapular, scapulothoracic and UE control with self care, reaching, carrying, lifting, house/yardwork, driving/computer work  [] (95779) Reviewed/Progressed HEP activities related to improving balance, coordination, kinesthetic sense, posture, motor skill, proprioception of scapular, scapulothoracic and UE control with self care, reaching, carrying, lifting, house/yardwork, driving/computer work    Access Code: [email or web address]  URL: [email or web address]  Date: 11/29/2022  Prepared by: Jordi Faria White    Exercises  Scaption Wall Slide with Towel - 1 x daily - 7 x weekly - 1 sets - 5 reps - 10 hold  Standing Shoulder Abduction Wall Slide with Thumb Out - 1 x daily - 7 x weekly - 1 sets - 5 reps - 10 hold  Sleeper Stretch - 1 x daily - 7 x weekly - 1 sets - 5 reps - 10 hold  Standing Shoulder Internal Rotation Stretch with Dowel - 1 x daily - 7 x weekly - 1 sets - 5 reps - 10 hold  Single Arm Doorway Pec Stretch at 90 Degrees Abduction - 1 x daily - 7 x weekly - 1 sets - 5 reps - 10 hold  Shoulder extension with resistance - Neutral - 1 x daily - 3 x weekly - 3 sets - 10 reps  Scapular Retraction with Resistance - 1 x daily - 3 x weekly - 3 sets - 10 reps  Shoulder Internal Rotation with Resistance - 1 x daily - 3 x weekly - 3 sets - 10 reps  Sidelying Shoulder External Rotation - 1 x daily - 3 x weekly - 3 sets - 10 reps  Prone Shoulder Horizontal Abduction with External Rotation - 1 x daily - 3 x weekly - 2-3 sets - 10 reps - 2 hold  Shoulder External Rotation Reactive Isometrics - 1 x daily - 3 x weekly - 1 sets - 10 reps - 10 hold  Seated Shoulder External Rotation in Abduction Supported with Dumbbell - 1 x daily - 3 x weekly - 3 sets - 10 reps  Scaption with Dumbbells - 1 x daily - 3 x weekly - 3 sets - 10 reps  Standing Wall Ball Circles with Mini Swiss Ball - 1 x daily - 3 x weekly - 3 sets - 30 reps  Standing Bicep Curls Supinated with Dumbbells - 1 x daily - 3 x weekly - 3 sets - 10 reps  Standing Tricep Extensions with Resistance - 1 x daily - 3 x weekly - 3 sets - 10 reps  Standing Bent Over on Chair Single Arm Tricep Extension with Dumbbell - 1 x daily - 3 x weekly - 3 sets - 10 reps  Seated Upper Trapezius Stretch - 1 x daily - 7 x weekly - 3 sets - 30 hold  Seated Levator Scapulae Stretch - 1 x daily - 7 x weekly - 3 sets - 30 hold        Manual Treatments:  PROM / STM / Oscillations-Mobs:  G-I, II, III, IV (PA's, Inf., Post.)  [x] (63734) Provided manual therapy to mobilize soft tissue/joints of cervical/CT, scapular GHJ and UE for the purpose of modulating pain, promoting relaxation,  increasing ROM, reducing/eliminating soft tissue swelling/inflammation/restriction, improving soft tissue extensibility and allowing for proper ROM for normal function with self care, reaching, carrying, lifting, house/yardwork, driving/computer work      Modalities: ice - 10min  Charges:  Timed Code Treatment Minutes: 55   Total Treatment Minutes: 65     [] EVAL (LOW) 99000   [] EVAL (MOD) 31921   [] EVAL (HIGH) 34536   [] RE-EVAL   [x] YA(80230) x  1  [] IONTO  [x] NMR (33334) x  1   [] VASO  [x] Manual (75060) x    1  [] Other:  [x] TA x 1   [] Mech Traction (67988)  [] ES(attended) (55036)      [] ES (un) (47399):       [place] 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 Independent in HEP and progression per patient tolerance, in order to prevent re-injury. [] Progressing: [x] Met: [] Not Met: [] Adjusted   2. Patient will have a decrease in pain to facilitate improvement in movement, function, and ADLs as indicated by Functional Deficits. [] Progressing: [x] Met: [] Not Met: [] Adjusted     Long Term Goals: To be achieved in: 5-6 months  1. Functional index score of 65% or more for the FOTO to assist with reaching prior level of function. [] Progressing: [x] Met: [] Not Met: [] Adjusted  2. Patient will demonstrate increased L shoulder AROM to  155 flex, 170 abd, and ER at 90 to 90 degrees to allow for proper joint functioning as indicated by patients Functional Deficits. [] Progressing: [x] Met: [] Not Met: [] Adjusted  3. Patient will demonstrate an increase in  L shoulder strength to 4+/5 flex, abd, ER in UE to allow for proper functional mobility as indicated by patients Functional Deficits. 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  Patient will return to ADLs above shoulder height without increased symptoms or restriction. [] Progressing: [x] Met: [] Not Met: [] Adjusted  5. 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 [x] Progressing: [] Met: [x] Not Met: [] Adjusted         Overall Progression Towards Functional goals/ Treatment Progress Update:  [x] Patient is progressing as expected towards functional goals listed. [] Progression is slowed due to complexities/Impairments listed. [] Progression has been slowed due to co-morbidities. [] Plan just implemented, too soon to assess goals progression <30days   [] Goals require adjustment due to lack of progress  [] Patient is not progressing as expected and requires additional follow up with physician  [] Other    Prognosis for POC: [x] Good [] Fair  [] Poor      Patient requires continued skilled intervention: [x] Yes  [] No      ASSESSMENT:    Treatment/Activity Tolerance:  [x] Patient tolerated treatment well [] Patient limited by fatique  [] Patient limited by pain  [] Patient limited by other medical complications  [x] Other: t presents 5 months post RCR. Today pt has signs and symptoms consistent with RTC tendinitis. Pt demonstrates similar ROM values as last visit except for IR which was tight today. Pt demonstrated increased L shoulder strength however was more symptomatic with all MMTs except for IR. Pt had tenderness most notably through the infraspinatus and teres minor tendon as well as supraspinatus tendon and biceps tendon. Educated pt again to only perform HEP strengthening exercises every other day as he has been doing them everyday and is likely the cause of his increase in symptoms. Also educated pt to perform exercises without resistance and not to progress unless can tolerate the exercise without an increase in symptoms and to use ice to manage discomfort.  Pt will follow up with PT in 2 weeks to check in and see how shoulder is feeling. Pt did have good response to manual therapy today feeling less symptomatic while reaching back and had slight improvements in IR ROM. Tolerated SL ER with slight discomfort which pt was educated is expected due to tendinitis. Performed exercises in HEP to correct form to make sure pt is doing them correctly at home. Pt will follow up with PT in 2 weeks to check in and see how shoulder is feeling. Return to Play: (if applicable)   []  Stage 1: Intro to Strength   []  Stage 2: Return to Run and Strength   []  Stage 3: Return to Jump and Strength   []  Stage 4: Dynamic Strength and Agility   []  Stage 5: Sport Specific Training   []  Ready to Return to Play, Meets All Above Stages   []  Not Ready for Return to Sports   Comments:            Prognosis: [x] Good [] Fair  [] Poor    Patient Requires Follow-up: [x] Yes  [] No    PLAN: 1x/week to every other week for 6 weeks (12/13/22); check on symptoms and continue with manual as needed; consider ball taps, 90/90 weight carries, IR walkouts to avoid clicking   [x] Continue per plan of care [] Alter current plan (see comments above)  [] Plan of care initiated [] Hold pending MD visit [] Discharge    Note: If patient does not return for scheduled/ recommended follow up visits, this note will serve as a discharge from care along with most recent update on progress. Electronically signed by: Therapist was present, directed the patient's care, made skilled judgement, and was responsible for assessment and treatment of the patient.  MARINA Dickerson[place], [place] [id number]

## 2022-12-30 ENCOUNTER — HOSPITAL ENCOUNTER (OUTPATIENT)
Dept: PHYSICAL THERAPY | Age: 62
Setting detail: THERAPIES SERIES
Discharge: HOME | End: 2022-12-30
Payer: COMMERCIAL

## 2022-12-30 PROCEDURE — 97112 NEUROMUSCULAR REEDUCATION: CPT | Performed by: PHYSICAL THERAPIST

## 2022-12-30 PROCEDURE — 97110 THERAPEUTIC EXERCISES: CPT | Performed by: PHYSICAL THERAPIST

## 2022-12-30 NOTE — FLOWSHEET NOTE
[address] [facility],[address], [place] [address] [address] [facility], [place], [address] [facility]  Phone: [phone]   Fax: [phone]                                                Physical Therapy Daily Treatment Note  Date:  2022    Patient Name:  Lolis Felder    :  1960  MRN: [id number]    Medical/Treatment Diagnosis Information:  Diagnosis: s/p L shoulder rotator cuff repair and bicep tenodesis on 22; S46.012D (ICD-10-CM) - Traumatic tear of left rotator cuff, unspecified tear extent, subsequent encounter  Treatment Diagnosis: M25.512 (L) shoulder pain; M62.81 muscle weakness  Insurance/Certification information:  PT Insurance Information: [place]- 50 visits, no auth, $30 copay  Physician Information:   Dr. Lilly Georges  Has the plan of care been signed (Y/N):        [x]  Yes  []  No     Date of Patient follow up with Physician: 23      Is this a Progress Report:     []  Yes  [x]  No        If Yes:  Date Range for reporting period:  Beginning- 22   Ending-22    Progress report will be due (10 Rx or 30 days whichever is less):       Recertification will be due (POC Duration  / 90 days whichever is less): as above        Visit # Insurance Allowable Auth Required   20 50 []  Yes [x]  No        Functional Scale: FOTO- 63    Date assessed:  22      Latex Allergy:  [x]NO      []YES  Preferred Language for Healthcare:   [x]English       []other:      Pain level:  0/10  on 2022    SUBJECTIVE:  Pt is feeling much better compared to last week. He did back down on exercises but has since been able to return to 2-3# on SL ER, continued with TB extn and scap ret and perform push ups on ball without a problem.  He feels like the neck stretches are helping him and has noticed more mobility    OBJECTIVE:   Observation:   Test measurements:     CERV ROM       Cervical Flexion       Cervical Extension Cervical SB       Cervical rotation                     ROM PROM AROM- 12/13/22  Comment     L-  R L- R     Flexion    160 - slight pain @ end range     Abduction    170 - slight pain @ end-range      ER at side    90     ER at 90 abd    93     BB IR      T10 - slight pain      IR at 90 abd     56     Other             Other                    Strength-  L- R-12/13/22 Comment   Flexion  4+ - slight pain anterior shoulder   4+     Abduction  4 + - slight pain superior/posterior shoulder  4     ER  4 - slight pain   4+     IR  5  5     Supraspinatus         Upper Trap         Lower Trap         Mid Trap         Rhomboids         Biceps  5  5     Triceps  5  5     Horizontal Abduction         Horizontal Adduction         Lats            Orthopedic Special Tests: deferred  Special Tests Left Right   Apley Scratch IR:  ER:   Cross body: IR:  ER:  Cross Body:   Neer's       Full Can       Empty Can       Annabel Jonesy       Nerve Tension Testing       Speed's       Osuna's        Spurling's       Repeated Scaption                        Reflexes/Sensation (myotomes/dermatomes): n/t     Joint mobility: n/t              []Normal                       []Hypo              []Hyper     Palpation: moderate TTP around infraspinatus/teres minor musculotendinous junciotn, mild TTP through supraspinatus and long head of the biceps ; otherwise non-tender     Functional Mobility/Transfers: Indep     Posture: fair     Bandages/Dressings/Incisions:  pt reports healing well  Gait: (include devices/WB status) Indep    RESTRICTIONS/PRECAUTIONS: HTN, HLD; he also has some back pain also. Carpal tunnel L UE.      Exercises/Interventions:     Exercise/Equipment Resistance/Repetitions Other comments   Aerobic Conditioning     Aerodyne          Stretching/PROM     Er in doorway at 90 abd B 3 x 30  secs both arms    ER supine 1   Cane ER- seated at side     Wand flex     Towel press up     Passive shoulder flex with opposite UE assist Ball roll on [employer] on wall for shoulder flex    Elbow PROM    Side-lying flex     Table Slides    Wall slides     UE [place]    Pulleys     Pendulum hold/forward bow     BB IR 10 x 10 secs vertical    SL IR [id number]     Pec doorway stretch     CBA stretch     UT stretch    LS stretch    Isometrics     Retraction        Weight shift     Flexion     Abduction    External Rotation    Internal Rotation    Biceps     Triceps          PRE's     scaption 2 x 10 3 lbs Cues to improve scap ret   Abduction Held 11/29 d/t crepitus in shoulder to avoid tendonitis    External Rotation SL 3 x 10 2 lbs    Internal Rotation     Shrugs    Prone EXT    Reverse Flys- prone Ts 3x10, 2 secs hold- in ER 0, 1, 2 lbs Off plinth    Prone Ys     Bent over row    Serratus B   Horizontal Abd with ER     Biceps B   Triceps Retraction     Supination/pronation    Wrist flex/extn     External rotation in abduction (keyshach hiker) L only- increased clicking on 51/94 + 80/97 so will hold for now   Cable Column/Theraband     External rotation to upper cut isometric 10 x 5 secs yellow TB    External Rotation     Internal Rotation    IR walkouts 10 x 10 secs blue TB   Holding 11/15 due to increased clicking       Shrugs     Lats     Ext s    Flex     Scapular Retraction 3 x10 blue TB high row CC    BIC     TRIC    PNF - D2 flex     PNF- D2 extn 2 x 10 green TB    UK flexion/deltoid retraining Plinth incline to 45 deg   Dynamic Stability     Ball on wall 3 x 30 each CW/CCW, 2 lb    Push ups on wall    Push ups on ball on wall Green SB   90/90 ball taps     Horizontal abd ball taps     Body blade    WBing shoulder taps    90/90 weight carries     120 overhead ball taps (Y position)     Plyoback          Manual treatment:        STM/TrP L Infraspinatus/teres minor    PNF contract/relax for cross body stretch and IR           Patient education: Pt was educated on PT diagnosis, prognosis, and plan of care.  Pt was educated on the use of ice throughout the day. Pt educated on doing light scar massage at incisions to decrease tightness. Pt was educated on signs/symptoms of infection and to call with any questions or concerns. Pt educated on post-op dressings, donning/doffing sling, dressing, precautions and restrictions. Therapeutic Exercise and NMR EXR  [x] (70452) Provided verbal/tactile cueing for activities related to strengthening, flexibility, endurance, ROM  for improvements in scapular, scapulothoracic and UE control with self care, reaching, carrying, lifting, house/yardwork, driving/computer work.    [] (97209) Provided verbal/tactile cueing for activities related to improving balance, coordination, kinesthetic sense, posture, motor skill, proprioception  to assist with  scapular, scapulothoracic and UE control with self care, reaching, carrying, lifting, house/yardwork, driving/computer work. Therapeutic Activities:    [x] (50657 or 39975) Provided verbal/tactile cueing for activities related to improving balance, coordination, kinesthetic sense, posture, motor skill, proprioception and motor activation to allow for proper function of scapular, scapulothoracic and UE control with self care, carrying, lifting, driving/computer work. Home Exercise Program:    [x] (85395) Reviewed/Progressed HEP activities related to strengthening, flexibility, endurance, ROM of scapular, scapulothoracic and UE control with self care, reaching, carrying, lifting, house/yardwork, driving/computer work  [] (44153) Reviewed/Progressed HEP activities related to improving balance, coordination, kinesthetic sense, posture, motor skill, proprioception of scapular, scapulothoracic and UE control with self care, reaching, carrying, lifting, house/yardwork, driving/computer work    Access Code: [email or web address]  URL: [email or web address]  Date: 11/29/2022  Prepared by: Theresa Collier    Exercises  Scaption Wall Slide with Towel - 1 x daily - 7 x weekly - 1 sets - 5 reps - 10 hold  Standing Shoulder Abduction Wall Slide with Thumb Out - 1 x daily - 7 x weekly - 1 sets - 5 reps - 10 hold  Sleeper Stretch - 1 x daily - 7 x weekly - 1 sets - 5 reps - 10 hold  Standing Shoulder Internal Rotation Stretch with Dowel - 1 x daily - 7 x weekly - 1 sets - 5 reps - 10 hold  Single Arm Doorway Pec Stretch at 90 Degrees Abduction - 1 x daily - 7 x weekly - 1 sets - 5 reps - 10 hold  Shoulder extension with resistance - Neutral - 1 x daily - 3 x weekly - 3 sets - 10 reps  Scapular Retraction with Resistance - 1 x daily - 3 x weekly - 3 sets - 10 reps  Shoulder Internal Rotation with Resistance - 1 x daily - 3 x weekly - 3 sets - 10 reps  Sidelying Shoulder External Rotation - 1 x daily - 3 x weekly - 3 sets - 10 reps  Prone Shoulder Horizontal Abduction with External Rotation - 1 x daily - 3 x weekly - 2-3 sets - 10 reps - 2 hold  Shoulder External Rotation Reactive Isometrics - 1 x daily - 3 x weekly - 1 sets - 10 reps - 10 hold  Seated Shoulder External Rotation in Abduction Supported with Dumbbell - 1 x daily - 3 x weekly - 3 sets - 10 reps  Scaption with Dumbbells - 1 x daily - 3 x weekly - 3 sets - 10 reps  Standing Wall Ball Circles with Mini Swiss Ball - 1 x daily - 3 x weekly - 3 sets - 30 reps  Standing Bicep Curls Supinated with Dumbbells - 1 x daily - 3 x weekly - 3 sets - 10 reps  Standing Tricep Extensions with Resistance - 1 x daily - 3 x weekly - 3 sets - 10 reps  Standing Bent Over on Chair Single Arm Tricep Extension with Dumbbell - 1 x daily - 3 x weekly - 3 sets - 10 reps  Seated Upper Trapezius Stretch - 1 x daily - 7 x weekly - 3 sets - 30 hold  Seated Levator Scapulae Stretch - 1 x daily - 7 x weekly - 3 sets - 30 hold        Manual Treatments:  PROM / STM / Oscillations-Mobs:  G-I, II, III, IV (PA's, Inf., Post.)  [x] (33582) Provided manual therapy to mobilize soft tissue/joints of cervical/CT, scapular GHJ and UE for the purpose of modulating pain, promoting relaxation,  increasing ROM, reducing/eliminating soft tissue swelling/inflammation/restriction, improving soft tissue extensibility and allowing for proper ROM for normal function with self care, reaching, carrying, lifting, house/yardwork, driving/computer work      Modalities: Charges:  Timed Code Treatment Minutes: 40   Total Treatment Minutes: 40     [] EVAL (LOW) 17307   [] EVAL (MOD) 00138   [] EVAL (HIGH) 40000   [] RE-EVAL   [x] LY(45616) x  2  [] IONTO  [x] NMR (06561) x  1   [] VASO  [] Manual (30466) x      [] Other:  [] TA x    [] Mech Traction (55134)  [] ES(attended) (97337)      [] ES (un) (01774):       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 Independent in HEP and progression per patient tolerance, in order to prevent re-injury. [] Progressing: [x] Met: [] Not Met: [] Adjusted   2. Patient will have a decrease in pain to facilitate improvement in movement, function, and ADLs as indicated by Functional Deficits. [] Progressing: [x] Met: [] Not Met: [] Adjusted     Long Term Goals: To be achieved in: 5-6 months  1. Functional index score of 65% or more for the FOTO to assist with reaching prior level of function. [] Progressing: [x] Met: [] Not Met: [] Adjusted  2. Patient will demonstrate increased L shoulder AROM to  155 flex, 170 abd, and ER at 90 to 90 degrees to allow for proper joint functioning as indicated by patients Functional Deficits. [] Progressing: [x] Met: [] Not Met: [] Adjusted  3. Patient will demonstrate an increase in  L shoulder strength to 4+/5 flex, abd, ER in UE to allow for proper functional mobility as indicated by patients Functional Deficits. 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 Patient will return to ADLs above shoulder height without increased symptoms or restriction.    [] Progressing: [x] Met: [] Not Met: [] Adjusted  5. 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 [x] Progressing: [] Met: [x] Not Met: [] Adjusted         Overall Progression Towards Functional goals/ Treatment Progress Update:  [x] Patient is progressing as expected towards functional goals listed. [] Progression is slowed due to complexities/Impairments listed. [] Progression has been slowed due to co-morbidities. [] Plan just implemented, too soon to assess goals progression <30days   [] Goals require adjustment due to lack of progress  [] Patient is not progressing as expected and requires additional follow up with physician  [] Other    Prognosis for POC: [x] Good [] Fair  [] Poor      Patient requires continued skilled intervention: [x] Yes  [] No      ASSESSMENT:    Treatment/Activity Tolerance:  [x] Patient tolerated treatment well [] Patient limited by fatique  [] Patient limited by pain  [] Patient limited by other medical complications  [x] Other: pt is feeling much better after decreasing frequency of strengthening at home and decreasing resistance for about a week. Pt was able to return to more strengthening today. Educated pt may also return to more strengthening/adding back in resistance at home as tolerates but to make sure not doing strengthening more than every other day. Pt will follow up again before MD appt in 1.5 weeks.      Return to Play: (if applicable)   []  Stage 1: Intro to Strength   []  Stage 2: Return to Run and Strength   []  Stage 3: Return to Jump and Strength   []  Stage 4: Dynamic Strength and Agility   []  Stage 5: Sport Specific Training   []  Ready to Return to Play, Meets All Above Stages   []  Not Ready for Return to Sports   Comments:            Prognosis: [x] Good [] Fair  [] Poor    Patient Requires Follow-up: [x] Yes  [] No    PLAN: 1x/week to every other week for 6 weeks (12/13/22); progress note and consider d/c to HEP as indicated. [x] Continue per plan of care [] Alter current plan (see comments above)  [] Plan of care initiated [] Hold pending MD visit [] Discharge    Note: If patient does not return for scheduled/ recommended follow up visits, this note will serve as a discharge from care along with most recent update on progress.      Electronically signed by:     Ming Jackson, PT, DPT [id number]

## 2023-01-09 ENCOUNTER — OFFICE VISIT (OUTPATIENT)
Dept: ORTHOPEDIC SURGERY | Age: 63
End: 2023-01-09
Payer: COMMERCIAL

## 2023-01-09 ENCOUNTER — HOSPITAL ENCOUNTER (OUTPATIENT)
Dept: PHYSICAL THERAPY | Age: 63
Setting detail: THERAPIES SERIES
Discharge: HOME | End: 2023-01-09
Payer: COMMERCIAL

## 2023-01-09 VITALS — RESPIRATION RATE: 12 BRPM | WEIGHT: 192 LBS | HEIGHT: 70 IN | BODY MASS INDEX: 27.49 KG/M2

## 2023-01-09 DIAGNOSIS — S46.012D TRAUMATIC TEAR OF LEFT ROTATOR CUFF, UNSPECIFIED TEAR EXTENT, SUBSEQUENT ENCOUNTER: Primary | ICD-10-CM

## 2023-01-09 PROCEDURE — 97110 THERAPEUTIC EXERCISES: CPT | Performed by: PHYSICAL THERAPIST

## 2023-01-09 PROCEDURE — 99212 OFFICE O/P EST SF 10 MIN: CPT | Performed by: ORTHOPAEDIC SURGERY

## 2023-01-09 PROCEDURE — 97112 NEUROMUSCULAR REEDUCATION: CPT | Performed by: PHYSICAL THERAPIST

## 2023-01-09 NOTE — PROGRESS NOTES
James Morrison returns today status post left shoulder arthroscopy with rotator cuff repair performed July 13. He is doing great and reports that his shoulder never bothers him. He is very pleased. General Exam:    Vitals: Resp. rate 12, height 5' 10\" (1.778 m), weight 192 lb (87.1 kg). Constitutional: Patient is adequately groomed with no evidence of malnutrition  Mental Status: The patient is oriented to time, place and person. The patient's mood and affect are appropriate. Today, left shoulder demonstrates no tenderness. Incisions are nicely healed. He has symmetric motion and equal strength bilaterally. I cannot reproduce discomfort. I encouraged him to continue with his home exercise program.  Follow-up with me on an as-needed basis.

## 2023-01-09 NOTE — PLAN OF CARE
Charles 79, [address] [place] [address], [address] [place] [address]  Phone: [phone]   Fax: [phone]    Physical Therapy Discharge Note      Dear  Dr. Verito Alccoer,    We had the pleasure of treating the following patient for physical therapy services at [address] [place] [address]. A summary of our findings can be found in the updated assessment below. This includes our plan of care. If you have any questions or concerns regarding these findings, please do not hesitate to contact me at the office phone number checked above. Thank you for the referral.     Physician Signature:________________________________Date:__________________  By signing above (or electronic signature), therapists plan is approved by physician    Total Visits to Date: 21  Overall Response to Treatment:   [x]Patient is responding well to treatment and improvement is noted with regards  to goals   []Patient should continue to improve in reasonable time if they continue HEP   []Patient has plateaued and is no longer responding to skilled PT intervention    []Patient is getting worse and would benefit from return to referring MD   []Patient unable to adhere to initial POC   [x]Other: pt has done very well post op RCR. He shows good shoulder AROM with mild limit in IR motions still. He continues to improve in shoulder strength with mild limit in resisted abd still. Pt has been able to return to strengthening and at home and in clinic again without adverse affects. Pt reports his shoulder is feeling great and no longer pain with ADLs or dressing. Pt will be d/c to HEP. Pt educated on importance of continuing with HEP and avoiding over doing it for the shoulder. He was given an updated HEP.                                                Physical Therapy Daily Treatment Note  Date:  2023    Patient Name:  Cristina Azevedo    : 1960  MRN: [id number]    Medical/Treatment Diagnosis Information:  Diagnosis: s/p L shoulder rotator cuff repair and bicep tenodesis on 7/13/22; S46.012D (ICD-10-CM) - Traumatic tear of left rotator cuff, unspecified tear extent, subsequent encounter  Treatment Diagnosis: M25.512 (L) shoulder pain; M62.81 muscle weakness  Insurance/Certification information:  PT Insurance Information: [place]- 50 visits, no auth, $30 copay  Physician Information:   Dr. Peggy Martin  Has the plan of care been signed (Y/N):        [x]  Yes  []  No     Date of Patient follow up with Physician: 1/9/23      Is this a Progress Report:     [x]  Yes  []  No        If Yes:  Date Range for reporting period:  Beginning- 11/1/22   Ending-1/9/23    Progress report will be due (10 Rx or 30 days whichever is less): 1/8/24      Recertification will be due (POC Duration  / 90 days whichever is less): as above        Visit # Insurance Allowable Auth Required   21 50 []  Yes [x]  No        Functional Scale: FOTO- 71    Date assessed:  1/9/23      Latex Allergy:  [x]NO      []YES  Preferred Language for Healthcare:   [x]English       []other:      Pain level:  0/10  on 1/9/2023    SUBJECTIVE:  Pt says he is feeling good. He was not too sore after last session. He has been able to return to more strengthening and return to increasing weight without restrictions. He saw MD today and was told he may continue with HEP on his own.      OBJECTIVE:   Observation:   Test measurements:     CERV ROM       Cervical Flexion       Cervical Extension       Cervical SB       Cervical rotation                     ROM PROM AROM-   Comment     L-  R L-1/9/23 R     Flexion    162      Abduction    182     ER at side    90     ER at 90 abd    94     BB IR      T10     IR at 90 abd     56     Other             Other                    Strength-  L-1/9/23 R- Comment   Flexion  4+   4+     Abduction  4   slight pain superior/posterior shoulder  4     ER  4+   4+     IR  5  5 Supraspinatus         Upper Trap         Lower Trap         Mid Trap         Rhomboids         Biceps  5  5     Triceps  5  5     Horizontal Abduction         Horizontal Adduction         Lats            Orthopedic Special Tests: deferred  Special Tests Left Right   Apley Scratch IR:  ER:   Cross body: IR:  ER:  Cross Body:   Neer's       Full Can       Empty Can       Chuck Cheese       Nerve Tension Testing       Speed's       Osuna's        Spurling's       Repeated Scaption                        Reflexes/Sensation (myotomes/dermatomes): n/t     Joint mobility: n/t              []Normal                       []Hypo              []Hyper     Palpation: n/t     Functional Mobility/Transfers: Indep     Posture: fair     Bandages/Dressings/Incisions:  pt reports healing well  Gait: (include devices/WB status) Indep    RESTRICTIONS/PRECAUTIONS: HTN, HLD; he also has some back pain also. Carpal tunnel L UE.      Exercises/Interventions:     Exercise/Equipment Resistance/Repetitions Other comments   Aerobic Conditioning     Aerodyne          Stretching/PROM     Er in doorway at 90 abd B    ER supine 1   Cane ER- seated at side     Wand flex     Towel press up     Passive shoulder flex with opposite UE assist     Ball roll on table    Ball on wall for shoulder flex    Elbow PROM    Side-lying flex     Table Slides    Wall slides     UE [place]    Pulleys     Pendulum hold/forward bow     BB IR 5 x 10 secs vertical    SL IR [id number]     Pec doorway stretch     CBA stretch     UT stretch    LS stretch    Isometrics     Retraction        Weight shift     Flexion     Abduction    External Rotation    Internal Rotation    Biceps     Triceps          PRE's     scaption 3 x 10 3 lbs Cues to improve scap ret   Abduction Held 11/29 d/t crepitus in shoulder to avoid tendonitis    External Rotation    Internal Rotation     Shrugs    Prone EXT    Reverse Flys- prone Ts Off plinth    Prone Ys     Bent over row    Serratus B Horizontal Abd with band 2 x 10     Biceps B   Triceps Retraction     Supination/pronation    Wrist flex/extn     External rotation in abduction (hitch hiker) L only- increased clicking on 68/15 + 02/51 so will hold for now   Cable Column/Theraband     External rotation to upper cut isometric 5 x 10 secs green TB    External Rotation 3 x 10 green TB    Internal Rotation    IR walkouts 5 x 10 secs blue TB   Holding 11/15 due to increased clicking       Shrugs     Lats     Ext s    Flex     Scapular Retraction 3 x10 CC L7     BIC     TRIC    PNF - D2 flex     PNF- D2 extn 2 x 10 black TB    UK flexion/deltoid retraining Plinth incline to 45 deg   Dynamic Stability     Ball on wall 3 x 30 each CW/CCW, 2 lb    Push ups on wall    Push ups on ball on wall 10 x 5-10 secs Green SB   90/90 ball taps     Horizontal abd ball taps     Body blade    WBing shoulder taps    90/90 weight carries     120 overhead ball taps (Y position)     Plyoback          Manual treatment:        STM/TrP L Infraspinatus/teres minor    PNF contract/relax for cross body stretch and IR           Patient education: Pt was educated on PT diagnosis, prognosis, and plan of care. Pt was educated on the use of ice throughout the day. Pt educated on doing light scar massage at incisions to decrease tightness. Pt was educated on signs/symptoms of infection and to call with any questions or concerns. Pt educated on post-op dressings, donning/doffing sling, dressing, precautions and restrictions.      Therapeutic Exercise and NMR EXR  [x] (45888) Provided verbal/tactile cueing for activities related to strengthening, flexibility, endurance, ROM  for improvements in scapular, scapulothoracic and UE control with self care, reaching, carrying, lifting, house/yardwork, driving/computer work.    [] (49460) Provided verbal/tactile cueing for activities related to improving balance, coordination, kinesthetic sense, posture, motor skill, proprioception  to assist with  scapular, scapulothoracic and UE control with self care, reaching, carrying, lifting, house/yardwork, driving/computer work. Therapeutic Activities:    [x] (21222 or 09355) Provided verbal/tactile cueing for activities related to improving balance, coordination, kinesthetic sense, posture, motor skill, proprioception and motor activation to allow for proper function of scapular, scapulothoracic and UE control with self care, carrying, lifting, driving/computer work. Home Exercise Program:    [x] (53714) Reviewed/Progressed HEP activities related to strengthening, flexibility, endurance, ROM of scapular, scapulothoracic and UE control with self care, reaching, carrying, lifting, house/yardwork, driving/computer work  [] (88521) Reviewed/Progressed HEP activities related to improving balance, coordination, kinesthetic sense, posture, motor skill, proprioception of scapular, scapulothoracic and UE control with self care, reaching, carrying, lifting, house/yardwork, driving/computer work    Access Code: [email or web address]  URL: [email or web address]  Date: 01/09/2023  Prepared by: Phuc Branch    Exercises  Doorway Pec Stretch at 90 Degrees Abduction - 1 x daily - 3 x weekly - 3 sets - 1 reps - 30 hold  Scaption Wall Slide with Towel - 1 x daily - 3 x weekly - 1 sets - 5 reps - 10 hold  Standing Shoulder Abduction Wall Slide with Thumb Out - 1 x daily - 3 x weekly - 1 sets - 5 reps - 10 hold  Sleeper Stretch - 1 x daily - 3 x weekly - 1 sets - 5 reps - 10 hold  Standing Shoulder Internal Rotation Stretch with Dowel - 1 x daily - 3 x weekly - 1 sets - 5 reps - 10 hold  Shoulder extension with resistance - Neutral - 1 x daily - 3 x weekly - 3 sets - 10 reps  Scapular Retraction with Resistance - 1 x daily - 3 x weekly - 3 sets - 10 reps  Shoulder Internal Rotation Reactive Isometrics - 1 x daily - 7 x weekly - 1 sets - 10 reps - 10 hold  Shoulder External Rotation with Anchored Resistance - 1 x daily - 7 x weekly - 3 sets - 10 reps  Sidelying Shoulder External Rotation - 1 x daily - 3 x weekly - 3 sets - 10 reps  Prone Shoulder Horizontal Abduction with External Rotation - 1 x daily - 3 x weekly - 2-3 sets - 10 reps - 2 hold  Standing Shoulder Horizontal Abduction with Resistance - 1 x daily - 7 x weekly - 3 sets - 10 reps  Shoulder External Rotation Reactive Isometrics - 1 x daily - 3 x weekly - 1 sets - 10 reps - 10 hold  Standing Shoulder Single Arm PNF D2 Extension with Anchored Resistance - 1 x daily - 7 x weekly - 3 sets - 10 reps  Scaption with Dumbbells - 1 x daily - 3 x weekly - 3 sets - 10 reps  Standing Wall Ball Circles with Mini Swiss Ball - 1 x daily - 3 x weekly - 3 sets - 30 reps  Seated Levator Scapulae Stretch - 1 x daily - 7 x weekly - 3 sets - 30 hold  Seated Cervical Sidebending Stretch - 2 x daily - 7 x weekly - 1 sets - 3 reps - 30 hold        Manual Treatments:  PROM / STM / Oscillations-Mobs:  G-I, II, III, IV (PA's, Inf., Post.)  [x] (63664) Provided manual therapy to mobilize soft tissue/joints of cervical/CT, scapular GHJ and UE for the purpose of modulating pain, promoting relaxation,  increasing ROM, reducing/eliminating soft tissue swelling/inflammation/restriction, improving soft tissue extensibility and allowing for proper ROM for normal function with self care, reaching, carrying, lifting, house/yardwork, driving/computer work      Modalities: Charges:  Timed Code Treatment Minutes: 43   Total Treatment Minutes: 44     [] EVAL (LOW) 87624   [] EVAL (MOD) 32474   [] EVAL (HIGH) 23583   [] RE-EVAL   [x] IV(79598) x  2  [] IONTO  [x] NMR (71239) x  1   [] VASO  [] Manual (42215) x      [] Other:  [] TA x    [] Mech Traction (21182)  [] ES(attended) (10404)      [] ES (un) (94056):       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  Independent in HEP and progression per patient tolerance, in order to prevent re-injury. [] Progressing: [x] Met: [] Not Met: [] Adjusted   2. Patient will have a decrease in pain to facilitate improvement in movement, function, and ADLs as indicated by Functional Deficits. [] Progressing: [x] Met: [] Not Met: [] Adjusted     Long Term Goals: To be achieved in: 5-6 months  1. Functional index score of 65% or more for the FOTO to assist with reaching prior level of function. [] Progressing: [x] Met: [] Not Met: [] Adjusted  2. Patient will demonstrate increased L shoulder AROM to  155 flex, 170 abd, and ER at 90 to 90 degrees to allow for proper joint functioning as indicated by patients Functional Deficits. [] Progressing: [x] Met: [] Not Met: [] Adjusted  3. Patient will demonstrate an increase in  L shoulder strength to 4+/5 flex, abd, ER in UE to allow for proper functional mobility as indicated by patients Functional Deficits. 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 Patient will return to ADLs above shoulder height without increased symptoms or restriction. [] Progressing: [x] Met: [] Not Met: [] Adjusted  5. 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 [x] Progressing: [] Met: [x] Not Met: not assessed yet [] Adjusted         Overall Progression Towards Functional goals/ Treatment Progress Update:  [x] Patient is progressing as expected towards functional goals listed. [] Progression is slowed due to complexities/Impairments listed. [] Progression has been slowed due to co-morbidities.   [] Plan just implemented, too soon to assess goals progression <30days   [] Goals require adjustment due to lack of progress  [] Patient is not progressing as expected and requires additional follow up with physician  [] Other    Prognosis for POC: [x] Good [] Fair  [] Poor      Patient requires continued skilled intervention: [] Yes  [x] No      ASSESSMENT:    Treatment/Activity Tolerance:  [x] Patient tolerated treatment well [] Patient limited by fatique  [] Patient limited by pain  [] Patient limited by other medical complications  [x] Other: pt has done very well post op RCR. He shows good shoulder AROM with mild limit in IR motions still. He continues to improve in shoulder strength with mild limit in resisted abd still. Pt has been able to return to strengthening and at home and in clinic again without adverse affects. Pt reports his shoulder is feeling great and no longer pain with ADLs or dressing. Pt will be d/c to HEP. Pt educated on importance of continuing with HEP and avoiding over doing it for the shoulder. He was given an updated HEP. Return to Play: (if applicable)   []  Stage 1: Intro to Strength   []  Stage 2: Return to Run and Strength   []  Stage 3: Return to Jump and Strength   []  Stage 4: Dynamic Strength and Agility   []  Stage 5: Sport Specific Training   []  Ready to Return to Play, Meets All Above Stages   []  Not Ready for Return to Sports   Comments:            Prognosis: [x] Good [] Fair  [] Poor    Patient Requires Follow-up: [] Yes  [x] No    PLAN:   [] Continue per plan of care [] Alter current plan (see comments above)  [] Plan of care initiated [] Hold pending MD visit [x] Discharge    Note: If patient does not return for scheduled/ recommended follow up visits, this note will serve as a discharge from care along with most recent update on progress.      Electronically signed by:     Vivek Palacios, PT, DPT [id number]

## 2025-03-18 ENCOUNTER — HOSPITAL ENCOUNTER (OUTPATIENT)
Dept: GENERAL RADIOLOGY | Age: 65
Discharge: HOME | End: 2025-03-18
Payer: COMMERCIAL

## 2025-03-18 ENCOUNTER — HOSPITAL ENCOUNTER (OUTPATIENT)
Age: 65
Discharge: HOME | End: 2025-03-18
Payer: COMMERCIAL

## 2025-03-18 DIAGNOSIS — R29.890 LOSS OF HEIGHT: ICD-10-CM

## 2025-03-18 PROCEDURE — 72070 X-RAY EXAM THORAC SPINE 2VWS: CPT

## 2025-03-18 PROCEDURE — 72100 X-RAY EXAM L-S SPINE 2/3 VWS: CPT

## 2025-03-21 ENCOUNTER — RESULTS FOLLOW-UP (OUTPATIENT)
Dept: GENERAL RADIOLOGY | Age: 65
End: 2025-03-21

## (undated) DEVICE — APPLIER LIG CLP M L11IN TI STR RNG HNDL FOR 20 CLP DISP

## (undated) DEVICE — NEEDLE SUT PASS FOR ROT CUF LABRAL REP SUREFIRE SCORPION

## (undated) DEVICE — 3M™ COBAN™ NL STERILE NON-LATEX SELF-ADHERENT WRAP, 2084S, 4 IN X 5 YD (10 CM X 4,5 M), 18 ROLLS/CASE: Brand: 3M™ COBAN™

## (undated) DEVICE — HYPODERMIC SAFETY NEEDLE: Brand: MAGELLAN

## (undated) DEVICE — SUTURE MCRYL + SZ 4-0 L18IN ABSRB UD L19MM PS-2 3/8 CIR MCP496G

## (undated) DEVICE — SOLUTION IV IRRIG POUR BRL 0.9% SODIUM CHL 2F7124

## (undated) DEVICE — INTENT TO BE USED WITH SUTURE MATERIAL FOR TISSUE CLOSURE: Brand: RICHARD-ALLAN® NEEDLE 1/2 CIRCLE TAPER

## (undated) DEVICE — STRIP,CLOSURE,WOUND,MEDI-STRIP,1/2X4: Brand: MEDLINE

## (undated) DEVICE — SUTURE PDS + SZ 0 L27IN ABSRB VLT L36MM CT 1 1 2 CIR PDP340H

## (undated) DEVICE — SUTURE VCRL + SZ 2-0 L18IN ABSRB UD CT1 L36MM 1/2 CIR VCP839D

## (undated) DEVICE — DRAPE,U/SHT,SPLIT,FILM,60X84,STERILE: Brand: MEDLINE

## (undated) DEVICE — SPONGE GZ W4XL4IN COT 12 PLY TYP VII WVN C FLD DSGN

## (undated) DEVICE — SPONGE LAP W18XL18IN WHT COT 4 PLY FLD STRUNG RADPQ DISP ST

## (undated) DEVICE — SUTURE PERMAHAND SZ 2-0 L12X18IN NONABSORBABLE BLK SILK A185H

## (undated) DEVICE — SUTURE VCRL + SZ 0 L18IN ABSRB UD L36MM CT-1 1/2 CIR VCP840D

## (undated) DEVICE — APPLIER CLP L9.375IN APER 2.1MM CLS L3.8MM 20 SM TI CLP

## (undated) DEVICE — CANNULA ARTHSCP L7CM DIA7MM TRNSLUC THRD FLX W/ NO SQUIRT

## (undated) DEVICE — ALCOHOL  RUBBING 70PERC ISOPROPYL  16-OZ

## (undated) DEVICE — SYRINGE MED 10ML LUERLOCK TIP W/O SFTY DISP

## (undated) DEVICE — GOWN SIRUS NONREIN XL W/TWL: Brand: MEDLINE INDUSTRIES, INC.

## (undated) DEVICE — MAJOR SET UP: Brand: MEDLINE INDUSTRIES, INC.

## (undated) DEVICE — SOLUTION IRRIG 3000ML LAC RINGERS ARTHROMTC PLAS CONT

## (undated) DEVICE — NEEDLE HYPO 21GA L1.5IN GRN POLYPR HUB S STL THN WALL IV

## (undated) DEVICE — STOCKINETTE IMPERV M 9X44IN POLY INNR LAYR COT ORTH EXT

## (undated) DEVICE — STOCKINETTE,IMPERVIOUS,12X48,STERILE: Brand: MEDLINE

## (undated) DEVICE — SHOULDER ARTHROSCOPY: Brand: MEDLINE INDUSTRIES, INC.

## (undated) DEVICE — BANDAGE COMPR W4INXL5YD BGE HI E W/ REM CLP SURE-WRAP

## (undated) DEVICE — 3M™ COBAN™ NL STERILE NON-LATEX SELF-ADHERENT WRAP, 2086S, 6 IN X 5 YD (15 CM X 4,5 M), 12 ROLLS/CASE: Brand: 3M™ COBAN™

## (undated) DEVICE — ELECTRODE PT RET AD L9FT HI MOIST COND ADH HYDRGEL CORDED

## (undated) DEVICE — ELECTRODE ELECSURG NDL 2.8 INX7.2 CM COAT INSUL EDGE

## (undated) DEVICE — TUBING PMP L16FT MAIN DISP FOR AR-6400 AR-6475

## (undated) DEVICE — INTENDED FOR TISSUE SEPARATION, AND OTHER PROCEDURES THAT REQUIRE A SHARP SURGICAL BLADE TO PUNCTURE OR CUT.: Brand: BARD-PARKER ® STAINLESS STEEL BLADES

## (undated) DEVICE — STERILE POLYISOPRENE POWDER-FREE SURGICAL GLOVES: Brand: PROTEXIS

## (undated) DEVICE — BANDAGE ELASTIC 5YDX4IN ST COMPRSS LF NON ADH

## (undated) DEVICE — 4-PORT MANIFOLD: Brand: NEPTUNE 2

## (undated) DEVICE — SUTURE NONABSORBABLE MONOFILAMENT 4-0 FS-2 18 IN ETHILON 662H

## (undated) DEVICE — SUTURE VCRL SZ 0 L18IN ABSRB UD L36MM CT-1 1/2 CIR J840D

## (undated) DEVICE — SHEET,DRAPE,53X77,STERILE: Brand: MEDLINE

## (undated) DEVICE — PADDING UNDERCAST W4INXL4YD 100% COT CRIMPED FINISH WBRL II

## (undated) DEVICE — DRAPE HND W114XL142IN BLU POLYPR W O PCH FEN CRD AND TB HLDR

## (undated) DEVICE — CANNULA ARTHSCP L7CM DIA6MM CONIC TIP THRD NONSHIELDED DISP

## (undated) DEVICE — BANDAGE COMPR W4INXL12FT E DISP ESMARCH EVEN

## (undated) DEVICE — STERILE LATEX POWDER-FREE SURGICAL GLOVESWITH NITRILE COATING: Brand: PROTEXIS

## (undated) DEVICE — 3M™ STERI-DRAPE™ U-DRAPE 1015: Brand: STERI-DRAPE™

## (undated) DEVICE — GOWN,REINF,POLY,AURORA,XLNG/XL,STRL: Brand: MEDLINE